# Patient Record
Sex: MALE | Race: WHITE | NOT HISPANIC OR LATINO | Employment: OTHER | URBAN - METROPOLITAN AREA
[De-identification: names, ages, dates, MRNs, and addresses within clinical notes are randomized per-mention and may not be internally consistent; named-entity substitution may affect disease eponyms.]

---

## 2019-05-01 ENCOUNTER — RECORDS - HEALTHEAST (OUTPATIENT)
Dept: LAB | Facility: CLINIC | Age: 84
End: 2019-05-01

## 2019-05-01 LAB — PREALB SERPL-MCNC: 15.6 MG/DL (ref 19–38)

## 2022-01-01 ENCOUNTER — APPOINTMENT (OUTPATIENT)
Dept: PHYSICAL THERAPY | Facility: HOSPITAL | Age: 87
DRG: 521 | End: 2022-01-01
Payer: COMMERCIAL

## 2022-01-01 ENCOUNTER — APPOINTMENT (OUTPATIENT)
Dept: OCCUPATIONAL THERAPY | Facility: HOSPITAL | Age: 87
DRG: 521 | End: 2022-01-01
Payer: COMMERCIAL

## 2022-01-01 ENCOUNTER — ANESTHESIA (OUTPATIENT)
Dept: SURGERY | Facility: HOSPITAL | Age: 87
DRG: 521 | End: 2022-01-01
Payer: COMMERCIAL

## 2022-01-01 ENCOUNTER — APPOINTMENT (OUTPATIENT)
Dept: RADIOLOGY | Facility: HOSPITAL | Age: 87
DRG: 521 | End: 2022-01-01
Attending: INTERNAL MEDICINE
Payer: COMMERCIAL

## 2022-01-01 ENCOUNTER — APPOINTMENT (OUTPATIENT)
Dept: RADIOLOGY | Facility: HOSPITAL | Age: 87
DRG: 521 | End: 2022-01-01
Attending: STUDENT IN AN ORGANIZED HEALTH CARE EDUCATION/TRAINING PROGRAM
Payer: COMMERCIAL

## 2022-01-01 ENCOUNTER — APPOINTMENT (OUTPATIENT)
Dept: OCCUPATIONAL THERAPY | Facility: HOSPITAL | Age: 87
DRG: 521 | End: 2022-01-01
Attending: STUDENT IN AN ORGANIZED HEALTH CARE EDUCATION/TRAINING PROGRAM
Payer: COMMERCIAL

## 2022-01-01 ENCOUNTER — APPOINTMENT (OUTPATIENT)
Dept: PHYSICAL THERAPY | Facility: HOSPITAL | Age: 87
DRG: 521 | End: 2022-01-01
Attending: STUDENT IN AN ORGANIZED HEALTH CARE EDUCATION/TRAINING PROGRAM
Payer: COMMERCIAL

## 2022-01-01 ENCOUNTER — APPOINTMENT (OUTPATIENT)
Dept: SPEECH THERAPY | Facility: HOSPITAL | Age: 87
DRG: 521 | End: 2022-01-01
Payer: COMMERCIAL

## 2022-01-01 ENCOUNTER — HOSPITAL ENCOUNTER (INPATIENT)
Facility: HOSPICE | Age: 87
LOS: 2 days | End: 2022-07-28
Attending: INTERNAL MEDICINE | Admitting: INTERNAL MEDICINE

## 2022-01-01 ENCOUNTER — ANESTHESIA EVENT (OUTPATIENT)
Dept: SURGERY | Facility: HOSPITAL | Age: 87
DRG: 521 | End: 2022-01-01
Payer: COMMERCIAL

## 2022-01-01 ENCOUNTER — HOSPITAL ENCOUNTER (INPATIENT)
Facility: HOSPITAL | Age: 87
LOS: 11 days | Discharge: HOSPICE | DRG: 521 | End: 2022-07-26
Attending: EMERGENCY MEDICINE | Admitting: INTERNAL MEDICINE
Payer: COMMERCIAL

## 2022-01-01 ENCOUNTER — DOCUMENTATION ONLY (OUTPATIENT)
Dept: OTHER | Facility: CLINIC | Age: 87
End: 2022-01-01

## 2022-01-01 ENCOUNTER — APPOINTMENT (OUTPATIENT)
Dept: CT IMAGING | Facility: HOSPITAL | Age: 87
DRG: 521 | End: 2022-01-01
Attending: INTERNAL MEDICINE
Payer: COMMERCIAL

## 2022-01-01 ENCOUNTER — APPOINTMENT (OUTPATIENT)
Dept: MRI IMAGING | Facility: HOSPITAL | Age: 87
DRG: 521 | End: 2022-01-01
Attending: INTERNAL MEDICINE
Payer: COMMERCIAL

## 2022-01-01 ENCOUNTER — APPOINTMENT (OUTPATIENT)
Dept: SPEECH THERAPY | Facility: HOSPITAL | Age: 87
DRG: 521 | End: 2022-01-01
Attending: INTERNAL MEDICINE
Payer: COMMERCIAL

## 2022-01-01 VITALS
OXYGEN SATURATION: 95 % | DIASTOLIC BLOOD PRESSURE: 70 MMHG | RESPIRATION RATE: 22 BRPM | SYSTOLIC BLOOD PRESSURE: 98 MMHG | TEMPERATURE: 98 F

## 2022-01-01 VITALS
DIASTOLIC BLOOD PRESSURE: 68 MMHG | HEART RATE: 74 BPM | WEIGHT: 137 LBS | SYSTOLIC BLOOD PRESSURE: 137 MMHG | BODY MASS INDEX: 20.76 KG/M2 | TEMPERATURE: 97.6 F | OXYGEN SATURATION: 94 % | HEIGHT: 68 IN | RESPIRATION RATE: 18 BRPM

## 2022-01-01 DIAGNOSIS — S72.002A HIP FRACTURE, LEFT, CLOSED, INITIAL ENCOUNTER (H): ICD-10-CM

## 2022-01-01 DIAGNOSIS — G20.A1 PARKINSON DISEASE (H): Primary | ICD-10-CM

## 2022-01-01 LAB
ABO/RH(D): NORMAL
ALBUMIN SERPL-MCNC: 3.8 G/DL (ref 3.5–5)
ALP SERPL-CCNC: 96 U/L (ref 45–120)
ALT SERPL W P-5'-P-CCNC: <9 U/L (ref 0–45)
ANION GAP SERPL CALCULATED.3IONS-SCNC: 11 MMOL/L (ref 5–18)
ANION GAP SERPL CALCULATED.3IONS-SCNC: 5 MMOL/L (ref 5–18)
ANION GAP SERPL CALCULATED.3IONS-SCNC: 6 MMOL/L (ref 5–18)
ANION GAP SERPL CALCULATED.3IONS-SCNC: 6 MMOL/L (ref 5–18)
ANION GAP SERPL CALCULATED.3IONS-SCNC: 7 MMOL/L (ref 5–18)
ANION GAP SERPL CALCULATED.3IONS-SCNC: 7 MMOL/L (ref 5–18)
ANION GAP SERPL CALCULATED.3IONS-SCNC: 8 MMOL/L (ref 5–18)
ANTIBODY SCREEN: NEGATIVE
AST SERPL W P-5'-P-CCNC: 28 U/L (ref 0–40)
ATRIAL RATE - MUSE: 59 BPM
BASOPHILS # BLD AUTO: 0 10E3/UL (ref 0–0.2)
BASOPHILS # BLD AUTO: 0 10E3/UL (ref 0–0.2)
BASOPHILS NFR BLD AUTO: 0 %
BASOPHILS NFR BLD AUTO: 0 %
BILIRUB SERPL-MCNC: 1.5 MG/DL (ref 0–1)
BUN SERPL-MCNC: 15 MG/DL (ref 8–28)
BUN SERPL-MCNC: 15 MG/DL (ref 8–28)
BUN SERPL-MCNC: 17 MG/DL (ref 8–28)
BUN SERPL-MCNC: 17 MG/DL (ref 8–28)
BUN SERPL-MCNC: 23 MG/DL (ref 8–28)
BUN SERPL-MCNC: 26 MG/DL (ref 8–28)
BUN SERPL-MCNC: 33 MG/DL (ref 8–28)
CALCIUM SERPL-MCNC: 8.3 MG/DL (ref 8.5–10.5)
CALCIUM SERPL-MCNC: 8.3 MG/DL (ref 8.5–10.5)
CALCIUM SERPL-MCNC: 8.4 MG/DL (ref 8.5–10.5)
CALCIUM SERPL-MCNC: 8.5 MG/DL (ref 8.5–10.5)
CALCIUM SERPL-MCNC: 8.6 MG/DL (ref 8.5–10.5)
CALCIUM SERPL-MCNC: 8.6 MG/DL (ref 8.5–10.5)
CALCIUM SERPL-MCNC: 9.4 MG/DL (ref 8.5–10.5)
CHLORIDE BLD-SCNC: 105 MMOL/L (ref 98–107)
CHLORIDE BLD-SCNC: 107 MMOL/L (ref 98–107)
CHLORIDE BLD-SCNC: 107 MMOL/L (ref 98–107)
CHLORIDE BLD-SCNC: 108 MMOL/L (ref 98–107)
CHLORIDE BLD-SCNC: 109 MMOL/L (ref 98–107)
CO2 SERPL-SCNC: 25 MMOL/L (ref 22–31)
CO2 SERPL-SCNC: 27 MMOL/L (ref 22–31)
CO2 SERPL-SCNC: 27 MMOL/L (ref 22–31)
CO2 SERPL-SCNC: 28 MMOL/L (ref 22–31)
CO2 SERPL-SCNC: 28 MMOL/L (ref 22–31)
CO2 SERPL-SCNC: 29 MMOL/L (ref 22–31)
CO2 SERPL-SCNC: 30 MMOL/L (ref 22–31)
CREAT SERPL-MCNC: 0.71 MG/DL (ref 0.7–1.3)
CREAT SERPL-MCNC: 0.74 MG/DL (ref 0.7–1.3)
CREAT SERPL-MCNC: 0.76 MG/DL (ref 0.7–1.3)
CREAT SERPL-MCNC: 0.77 MG/DL (ref 0.7–1.3)
CREAT SERPL-MCNC: 0.82 MG/DL (ref 0.7–1.3)
CREAT SERPL-MCNC: 0.98 MG/DL (ref 0.7–1.3)
CREAT SERPL-MCNC: 1.26 MG/DL (ref 0.7–1.3)
DIASTOLIC BLOOD PRESSURE - MUSE: 78 MMHG
EOSINOPHIL # BLD AUTO: 0 10E3/UL (ref 0–0.7)
EOSINOPHIL # BLD AUTO: 0.1 10E3/UL (ref 0–0.7)
EOSINOPHIL NFR BLD AUTO: 0 %
EOSINOPHIL NFR BLD AUTO: 1 %
ERYTHROCYTE [DISTWIDTH] IN BLOOD BY AUTOMATED COUNT: 12.8 % (ref 10–15)
ERYTHROCYTE [DISTWIDTH] IN BLOOD BY AUTOMATED COUNT: 12.8 % (ref 10–15)
ERYTHROCYTE [DISTWIDTH] IN BLOOD BY AUTOMATED COUNT: 13.2 % (ref 10–15)
GFR SERPL CREATININE-BSD FRML MDRD: 55 ML/MIN/1.73M2
GFR SERPL CREATININE-BSD FRML MDRD: 74 ML/MIN/1.73M2
GFR SERPL CREATININE-BSD FRML MDRD: 84 ML/MIN/1.73M2
GFR SERPL CREATININE-BSD FRML MDRD: 86 ML/MIN/1.73M2
GFR SERPL CREATININE-BSD FRML MDRD: 86 ML/MIN/1.73M2
GFR SERPL CREATININE-BSD FRML MDRD: 87 ML/MIN/1.73M2
GFR SERPL CREATININE-BSD FRML MDRD: 88 ML/MIN/1.73M2
GLUCOSE BLD-MCNC: 102 MG/DL (ref 70–125)
GLUCOSE BLD-MCNC: 103 MG/DL (ref 70–125)
GLUCOSE BLD-MCNC: 104 MG/DL (ref 70–125)
GLUCOSE BLD-MCNC: 107 MG/DL (ref 70–125)
GLUCOSE BLD-MCNC: 110 MG/DL (ref 70–125)
GLUCOSE BLD-MCNC: 97 MG/DL (ref 70–125)
GLUCOSE BLD-MCNC: 99 MG/DL (ref 70–125)
GLUCOSE BLDC GLUCOMTR-MCNC: 115 MG/DL (ref 70–99)
GLUCOSE BLDC GLUCOMTR-MCNC: 164 MG/DL (ref 70–99)
HCT VFR BLD AUTO: 34.8 % (ref 40–53)
HCT VFR BLD AUTO: 40.1 % (ref 40–53)
HCT VFR BLD AUTO: 43.8 % (ref 40–53)
HGB BLD-MCNC: 10.5 G/DL (ref 13.3–17.7)
HGB BLD-MCNC: 11.1 G/DL (ref 13.3–17.7)
HGB BLD-MCNC: 11.5 G/DL (ref 13.3–17.7)
HGB BLD-MCNC: 11.5 G/DL (ref 13.3–17.7)
HGB BLD-MCNC: 11.7 G/DL (ref 13.3–17.7)
HGB BLD-MCNC: 12 G/DL (ref 13.3–17.7)
HGB BLD-MCNC: 13.3 G/DL (ref 13.3–17.7)
HGB BLD-MCNC: 14.5 G/DL (ref 13.3–17.7)
HOLD SPECIMEN: NORMAL
IMM GRANULOCYTES # BLD: 0 10E3/UL
IMM GRANULOCYTES # BLD: 0.1 10E3/UL
IMM GRANULOCYTES NFR BLD: 1 %
IMM GRANULOCYTES NFR BLD: 1 %
INTERPRETATION ECG - MUSE: NORMAL
LYMPHOCYTES # BLD AUTO: 0.8 10E3/UL (ref 0.8–5.3)
LYMPHOCYTES # BLD AUTO: 0.9 10E3/UL (ref 0.8–5.3)
LYMPHOCYTES NFR BLD AUTO: 6 %
LYMPHOCYTES NFR BLD AUTO: 9 %
MAGNESIUM SERPL-MCNC: 1.9 MG/DL (ref 1.8–2.6)
MAGNESIUM SERPL-MCNC: 1.9 MG/DL (ref 1.8–2.6)
MAGNESIUM SERPL-MCNC: 2 MG/DL (ref 1.8–2.6)
MAGNESIUM SERPL-MCNC: 2 MG/DL (ref 1.8–2.6)
MAGNESIUM SERPL-MCNC: 2.2 MG/DL (ref 1.8–2.6)
MCH RBC QN AUTO: 30.7 PG (ref 26.5–33)
MCH RBC QN AUTO: 30.9 PG (ref 26.5–33)
MCH RBC QN AUTO: 31.2 PG (ref 26.5–33)
MCHC RBC AUTO-ENTMCNC: 33 G/DL (ref 31.5–36.5)
MCHC RBC AUTO-ENTMCNC: 33.1 G/DL (ref 31.5–36.5)
MCHC RBC AUTO-ENTMCNC: 33.2 G/DL (ref 31.5–36.5)
MCV RBC AUTO: 93 FL (ref 78–100)
MCV RBC AUTO: 93 FL (ref 78–100)
MCV RBC AUTO: 94 FL (ref 78–100)
MONOCYTES # BLD AUTO: 0.6 10E3/UL (ref 0–1.3)
MONOCYTES # BLD AUTO: 0.8 10E3/UL (ref 0–1.3)
MONOCYTES NFR BLD AUTO: 6 %
MONOCYTES NFR BLD AUTO: 7 %
NEUTROPHILS # BLD AUTO: 13.1 10E3/UL (ref 1.6–8.3)
NEUTROPHILS # BLD AUTO: 6.9 10E3/UL (ref 1.6–8.3)
NEUTROPHILS NFR BLD AUTO: 82 %
NEUTROPHILS NFR BLD AUTO: 87 %
NRBC # BLD AUTO: 0 10E3/UL
NRBC # BLD AUTO: 0 10E3/UL
NRBC BLD AUTO-RTO: 0 /100
NRBC BLD AUTO-RTO: 0 /100
P AXIS - MUSE: 56 DEGREES
PLATELET # BLD AUTO: 154 10E3/UL (ref 150–450)
PLATELET # BLD AUTO: 171 10E3/UL (ref 150–450)
PLATELET # BLD AUTO: 193 10E3/UL (ref 150–450)
POTASSIUM BLD-SCNC: 3.3 MMOL/L (ref 3.5–5)
POTASSIUM BLD-SCNC: 3.5 MMOL/L (ref 3.5–5)
POTASSIUM BLD-SCNC: 3.6 MMOL/L (ref 3.5–5)
POTASSIUM BLD-SCNC: 3.7 MMOL/L (ref 3.5–5)
POTASSIUM BLD-SCNC: 3.8 MMOL/L (ref 3.5–5)
POTASSIUM BLD-SCNC: 3.9 MMOL/L (ref 3.5–5)
POTASSIUM BLD-SCNC: 4 MMOL/L (ref 3.5–5)
POTASSIUM BLD-SCNC: 4.1 MMOL/L (ref 3.5–5)
POTASSIUM BLD-SCNC: 4.2 MMOL/L (ref 3.5–5)
PR INTERVAL - MUSE: 164 MS
PROT SERPL-MCNC: 7.1 G/DL (ref 6–8)
QRS DURATION - MUSE: 150 MS
QT - MUSE: 476 MS
QTC - MUSE: 471 MS
R AXIS - MUSE: -39 DEGREES
RBC # BLD AUTO: 3.74 10E6/UL (ref 4.4–5.9)
RBC # BLD AUTO: 4.3 10E6/UL (ref 4.4–5.9)
RBC # BLD AUTO: 4.65 10E6/UL (ref 4.4–5.9)
SARS-COV-2 RNA RESP QL NAA+PROBE: NEGATIVE
SARS-COV-2 RNA RESP QL NAA+PROBE: NEGATIVE
SODIUM SERPL-SCNC: 139 MMOL/L (ref 136–145)
SODIUM SERPL-SCNC: 141 MMOL/L (ref 136–145)
SODIUM SERPL-SCNC: 142 MMOL/L (ref 136–145)
SODIUM SERPL-SCNC: 143 MMOL/L (ref 136–145)
SODIUM SERPL-SCNC: 143 MMOL/L (ref 136–145)
SPECIMEN EXPIRATION DATE: NORMAL
SYSTOLIC BLOOD PRESSURE - MUSE: 149 MMHG
T AXIS - MUSE: 35 DEGREES
VENTRICULAR RATE- MUSE: 59 BPM
WBC # BLD AUTO: 14.8 10E3/UL (ref 4–11)
WBC # BLD AUTO: 7.7 10E3/UL (ref 4–11)
WBC # BLD AUTO: 8.4 10E3/UL (ref 4–11)

## 2022-01-01 PROCEDURE — 250N000013 HC RX MED GY IP 250 OP 250 PS 637: Performed by: STUDENT IN AN ORGANIZED HEALTH CARE EDUCATION/TRAINING PROGRAM

## 2022-01-01 PROCEDURE — 250N000013 HC RX MED GY IP 250 OP 250 PS 637: Performed by: PHYSICIAN ASSISTANT

## 2022-01-01 PROCEDURE — 258N000003 HC RX IP 258 OP 636: Performed by: ANESTHESIOLOGY

## 2022-01-01 PROCEDURE — 250N000013 HC RX MED GY IP 250 OP 250 PS 637: Performed by: FAMILY MEDICINE

## 2022-01-01 PROCEDURE — C1713 ANCHOR/SCREW BN/BN,TIS/BN: HCPCS | Performed by: STUDENT IN AN ORGANIZED HEALTH CARE EDUCATION/TRAINING PROGRAM

## 2022-01-01 PROCEDURE — 70551 MRI BRAIN STEM W/O DYE: CPT

## 2022-01-01 PROCEDURE — 258N000003 HC RX IP 258 OP 636: Performed by: INTERNAL MEDICINE

## 2022-01-01 PROCEDURE — 250N000011 HC RX IP 250 OP 636: Performed by: HOSPITALIST

## 2022-01-01 PROCEDURE — 120N000001 HC R&B MED SURG/OB

## 2022-01-01 PROCEDURE — 97110 THERAPEUTIC EXERCISES: CPT | Mod: GP

## 2022-01-01 PROCEDURE — 250N000009 HC RX 250: Performed by: ANESTHESIOLOGY

## 2022-01-01 PROCEDURE — 99223 1ST HOSP IP/OBS HIGH 75: CPT | Performed by: INTERNAL MEDICINE

## 2022-01-01 PROCEDURE — 97116 GAIT TRAINING THERAPY: CPT | Mod: GP

## 2022-01-01 PROCEDURE — 80048 BASIC METABOLIC PNL TOTAL CA: CPT | Performed by: INTERNAL MEDICINE

## 2022-01-01 PROCEDURE — 36415 COLL VENOUS BLD VENIPUNCTURE: CPT | Performed by: INTERNAL MEDICINE

## 2022-01-01 PROCEDURE — 74230 X-RAY XM SWLNG FUNCJ C+: CPT

## 2022-01-01 PROCEDURE — 83735 ASSAY OF MAGNESIUM: CPT | Performed by: INTERNAL MEDICINE

## 2022-01-01 PROCEDURE — 99233 SBSQ HOSP IP/OBS HIGH 50: CPT | Performed by: INTERNAL MEDICINE

## 2022-01-01 PROCEDURE — 97530 THERAPEUTIC ACTIVITIES: CPT | Mod: GP

## 2022-01-01 PROCEDURE — 99255 IP/OBS CONSLTJ NEW/EST HI 80: CPT | Performed by: FAMILY MEDICINE

## 2022-01-01 PROCEDURE — 85018 HEMOGLOBIN: CPT | Performed by: INTERNAL MEDICINE

## 2022-01-01 PROCEDURE — 99232 SBSQ HOSP IP/OBS MODERATE 35: CPT | Performed by: FAMILY MEDICINE

## 2022-01-01 PROCEDURE — 999N000141 HC STATISTIC PRE-PROCEDURE NURSING ASSESSMENT: Performed by: STUDENT IN AN ORGANIZED HEALTH CARE EDUCATION/TRAINING PROGRAM

## 2022-01-01 PROCEDURE — 250N000013 HC RX MED GY IP 250 OP 250 PS 637: Performed by: INTERNAL MEDICINE

## 2022-01-01 PROCEDURE — 99239 HOSP IP/OBS DSCHRG MGMT >30: CPT | Performed by: FAMILY MEDICINE

## 2022-01-01 PROCEDURE — 36415 COLL VENOUS BLD VENIPUNCTURE: CPT | Performed by: STUDENT IN AN ORGANIZED HEALTH CARE EDUCATION/TRAINING PROGRAM

## 2022-01-01 PROCEDURE — 97535 SELF CARE MNGMENT TRAINING: CPT | Mod: GO

## 2022-01-01 PROCEDURE — 99231 SBSQ HOSP IP/OBS SF/LOW 25: CPT | Performed by: FAMILY MEDICINE

## 2022-01-01 PROCEDURE — 370N000017 HC ANESTHESIA TECHNICAL FEE, PER MIN: Performed by: STUDENT IN AN ORGANIZED HEALTH CARE EDUCATION/TRAINING PROGRAM

## 2022-01-01 PROCEDURE — 0SRS0J9 REPLACEMENT OF LEFT HIP JOINT, FEMORAL SURFACE WITH SYNTHETIC SUBSTITUTE, CEMENTED, OPEN APPROACH: ICD-10-PCS | Performed by: STUDENT IN AN ORGANIZED HEALTH CARE EDUCATION/TRAINING PROGRAM

## 2022-01-01 PROCEDURE — T2046 HOSPICE LONG TERM CARE, R&B: HCPCS

## 2022-01-01 PROCEDURE — 85025 COMPLETE CBC W/AUTO DIFF WBC: CPT | Performed by: INTERNAL MEDICINE

## 2022-01-01 PROCEDURE — 82374 ASSAY BLOOD CARBON DIOXIDE: CPT | Performed by: INTERNAL MEDICINE

## 2022-01-01 PROCEDURE — 250N000013 HC RX MED GY IP 250 OP 250 PS 637: Performed by: NURSE PRACTITIONER

## 2022-01-01 PROCEDURE — 97165 OT EVAL LOW COMPLEX 30 MIN: CPT | Mod: GO

## 2022-01-01 PROCEDURE — 87635 SARS-COV-2 COVID-19 AMP PRB: CPT | Performed by: FAMILY MEDICINE

## 2022-01-01 PROCEDURE — 99233 SBSQ HOSP IP/OBS HIGH 50: CPT | Performed by: FAMILY MEDICINE

## 2022-01-01 PROCEDURE — 85018 HEMOGLOBIN: CPT | Performed by: STUDENT IN AN ORGANIZED HEALTH CARE EDUCATION/TRAINING PROGRAM

## 2022-01-01 PROCEDURE — 93005 ELECTROCARDIOGRAM TRACING: CPT | Mod: 76

## 2022-01-01 PROCEDURE — 710N000009 HC RECOVERY PHASE 1, LEVEL 1, PER MIN: Performed by: STUDENT IN AN ORGANIZED HEALTH CARE EDUCATION/TRAINING PROGRAM

## 2022-01-01 PROCEDURE — C1776 JOINT DEVICE (IMPLANTABLE): HCPCS | Performed by: STUDENT IN AN ORGANIZED HEALTH CARE EDUCATION/TRAINING PROGRAM

## 2022-01-01 PROCEDURE — 92611 MOTION FLUOROSCOPY/SWALLOW: CPT | Mod: GN

## 2022-01-01 PROCEDURE — 250N000011 HC RX IP 250 OP 636: Performed by: STUDENT IN AN ORGANIZED HEALTH CARE EDUCATION/TRAINING PROGRAM

## 2022-01-01 PROCEDURE — 360N000077 HC SURGERY LEVEL 4, PER MIN: Performed by: STUDENT IN AN ORGANIZED HEALTH CARE EDUCATION/TRAINING PROGRAM

## 2022-01-01 PROCEDURE — 258N000001 HC RX 258: Performed by: STUDENT IN AN ORGANIZED HEALTH CARE EDUCATION/TRAINING PROGRAM

## 2022-01-01 PROCEDURE — 72170 X-RAY EXAM OF PELVIS: CPT

## 2022-01-01 PROCEDURE — 99232 SBSQ HOSP IP/OBS MODERATE 35: CPT | Performed by: INTERNAL MEDICINE

## 2022-01-01 PROCEDURE — 272N000001 HC OR GENERAL SUPPLY STERILE: Performed by: STUDENT IN AN ORGANIZED HEALTH CARE EDUCATION/TRAINING PROGRAM

## 2022-01-01 PROCEDURE — 92526 ORAL FUNCTION THERAPY: CPT | Mod: GN | Performed by: SPEECH-LANGUAGE PATHOLOGIST

## 2022-01-01 PROCEDURE — 250N000011 HC RX IP 250 OP 636: Performed by: INTERNAL MEDICINE

## 2022-01-01 PROCEDURE — 87635 SARS-COV-2 COVID-19 AMP PRB: CPT | Performed by: EMERGENCY MEDICINE

## 2022-01-01 PROCEDURE — 250N000011 HC RX IP 250 OP 636: Performed by: NURSE ANESTHETIST, CERTIFIED REGISTERED

## 2022-01-01 PROCEDURE — 85027 COMPLETE CBC AUTOMATED: CPT | Performed by: INTERNAL MEDICINE

## 2022-01-01 PROCEDURE — 70450 CT HEAD/BRAIN W/O DYE: CPT

## 2022-01-01 PROCEDURE — C9803 HOPD COVID-19 SPEC COLLECT: HCPCS

## 2022-01-01 PROCEDURE — 99285 EMERGENCY DEPT VISIT HI MDM: CPT | Mod: 25

## 2022-01-01 PROCEDURE — 999N000065 XR PELVIS AND HIP PORTABLE LEFT 1 VIEW

## 2022-01-01 PROCEDURE — 86850 RBC ANTIBODY SCREEN: CPT | Performed by: HOSPITALIST

## 2022-01-01 PROCEDURE — 99222 1ST HOSP IP/OBS MODERATE 55: CPT | Performed by: NURSE PRACTITIONER

## 2022-01-01 PROCEDURE — 85004 AUTOMATED DIFF WBC COUNT: CPT | Performed by: EMERGENCY MEDICINE

## 2022-01-01 PROCEDURE — 250N000009 HC RX 250: Performed by: STUDENT IN AN ORGANIZED HEALTH CARE EDUCATION/TRAINING PROGRAM

## 2022-01-01 PROCEDURE — 250N000009 HC RX 250: Performed by: NURSE ANESTHETIST, CERTIFIED REGISTERED

## 2022-01-01 PROCEDURE — 250N000013 HC RX MED GY IP 250 OP 250 PS 637

## 2022-01-01 PROCEDURE — 93005 ELECTROCARDIOGRAM TRACING: CPT | Performed by: INTERNAL MEDICINE

## 2022-01-01 PROCEDURE — 999N000127 HC STATISTIC PERIPHERAL IV START W US GUIDANCE

## 2022-01-01 PROCEDURE — 97162 PT EVAL MOD COMPLEX 30 MIN: CPT | Mod: GP

## 2022-01-01 PROCEDURE — 80053 COMPREHEN METABOLIC PANEL: CPT | Performed by: EMERGENCY MEDICINE

## 2022-01-01 PROCEDURE — 84132 ASSAY OF SERUM POTASSIUM: CPT | Performed by: INTERNAL MEDICINE

## 2022-01-01 PROCEDURE — 92610 EVALUATE SWALLOWING FUNCTION: CPT | Mod: GN | Performed by: SPEECH-LANGUAGE PATHOLOGIST

## 2022-01-01 PROCEDURE — 36415 COLL VENOUS BLD VENIPUNCTURE: CPT | Performed by: EMERGENCY MEDICINE

## 2022-01-01 PROCEDURE — 96374 THER/PROPH/DIAG INJ IV PUSH: CPT

## 2022-01-01 PROCEDURE — 258N000003 HC RX IP 258 OP 636: Performed by: NURSE ANESTHETIST, CERTIFIED REGISTERED

## 2022-01-01 PROCEDURE — 73552 X-RAY EXAM OF FEMUR 2/>: CPT | Mod: LT

## 2022-01-01 DEVICE — PLUG CEMENT BUCK W/INSERT 18.5 71279422: Type: IMPLANTABLE DEVICE | Site: FEMUR | Status: FUNCTIONAL

## 2022-01-01 DEVICE — TOBRA FULL DOSE ANTIBIOTIC BONE CEMENT, 10 PACK CATALOG NUMBER IS 6197-9-010
Type: IMPLANTABLE DEVICE | Site: FEMUR | Status: FUNCTIONAL
Brand: SIMPLEX

## 2022-01-01 DEVICE — MODULAR CATHCART TAPERED SPACER 12/14 TAPER -3MM: Type: IMPLANTABLE DEVICE | Site: FEMUR | Status: FUNCTIONAL

## 2022-01-01 DEVICE — MODULAR CATHCART FRACTURE HEAD HIP BALL 49MM OD +5MM: Type: IMPLANTABLE DEVICE | Site: FEMUR | Status: FUNCTIONAL

## 2022-01-01 DEVICE — IMPLANTABLE DEVICE: Type: IMPLANTABLE DEVICE | Site: FEMUR | Status: FUNCTIONAL

## 2022-01-01 DEVICE — SUMMIT FEMORAL STEM 12/14 TAPER BASIC CEMENTED SIZE 5 121MM
Type: IMPLANTABLE DEVICE | Site: FEMUR | Status: FUNCTIONAL
Brand: SUMMIT

## 2022-01-01 RX ORDER — HEPARIN SODIUM 5000 [USP'U]/.5ML
5000 INJECTION, SOLUTION INTRAVENOUS; SUBCUTANEOUS EVERY 8 HOURS
Status: DISCONTINUED | OUTPATIENT
Start: 2022-01-01 | End: 2022-01-01

## 2022-01-01 RX ORDER — CARBIDOPA AND LEVODOPA 25; 100 MG/1; MG/1
1 TABLET ORAL
Status: DISCONTINUED | OUTPATIENT
Start: 2022-01-01 | End: 2022-01-01 | Stop reason: HOSPADM

## 2022-01-01 RX ORDER — PROCHLORPERAZINE MALEATE 5 MG
5 TABLET ORAL EVERY 6 HOURS PRN
Status: DISCONTINUED | OUTPATIENT
Start: 2022-01-01 | End: 2022-01-01

## 2022-01-01 RX ORDER — NALOXONE HYDROCHLORIDE 0.4 MG/ML
0.2 INJECTION, SOLUTION INTRAMUSCULAR; INTRAVENOUS; SUBCUTANEOUS
Status: DISCONTINUED | OUTPATIENT
Start: 2022-01-01 | End: 2022-01-01

## 2022-01-01 RX ORDER — MINERAL OIL/HYDROPHIL PETROLAT
OINTMENT (GRAM) TOPICAL
Status: DISCONTINUED | OUTPATIENT
Start: 2022-01-01 | End: 2022-01-01 | Stop reason: HOSPADM

## 2022-01-01 RX ORDER — LORAZEPAM 2 MG/ML
0.5 CONCENTRATE ORAL EVERY 4 HOURS PRN
Status: DISCONTINUED | OUTPATIENT
Start: 2022-01-01 | End: 2022-01-01 | Stop reason: HOSPADM

## 2022-01-01 RX ORDER — LORAZEPAM 2 MG/ML
1-2 CONCENTRATE ORAL EVERY 4 HOURS PRN
Status: DISCONTINUED | OUTPATIENT
Start: 2022-01-01 | End: 2022-01-01

## 2022-01-01 RX ORDER — LIDOCAINE 50 MG/G
OINTMENT TOPICAL EVERY 4 HOURS PRN
Qty: 50 G | Refills: 0 | Status: SHIPPED | OUTPATIENT
Start: 2022-01-01

## 2022-01-01 RX ORDER — MORPHINE SULFATE 20 MG/ML
5-10 SOLUTION ORAL
Qty: 30 ML | Refills: 0 | Status: SHIPPED | OUTPATIENT
Start: 2022-01-01 | End: 2022-01-01

## 2022-01-01 RX ORDER — NALOXONE HYDROCHLORIDE 0.4 MG/ML
0.4 INJECTION, SOLUTION INTRAMUSCULAR; INTRAVENOUS; SUBCUTANEOUS
Status: DISCONTINUED | OUTPATIENT
Start: 2022-01-01 | End: 2022-01-01

## 2022-01-01 RX ORDER — LANOLIN ALCOHOL/MO/W.PET/CERES
3 CREAM (GRAM) TOPICAL AT BEDTIME
Status: DISCONTINUED | OUTPATIENT
Start: 2022-01-01 | End: 2022-01-01 | Stop reason: HOSPADM

## 2022-01-01 RX ORDER — SODIUM CHLORIDE, SODIUM LACTATE, POTASSIUM CHLORIDE, CALCIUM CHLORIDE 600; 310; 30; 20 MG/100ML; MG/100ML; MG/100ML; MG/100ML
INJECTION, SOLUTION INTRAVENOUS CONTINUOUS
Status: DISCONTINUED | OUTPATIENT
Start: 2022-01-01 | End: 2022-01-01

## 2022-01-01 RX ORDER — CARBIDOPA AND LEVODOPA 25; 100 MG/1; MG/1
1.5 TABLET ORAL 3 TIMES DAILY
Qty: 30 TABLET | Refills: 0 | Status: SHIPPED | OUTPATIENT
Start: 2022-01-01

## 2022-01-01 RX ORDER — ACETAMINOPHEN 325 MG/1
975 TABLET ORAL EVERY 8 HOURS
Status: DISCONTINUED | OUTPATIENT
Start: 2022-01-01 | End: 2022-01-01

## 2022-01-01 RX ORDER — CARBIDOPA AND LEVODOPA 25; 100 MG/1; MG/1
1 TABLET ORAL 3 TIMES DAILY
Status: DISCONTINUED | OUTPATIENT
Start: 2022-01-01 | End: 2022-01-01

## 2022-01-01 RX ORDER — ASPIRIN 325 MG
325 TABLET ORAL 2 TIMES DAILY
Qty: 60 TABLET | Refills: 0 | Status: SHIPPED | OUTPATIENT
Start: 2022-01-01 | End: 2022-01-01

## 2022-01-01 RX ORDER — ACETAMINOPHEN 325 MG/1
650 TABLET ORAL 3 TIMES DAILY
Status: DISCONTINUED | OUTPATIENT
Start: 2022-01-01 | End: 2022-01-01 | Stop reason: HOSPADM

## 2022-01-01 RX ORDER — ONDANSETRON 4 MG/1
4 TABLET, ORALLY DISINTEGRATING ORAL EVERY 6 HOURS PRN
Status: DISCONTINUED | OUTPATIENT
Start: 2022-01-01 | End: 2022-01-01 | Stop reason: HOSPADM

## 2022-01-01 RX ORDER — CARBIDOPA AND LEVODOPA 25; 100 MG/1; MG/1
1.5 TABLET ORAL 3 TIMES DAILY
Status: ON HOLD | COMMUNITY
End: 2022-01-01

## 2022-01-01 RX ORDER — MORPHINE SULFATE 2 MG/ML
1-2 INJECTION, SOLUTION INTRAMUSCULAR; INTRAVENOUS
Status: DISCONTINUED | OUTPATIENT
Start: 2022-01-01 | End: 2022-01-01 | Stop reason: HOSPADM

## 2022-01-01 RX ORDER — PROPOFOL 10 MG/ML
INJECTION, EMULSION INTRAVENOUS CONTINUOUS PRN
Status: DISCONTINUED | OUTPATIENT
Start: 2022-01-01 | End: 2022-01-01

## 2022-01-01 RX ORDER — FAMOTIDINE 20 MG/1
20 TABLET, FILM COATED ORAL 2 TIMES DAILY
Status: ON HOLD | COMMUNITY
End: 2022-01-01

## 2022-01-01 RX ORDER — MAGNESIUM HYDROXIDE 1200 MG/15ML
LIQUID ORAL PRN
Status: DISCONTINUED | OUTPATIENT
Start: 2022-01-01 | End: 2022-01-01 | Stop reason: HOSPADM

## 2022-01-01 RX ORDER — CEFAZOLIN SODIUM 1 G/3ML
1 INJECTION, POWDER, FOR SOLUTION INTRAMUSCULAR; INTRAVENOUS EVERY 8 HOURS
Status: COMPLETED | OUTPATIENT
Start: 2022-01-01 | End: 2022-01-01

## 2022-01-01 RX ORDER — CEFAZOLIN SODIUM/WATER 2 G/20 ML
2 SYRINGE (ML) INTRAVENOUS SEE ADMIN INSTRUCTIONS
Status: DISCONTINUED | OUTPATIENT
Start: 2022-01-01 | End: 2022-01-01

## 2022-01-01 RX ORDER — QUETIAPINE FUMARATE 25 MG/1
25 TABLET, FILM COATED ORAL AT BEDTIME
Status: DISCONTINUED | OUTPATIENT
Start: 2022-01-01 | End: 2022-01-01 | Stop reason: HOSPADM

## 2022-01-01 RX ORDER — QUETIAPINE FUMARATE 50 MG/1
50 TABLET, FILM COATED ORAL ONCE
Status: COMPLETED | OUTPATIENT
Start: 2022-01-01 | End: 2022-01-01

## 2022-01-01 RX ORDER — LATANOPROST 50 UG/ML
1 SOLUTION/ DROPS OPHTHALMIC AT BEDTIME
COMMUNITY

## 2022-01-01 RX ORDER — MORPHINE SULFATE 20 MG/ML
5-10 SOLUTION ORAL
Status: DISCONTINUED | OUTPATIENT
Start: 2022-01-01 | End: 2022-01-01 | Stop reason: HOSPADM

## 2022-01-01 RX ORDER — AMOXICILLIN 250 MG
1 CAPSULE ORAL 2 TIMES DAILY PRN
Qty: 60 TABLET | Refills: 0 | Status: SHIPPED | OUTPATIENT
Start: 2022-01-01

## 2022-01-01 RX ORDER — LIDOCAINE 40 MG/G
CREAM TOPICAL
Status: DISCONTINUED | OUTPATIENT
Start: 2022-01-01 | End: 2022-01-01

## 2022-01-01 RX ORDER — MORPHINE SULFATE 20 MG/ML
5 SOLUTION ORAL
Status: DISCONTINUED | OUTPATIENT
Start: 2022-01-01 | End: 2022-01-01 | Stop reason: HOSPADM

## 2022-01-01 RX ORDER — POTASSIUM CHLORIDE 7.45 MG/ML
10 INJECTION INTRAVENOUS
Status: COMPLETED | OUTPATIENT
Start: 2022-01-01 | End: 2022-01-01

## 2022-01-01 RX ORDER — BARIUM SULFATE 400 MG/ML
SUSPENSION ORAL ONCE
Status: COMPLETED | OUTPATIENT
Start: 2022-01-01 | End: 2022-01-01

## 2022-01-01 RX ORDER — CEFAZOLIN SODIUM/WATER 2 G/20 ML
2 SYRINGE (ML) INTRAVENOUS
Status: COMPLETED | OUTPATIENT
Start: 2022-01-01 | End: 2022-01-01

## 2022-01-01 RX ORDER — LIDOCAINE 50 MG/G
OINTMENT TOPICAL EVERY 4 HOURS PRN
Status: DISCONTINUED | OUTPATIENT
Start: 2022-01-01 | End: 2022-01-01 | Stop reason: HOSPADM

## 2022-01-01 RX ORDER — ONDANSETRON 2 MG/ML
4 INJECTION INTRAMUSCULAR; INTRAVENOUS EVERY 6 HOURS PRN
Status: DISCONTINUED | OUTPATIENT
Start: 2022-01-01 | End: 2022-01-01 | Stop reason: HOSPADM

## 2022-01-01 RX ORDER — QUETIAPINE FUMARATE 25 MG/1
12.5 TABLET, FILM COATED ORAL EVERY 6 HOURS PRN
Qty: 20 TABLET | Refills: 0 | Status: SHIPPED | OUTPATIENT
Start: 2022-01-01

## 2022-01-01 RX ORDER — FENTANYL CITRATE 50 UG/ML
50 INJECTION, SOLUTION INTRAMUSCULAR; INTRAVENOUS EVERY 5 MIN PRN
Status: DISCONTINUED | OUTPATIENT
Start: 2022-01-01 | End: 2022-01-01 | Stop reason: HOSPADM

## 2022-01-01 RX ORDER — SODIUM CHLORIDE, SODIUM LACTATE, POTASSIUM CHLORIDE, CALCIUM CHLORIDE 600; 310; 30; 20 MG/100ML; MG/100ML; MG/100ML; MG/100ML
INJECTION, SOLUTION INTRAVENOUS CONTINUOUS
Status: DISCONTINUED | OUTPATIENT
Start: 2022-01-01 | End: 2022-01-01 | Stop reason: HOSPADM

## 2022-01-01 RX ORDER — ACETAMINOPHEN 325 MG/1
650 TABLET ORAL EVERY 4 HOURS PRN
Qty: 100 TABLET | Refills: 0 | Status: SHIPPED | OUTPATIENT
Start: 2022-01-01 | End: 2022-01-01

## 2022-01-01 RX ORDER — FAMOTIDINE 20 MG/1
20 TABLET, FILM COATED ORAL 2 TIMES DAILY
Status: DISCONTINUED | OUTPATIENT
Start: 2022-01-01 | End: 2022-01-01 | Stop reason: HOSPADM

## 2022-01-01 RX ORDER — QUETIAPINE FUMARATE 25 MG/1
12.5 TABLET, FILM COATED ORAL AT BEDTIME
Qty: 30 TABLET | Refills: 0 | Status: CANCELLED | OUTPATIENT
Start: 2022-01-01

## 2022-01-01 RX ORDER — AMOXICILLIN 250 MG
1 CAPSULE ORAL 2 TIMES DAILY
Status: DISCONTINUED | OUTPATIENT
Start: 2022-01-01 | End: 2022-01-01

## 2022-01-01 RX ORDER — OLANZAPINE 10 MG/2ML
5 INJECTION, POWDER, FOR SOLUTION INTRAMUSCULAR EVERY 6 HOURS PRN
Status: DISCONTINUED | OUTPATIENT
Start: 2022-01-01 | End: 2022-01-01

## 2022-01-01 RX ORDER — ACETAMINOPHEN 325 MG/1
325 TABLET ORAL EVERY 4 HOURS PRN
Status: DISCONTINUED | OUTPATIENT
Start: 2022-01-01 | End: 2022-01-01

## 2022-01-01 RX ORDER — QUETIAPINE FUMARATE 25 MG/1
12.5 TABLET, FILM COATED ORAL AT BEDTIME
Qty: 15 TABLET | Refills: 0 | Status: SHIPPED | OUTPATIENT
Start: 2022-01-01

## 2022-01-01 RX ORDER — SALIVA STIMULANT COMB. NO.3
2 SPRAY, NON-AEROSOL (ML) MUCOUS MEMBRANE
Status: DISCONTINUED | OUTPATIENT
Start: 2022-01-01 | End: 2022-01-01 | Stop reason: HOSPADM

## 2022-01-01 RX ORDER — KETAMINE HYDROCHLORIDE 10 MG/ML
INJECTION INTRAMUSCULAR; INTRAVENOUS PRN
Status: DISCONTINUED | OUTPATIENT
Start: 2022-01-01 | End: 2022-01-01

## 2022-01-01 RX ORDER — CARBOXYMETHYLCELLULOSE SODIUM 5 MG/ML
1-2 SOLUTION/ DROPS OPHTHALMIC
Status: DISCONTINUED | OUTPATIENT
Start: 2022-01-01 | End: 2022-01-01 | Stop reason: HOSPADM

## 2022-01-01 RX ORDER — BISACODYL 10 MG
10 SUPPOSITORY, RECTAL RECTAL DAILY PRN
Status: DISCONTINUED | OUTPATIENT
Start: 2022-01-01 | End: 2022-01-01

## 2022-01-01 RX ORDER — AMOXICILLIN 250 MG
1 CAPSULE ORAL 2 TIMES DAILY PRN
Status: DISCONTINUED | OUTPATIENT
Start: 2022-01-01 | End: 2022-01-01 | Stop reason: HOSPADM

## 2022-01-01 RX ORDER — AMOXICILLIN 250 MG
1 CAPSULE ORAL 2 TIMES DAILY
Status: DISCONTINUED | OUTPATIENT
Start: 2022-01-01 | End: 2022-01-01 | Stop reason: HOSPADM

## 2022-01-01 RX ORDER — MORPHINE SULFATE 10 MG/5ML
5-10 SOLUTION ORAL
Status: DISCONTINUED | OUTPATIENT
Start: 2022-01-01 | End: 2022-01-01

## 2022-01-01 RX ORDER — LIDOCAINE 40 MG/G
CREAM TOPICAL
Status: DISCONTINUED | OUTPATIENT
Start: 2022-01-01 | End: 2022-01-01 | Stop reason: HOSPADM

## 2022-01-01 RX ORDER — LORAZEPAM 2 MG/ML
0.5 CONCENTRATE ORAL EVERY 4 HOURS PRN
Qty: 30 ML | Refills: 0 | Status: SHIPPED | OUTPATIENT
Start: 2022-01-01

## 2022-01-01 RX ORDER — CARBIDOPA AND LEVODOPA 25; 100 MG/1; MG/1
1 TABLET ORAL 3 TIMES DAILY
Status: DISCONTINUED | OUTPATIENT
Start: 2022-01-01 | End: 2022-01-01 | Stop reason: HOSPADM

## 2022-01-01 RX ORDER — LORAZEPAM 2 MG/ML
1 CONCENTRATE ORAL EVERY 4 HOURS PRN
Status: DISCONTINUED | OUTPATIENT
Start: 2022-01-01 | End: 2022-01-01

## 2022-01-01 RX ORDER — LORAZEPAM 2 MG/ML
0.5 CONCENTRATE ORAL EVERY 4 HOURS PRN
Status: DISCONTINUED | OUTPATIENT
Start: 2022-01-01 | End: 2022-01-01

## 2022-01-01 RX ORDER — BUPIVACAINE HYDROCHLORIDE 5 MG/ML
INJECTION, SOLUTION EPIDURAL; INTRACAUDAL
Status: COMPLETED | OUTPATIENT
Start: 2022-01-01 | End: 2022-01-01

## 2022-01-01 RX ORDER — QUETIAPINE FUMARATE 25 MG/1
25 TABLET, FILM COATED ORAL AT BEDTIME
Status: DISCONTINUED | OUTPATIENT
Start: 2022-01-01 | End: 2022-01-01

## 2022-01-01 RX ORDER — ASPIRIN 325 MG
325 TABLET ORAL 2 TIMES DAILY
Status: DISCONTINUED | OUTPATIENT
Start: 2022-01-01 | End: 2022-01-01

## 2022-01-01 RX ORDER — ACETAMINOPHEN 325 MG/1
650 TABLET ORAL EVERY 4 HOURS PRN
Status: DISCONTINUED | OUTPATIENT
Start: 2022-01-01 | End: 2022-01-01 | Stop reason: HOSPADM

## 2022-01-01 RX ORDER — LATANOPROST 50 UG/ML
1 SOLUTION/ DROPS OPHTHALMIC AT BEDTIME
Status: DISCONTINUED | OUTPATIENT
Start: 2022-01-01 | End: 2022-01-01 | Stop reason: HOSPADM

## 2022-01-01 RX ORDER — ONDANSETRON 2 MG/ML
4 INJECTION INTRAMUSCULAR; INTRAVENOUS EVERY 30 MIN PRN
Status: DISCONTINUED | OUTPATIENT
Start: 2022-01-01 | End: 2022-01-01 | Stop reason: HOSPADM

## 2022-01-01 RX ORDER — MORPHINE SULFATE 20 MG/ML
5 SOLUTION ORAL
Qty: 30 ML | Refills: 0 | Status: SHIPPED | OUTPATIENT
Start: 2022-01-01

## 2022-01-01 RX ORDER — POLYETHYLENE GLYCOL 3350 17 G/17G
17 POWDER, FOR SOLUTION ORAL DAILY
Status: DISCONTINUED | OUTPATIENT
Start: 2022-01-01 | End: 2022-01-01

## 2022-01-01 RX ORDER — CARBIDOPA AND LEVODOPA 25; 100 MG/1; MG/1
1 TABLET, EXTENDED RELEASE ORAL 2 TIMES DAILY
Status: DISCONTINUED | OUTPATIENT
Start: 2022-01-01 | End: 2022-01-01 | Stop reason: CLARIF

## 2022-01-01 RX ORDER — OXYCODONE HYDROCHLORIDE 5 MG/1
5 TABLET ORAL EVERY 4 HOURS PRN
Status: DISCONTINUED | OUTPATIENT
Start: 2022-01-01 | End: 2022-01-01 | Stop reason: HOSPADM

## 2022-01-01 RX ORDER — FAMOTIDINE 20 MG/1
20 TABLET, FILM COATED ORAL 2 TIMES DAILY
Qty: 40 TABLET | Refills: 0 | Status: SHIPPED | OUTPATIENT
Start: 2022-01-01 | End: 2022-08-14

## 2022-01-01 RX ORDER — BISACODYL 10 MG
10 SUPPOSITORY, RECTAL RECTAL
Status: DISCONTINUED | OUTPATIENT
Start: 2022-01-01 | End: 2022-01-01 | Stop reason: HOSPADM

## 2022-01-01 RX ORDER — BENZTROPINE MESYLATE 0.5 MG/1
1 TABLET ORAL 3 TIMES DAILY PRN
Status: DISCONTINUED | OUTPATIENT
Start: 2022-01-01 | End: 2022-01-01

## 2022-01-01 RX ORDER — NALOXONE HYDROCHLORIDE 0.4 MG/ML
0.1 INJECTION, SOLUTION INTRAMUSCULAR; INTRAVENOUS; SUBCUTANEOUS
Status: DISCONTINUED | OUTPATIENT
Start: 2022-01-01 | End: 2022-01-01

## 2022-01-01 RX ORDER — FENTANYL CITRATE 50 UG/ML
INJECTION, SOLUTION INTRAMUSCULAR; INTRAVENOUS PRN
Status: DISCONTINUED | OUTPATIENT
Start: 2022-01-01 | End: 2022-01-01

## 2022-01-01 RX ORDER — ONDANSETRON 4 MG/1
4 TABLET, ORALLY DISINTEGRATING ORAL EVERY 30 MIN PRN
Status: DISCONTINUED | OUTPATIENT
Start: 2022-01-01 | End: 2022-01-01 | Stop reason: HOSPADM

## 2022-01-01 RX ORDER — LORAZEPAM 2 MG/ML
1-2 CONCENTRATE ORAL EVERY 4 HOURS PRN
Status: DISCONTINUED | OUTPATIENT
Start: 2022-01-01 | End: 2022-01-01 | Stop reason: HOSPADM

## 2022-01-01 RX ORDER — QUETIAPINE FUMARATE 25 MG/1
25 TABLET, FILM COATED ORAL EVERY 6 HOURS PRN
Status: DISCONTINUED | OUTPATIENT
Start: 2022-01-01 | End: 2022-01-01 | Stop reason: HOSPADM

## 2022-01-01 RX ORDER — ACETAMINOPHEN 325 MG/1
650 TABLET ORAL EVERY 8 HOURS
Status: COMPLETED | OUTPATIENT
Start: 2022-01-01 | End: 2022-01-01

## 2022-01-01 RX ORDER — ACETAMINOPHEN 325 MG/1
650 TABLET ORAL EVERY 4 HOURS PRN
Qty: 100 TABLET | Refills: 0 | Status: SHIPPED | OUTPATIENT
Start: 2022-01-01

## 2022-01-01 RX ORDER — TRANEXAMIC ACID 650 MG/1
1950 TABLET ORAL ONCE
Status: COMPLETED | OUTPATIENT
Start: 2022-01-01 | End: 2022-01-01

## 2022-01-01 RX ORDER — FAMOTIDINE 20 MG/1
20 TABLET, FILM COATED ORAL EVERY 24 HOURS
Status: DISCONTINUED | OUTPATIENT
Start: 2022-01-01 | End: 2022-01-01

## 2022-01-01 RX ORDER — OLANZAPINE 5 MG/1
5 TABLET, ORALLY DISINTEGRATING ORAL EVERY 6 HOURS PRN
Status: DISCONTINUED | OUTPATIENT
Start: 2022-01-01 | End: 2022-01-01

## 2022-01-01 RX ADMIN — MORPHINE SULFATE 5 MG: 20 SOLUTION ORAL at 04:45

## 2022-01-01 RX ADMIN — CARBIDOPA AND LEVODOPA 1 TABLET: 25; 100 TABLET ORAL at 17:56

## 2022-01-01 RX ADMIN — CARBIDOPA AND LEVODOPA 1 HALF-TAB: 25; 100 TABLET ORAL at 18:04

## 2022-01-01 RX ADMIN — POTASSIUM CHLORIDE 10 MEQ: 7.46 INJECTION, SOLUTION INTRAVENOUS at 11:46

## 2022-01-01 RX ADMIN — ACETAMINOPHEN 650 MG: 325 TABLET ORAL at 09:00

## 2022-01-01 RX ADMIN — FAMOTIDINE 20 MG: 20 TABLET ORAL at 09:03

## 2022-01-01 RX ADMIN — CARBIDOPA AND LEVODOPA 1 TABLET: 25; 100 TABLET ORAL at 08:57

## 2022-01-01 RX ADMIN — ASPIRIN 325 MG: 325 TABLET, FILM COATED ORAL at 08:57

## 2022-01-01 RX ADMIN — CARBIDOPA AND LEVODOPA 1 HALF-TAB: 25; 100 TABLET ORAL at 16:43

## 2022-01-01 RX ADMIN — SENNOSIDES AND DOCUSATE SODIUM 1 TABLET: 50; 8.6 TABLET ORAL at 21:35

## 2022-01-01 RX ADMIN — HYDROMORPHONE HYDROCHLORIDE 0.2 MG: 1 INJECTION, SOLUTION INTRAMUSCULAR; INTRAVENOUS; SUBCUTANEOUS at 04:30

## 2022-01-01 RX ADMIN — MORPHINE SULFATE 5 MG: 20 SOLUTION ORAL at 22:30

## 2022-01-01 RX ADMIN — CARBIDOPA AND LEVODOPA 1 HALF-TAB: 25; 100 TABLET ORAL at 06:32

## 2022-01-01 RX ADMIN — SENNOSIDES AND DOCUSATE SODIUM 1 TABLET: 50; 8.6 TABLET ORAL at 10:47

## 2022-01-01 RX ADMIN — CARBIDOPA AND LEVODOPA 1 TABLET: 25; 100 TABLET ORAL at 06:33

## 2022-01-01 RX ADMIN — CARBIDOPA AND LEVODOPA 1 HALF-TAB: 25; 100 TABLET ORAL at 14:22

## 2022-01-01 RX ADMIN — CARBIDOPA AND LEVODOPA 1 TABLET: 25; 100 TABLET ORAL at 18:04

## 2022-01-01 RX ADMIN — CARBIDOPA AND LEVODOPA 1 HALF-TAB: 25; 100 TABLET ORAL at 10:02

## 2022-01-01 RX ADMIN — Medication 1 TABLET: at 21:10

## 2022-01-01 RX ADMIN — CEFAZOLIN 1 G: 1 INJECTION, POWDER, FOR SOLUTION INTRAMUSCULAR; INTRAVENOUS at 18:49

## 2022-01-01 RX ADMIN — POLYETHYLENE GLYCOL 3350 17 G: 17 POWDER, FOR SOLUTION ORAL at 10:01

## 2022-01-01 RX ADMIN — CARBIDOPA AND LEVODOPA 1 HALF-TAB: 25; 100 TABLET ORAL at 13:51

## 2022-01-01 RX ADMIN — ACETAMINOPHEN 650 MG: 325 TABLET ORAL at 09:28

## 2022-01-01 RX ADMIN — Medication 3 MG: at 20:56

## 2022-01-01 RX ADMIN — POTASSIUM CHLORIDE 10 MEQ: 7.46 INJECTION, SOLUTION INTRAVENOUS at 13:55

## 2022-01-01 RX ADMIN — FAMOTIDINE 20 MG: 20 TABLET ORAL at 10:03

## 2022-01-01 RX ADMIN — CARBIDOPA AND LEVODOPA 1 HALF-TAB: 25; 100 TABLET ORAL at 15:58

## 2022-01-01 RX ADMIN — ACETAMINOPHEN 975 MG: 325 TABLET ORAL at 04:31

## 2022-01-01 RX ADMIN — CARBIDOPA AND LEVODOPA 1 HALF-TAB: 25; 100 TABLET ORAL at 00:13

## 2022-01-01 RX ADMIN — Medication 12.5 MG: at 00:18

## 2022-01-01 RX ADMIN — QUETIAPINE FUMARATE 12.5 MG: 25 TABLET ORAL at 21:35

## 2022-01-01 RX ADMIN — ASPIRIN 325 MG: 325 TABLET, FILM COATED ORAL at 11:53

## 2022-01-01 RX ADMIN — FAMOTIDINE 20 MG: 20 TABLET ORAL at 10:46

## 2022-01-01 RX ADMIN — CARBIDOPA AND LEVODOPA 1 HALF-TAB: 25; 100 TABLET ORAL at 14:14

## 2022-01-01 RX ADMIN — LORAZEPAM 1 MG: 2 CONCENTRATE ORAL at 22:33

## 2022-01-01 RX ADMIN — Medication 3 MG: at 20:04

## 2022-01-01 RX ADMIN — CARBIDOPA AND LEVODOPA 1 TABLET: 25; 100 TABLET ORAL at 20:56

## 2022-01-01 RX ADMIN — FAMOTIDINE 20 MG: 20 TABLET ORAL at 09:30

## 2022-01-01 RX ADMIN — ACETAMINOPHEN 650 MG: 325 TABLET ORAL at 06:30

## 2022-01-01 RX ADMIN — MORPHINE SULFATE 5 MG: 20 SOLUTION ORAL at 16:35

## 2022-01-01 RX ADMIN — QUETIAPINE 12.5 MG: 25 TABLET, FILM COATED ORAL at 13:07

## 2022-01-01 RX ADMIN — CARBIDOPA AND LEVODOPA 1 HALF-TAB: 25; 100 TABLET ORAL at 06:49

## 2022-01-01 RX ADMIN — CARBIDOPA AND LEVODOPA 1 HALF-TAB: 25; 100 TABLET ORAL at 20:02

## 2022-01-01 RX ADMIN — ASPIRIN 325 MG: 325 TABLET, FILM COATED ORAL at 20:02

## 2022-01-01 RX ADMIN — CARBIDOPA AND LEVODOPA 1 HALF-TAB: 25; 100 TABLET ORAL at 17:56

## 2022-01-01 RX ADMIN — FAMOTIDINE 20 MG: 20 TABLET ORAL at 20:04

## 2022-01-01 RX ADMIN — LATANOPROST 1 DROP: 50 SOLUTION/ DROPS OPHTHALMIC at 21:01

## 2022-01-01 RX ADMIN — DEXTROSE AND SODIUM CHLORIDE: 5; 900 INJECTION, SOLUTION INTRAVENOUS at 16:12

## 2022-01-01 RX ADMIN — CARBIDOPA AND LEVODOPA 1 HALF-TAB: 25; 100 TABLET ORAL at 06:30

## 2022-01-01 RX ADMIN — ASPIRIN 325 MG: 325 TABLET, FILM COATED ORAL at 07:35

## 2022-01-01 RX ADMIN — ACETAMINOPHEN 650 MG: 325 TABLET ORAL at 07:22

## 2022-01-01 RX ADMIN — ACETAMINOPHEN 650 MG: 325 TABLET ORAL at 13:17

## 2022-01-01 RX ADMIN — ACETAMINOPHEN 650 MG: 325 TABLET ORAL at 13:07

## 2022-01-01 RX ADMIN — LORAZEPAM 1 MG: 2 LIQUID ORAL at 21:36

## 2022-01-01 RX ADMIN — CARBIDOPA AND LEVODOPA 1 TABLET: 25; 100 TABLET ORAL at 20:02

## 2022-01-01 RX ADMIN — LATANOPROST 1 DROP: 50 SOLUTION/ DROPS OPHTHALMIC at 21:19

## 2022-01-01 RX ADMIN — FAMOTIDINE 20 MG: 20 TABLET ORAL at 21:35

## 2022-01-01 RX ADMIN — CARBIDOPA AND LEVODOPA 1 TABLET: 25; 100 TABLET ORAL at 10:46

## 2022-01-01 RX ADMIN — Medication 12.5 MG: at 11:52

## 2022-01-01 RX ADMIN — HEPARIN SODIUM 5000 UNITS: 10000 INJECTION, SOLUTION INTRAVENOUS; SUBCUTANEOUS at 00:09

## 2022-01-01 RX ADMIN — LATANOPROST 1 DROP: 50 SOLUTION/ DROPS OPHTHALMIC at 21:24

## 2022-01-01 RX ADMIN — ACETAMINOPHEN 650 MG: 325 TABLET ORAL at 21:24

## 2022-01-01 RX ADMIN — CARBIDOPA AND LEVODOPA 1 TABLET: 25; 100 TABLET ORAL at 13:51

## 2022-01-01 RX ADMIN — CARBIDOPA AND LEVODOPA 1 TABLET: 25; 100 TABLET ORAL at 16:18

## 2022-01-01 RX ADMIN — ACETAMINOPHEN 325 MG: 325 TABLET ORAL at 22:19

## 2022-01-01 RX ADMIN — LORAZEPAM 0.5 MG: 2 CONCENTRATE ORAL at 21:09

## 2022-01-01 RX ADMIN — PHENYLEPHRINE HYDROCHLORIDE 0.2 MCG/KG/MIN: 10 INJECTION INTRAVENOUS at 11:09

## 2022-01-01 RX ADMIN — CARBIDOPA AND LEVODOPA 1 HALF-TAB: 25; 100 TABLET ORAL at 20:01

## 2022-01-01 RX ADMIN — HYDROMORPHONE HYDROCHLORIDE 0.2 MG: 1 INJECTION, SOLUTION INTRAMUSCULAR; INTRAVENOUS; SUBCUTANEOUS at 17:48

## 2022-01-01 RX ADMIN — OLANZAPINE 5 MG: 5 TABLET, ORALLY DISINTEGRATING ORAL at 21:02

## 2022-01-01 RX ADMIN — ACETAMINOPHEN 650 MG: 325 TABLET ORAL at 21:40

## 2022-01-01 RX ADMIN — ASPIRIN 325 MG: 325 TABLET, FILM COATED ORAL at 09:32

## 2022-01-01 RX ADMIN — FAMOTIDINE 20 MG: 20 TABLET ORAL at 08:57

## 2022-01-01 RX ADMIN — ACETAMINOPHEN 650 MG: 325 TABLET ORAL at 14:00

## 2022-01-01 RX ADMIN — Medication 2 SPRAY: at 13:54

## 2022-01-01 RX ADMIN — CARBIDOPA AND LEVODOPA 1 HALF-TAB: 25; 100 TABLET ORAL at 11:52

## 2022-01-01 RX ADMIN — CARBIDOPA AND LEVODOPA 1 TABLET: 25; 100 TABLET ORAL at 13:17

## 2022-01-01 RX ADMIN — LORAZEPAM 0.5 MG: 2 CONCENTRATE ORAL at 16:25

## 2022-01-01 RX ADMIN — CARBIDOPA AND LEVODOPA 1 TABLET: 25; 100 TABLET ORAL at 15:59

## 2022-01-01 RX ADMIN — MORPHINE SULFATE 5 MG: 20 SOLUTION ORAL at 00:47

## 2022-01-01 RX ADMIN — Medication 12.5 MG: at 21:25

## 2022-01-01 RX ADMIN — ASPIRIN 325 MG: 325 TABLET, FILM COATED ORAL at 20:04

## 2022-01-01 RX ADMIN — ASPIRIN 325 MG: 325 TABLET, FILM COATED ORAL at 20:57

## 2022-01-01 RX ADMIN — BARIUM SULFATE: 400 SUSPENSION ORAL at 11:20

## 2022-01-01 RX ADMIN — ACETAMINOPHEN 650 MG: 325 TABLET ORAL at 06:26

## 2022-01-01 RX ADMIN — CARBIDOPA AND LEVODOPA 1 TABLET: 25; 100 TABLET ORAL at 07:23

## 2022-01-01 RX ADMIN — CARBIDOPA AND LEVODOPA 1 TABLET: 25; 100 TABLET ORAL at 10:02

## 2022-01-01 RX ADMIN — ASPIRIN 325 MG: 325 TABLET, FILM COATED ORAL at 11:47

## 2022-01-01 RX ADMIN — ACETAMINOPHEN 650 MG: 325 TABLET ORAL at 12:53

## 2022-01-01 RX ADMIN — QUETIAPINE FUMARATE 25 MG: 25 TABLET, FILM COATED ORAL at 08:07

## 2022-01-01 RX ADMIN — CARBIDOPA AND LEVODOPA 1 TABLET: 25; 100 TABLET ORAL at 14:14

## 2022-01-01 RX ADMIN — ACETAMINOPHEN 650 MG: 325 TABLET ORAL at 20:39

## 2022-01-01 RX ADMIN — Medication 2 G: at 10:58

## 2022-01-01 RX ADMIN — FAMOTIDINE 20 MG: 20 TABLET ORAL at 21:36

## 2022-01-01 RX ADMIN — SENNOSIDES AND DOCUSATE SODIUM 1 TABLET: 50; 8.6 TABLET ORAL at 21:37

## 2022-01-01 RX ADMIN — CEFAZOLIN 1 G: 1 INJECTION, POWDER, FOR SOLUTION INTRAMUSCULAR; INTRAVENOUS at 04:31

## 2022-01-01 RX ADMIN — ACETAMINOPHEN 975 MG: 325 TABLET ORAL at 20:20

## 2022-01-01 RX ADMIN — CARBIDOPA AND LEVODOPA 1 TABLET: 25; 100 TABLET ORAL at 06:30

## 2022-01-01 RX ADMIN — ACETAMINOPHEN 650 MG: 325 TABLET ORAL at 20:55

## 2022-01-01 RX ADMIN — BUPIVACAINE HYDROCHLORIDE 3 ML: 5 INJECTION, SOLUTION EPIDURAL; INTRACAUDAL; PERINEURAL at 10:57

## 2022-01-01 RX ADMIN — LATANOPROST 1 DROP: 50 SOLUTION/ DROPS OPHTHALMIC at 00:28

## 2022-01-01 RX ADMIN — SENNOSIDES AND DOCUSATE SODIUM 1 TABLET: 50; 8.6 TABLET ORAL at 09:30

## 2022-01-01 RX ADMIN — CARBIDOPA AND LEVODOPA 1 TABLET: 25; 100 TABLET ORAL at 16:47

## 2022-01-01 RX ADMIN — CARBIDOPA AND LEVODOPA 1 TABLET: 25; 100 TABLET ORAL at 20:00

## 2022-01-01 RX ADMIN — FAMOTIDINE 20 MG: 20 TABLET ORAL at 21:24

## 2022-01-01 RX ADMIN — FAMOTIDINE 20 MG: 20 TABLET ORAL at 07:34

## 2022-01-01 RX ADMIN — POLYETHYLENE GLYCOL 3350 17 G: 17 POWDER, FOR SOLUTION ORAL at 12:17

## 2022-01-01 RX ADMIN — Medication 1 MG: at 22:19

## 2022-01-01 RX ADMIN — ACETAMINOPHEN 650 MG: 325 TABLET ORAL at 03:14

## 2022-01-01 RX ADMIN — MORPHINE SULFATE 5 MG: 20 SOLUTION ORAL at 23:45

## 2022-01-01 RX ADMIN — ACETAMINOPHEN 975 MG: 325 TABLET ORAL at 14:14

## 2022-01-01 RX ADMIN — SENNOSIDES AND DOCUSATE SODIUM 1 TABLET: 50; 8.6 TABLET ORAL at 12:22

## 2022-01-01 RX ADMIN — POLYETHYLENE GLYCOL 3350 17 G: 17 POWDER, FOR SOLUTION ORAL at 08:57

## 2022-01-01 RX ADMIN — HEPARIN SODIUM 5000 UNITS: 10000 INJECTION, SOLUTION INTRAVENOUS; SUBCUTANEOUS at 00:13

## 2022-01-01 RX ADMIN — Medication 12.5 MG: at 20:05

## 2022-01-01 RX ADMIN — CARBIDOPA AND LEVODOPA 1 TABLET: 25; 100 TABLET ORAL at 11:48

## 2022-01-01 RX ADMIN — ACETAMINOPHEN 650 MG: 325 TABLET ORAL at 21:35

## 2022-01-01 RX ADMIN — DEXTROSE AND SODIUM CHLORIDE: 5; 900 INJECTION, SOLUTION INTRAVENOUS at 13:58

## 2022-01-01 RX ADMIN — QUETIAPINE FUMARATE 50 MG: 50 TABLET, FILM COATED ORAL at 00:19

## 2022-01-01 RX ADMIN — CARBIDOPA AND LEVODOPA 1 HALF-TAB: 25; 100 TABLET ORAL at 13:17

## 2022-01-01 RX ADMIN — ACETAMINOPHEN 975 MG: 325 TABLET ORAL at 21:19

## 2022-01-01 RX ADMIN — ACETAMINOPHEN 650 MG: 325 TABLET ORAL at 10:45

## 2022-01-01 RX ADMIN — CARBIDOPA AND LEVODOPA 1 TABLET: 25; 100 TABLET ORAL at 00:13

## 2022-01-01 RX ADMIN — Medication 12.5 MG: at 07:21

## 2022-01-01 RX ADMIN — MORPHINE SULFATE 5 MG: 20 SOLUTION ORAL at 17:57

## 2022-01-01 RX ADMIN — CARBIDOPA AND LEVODOPA 1 TABLET: 25; 100 TABLET ORAL at 12:18

## 2022-01-01 RX ADMIN — CARBIDOPA AND LEVODOPA 1 TABLET: 25; 100 TABLET ORAL at 13:01

## 2022-01-01 RX ADMIN — LORAZEPAM 0.5 MG: 2 LIQUID ORAL at 22:12

## 2022-01-01 RX ADMIN — Medication 3 MG: at 21:24

## 2022-01-01 RX ADMIN — SODIUM CHLORIDE, POTASSIUM CHLORIDE, SODIUM LACTATE AND CALCIUM CHLORIDE: 600; 310; 30; 20 INJECTION, SOLUTION INTRAVENOUS at 16:34

## 2022-01-01 RX ADMIN — ASPIRIN 325 MG: 325 TABLET, FILM COATED ORAL at 20:09

## 2022-01-01 RX ADMIN — POTASSIUM CHLORIDE 10 MEQ: 7.46 INJECTION, SOLUTION INTRAVENOUS at 12:44

## 2022-01-01 RX ADMIN — CARBIDOPA AND LEVODOPA 1 TABLET: 25; 100 TABLET ORAL at 16:43

## 2022-01-01 RX ADMIN — LORAZEPAM 1 MG: 2 CONCENTRATE ORAL at 16:35

## 2022-01-01 RX ADMIN — POLYETHYLENE GLYCOL 3350 17 G: 17 POWDER, FOR SOLUTION ORAL at 10:47

## 2022-01-01 RX ADMIN — ACETAMINOPHEN 975 MG: 325 TABLET ORAL at 05:23

## 2022-01-01 RX ADMIN — DEXTROSE AND SODIUM CHLORIDE: 5; 900 INJECTION, SOLUTION INTRAVENOUS at 09:17

## 2022-01-01 RX ADMIN — FAMOTIDINE 20 MG: 20 TABLET ORAL at 20:09

## 2022-01-01 RX ADMIN — KETAMINE HYDROCHLORIDE 20 MG: 10 INJECTION, SOLUTION INTRAMUSCULAR; INTRAVENOUS at 10:57

## 2022-01-01 RX ADMIN — HYDROMORPHONE HYDROCHLORIDE 0.2 MG: 1 INJECTION, SOLUTION INTRAMUSCULAR; INTRAVENOUS; SUBCUTANEOUS at 00:18

## 2022-01-01 RX ADMIN — CARBIDOPA AND LEVODOPA 1 HALF-TAB: 25; 100 TABLET ORAL at 13:01

## 2022-01-01 RX ADMIN — LATANOPROST 1 DROP: 50 SOLUTION/ DROPS OPHTHALMIC at 20:32

## 2022-01-01 RX ADMIN — SENNOSIDES AND DOCUSATE SODIUM 1 TABLET: 50; 8.6 TABLET ORAL at 09:36

## 2022-01-01 RX ADMIN — KETAMINE HYDROCHLORIDE 10 MG: 10 INJECTION, SOLUTION INTRAMUSCULAR; INTRAVENOUS at 11:07

## 2022-01-01 RX ADMIN — ACETAMINOPHEN 325 MG: 325 TABLET ORAL at 00:17

## 2022-01-01 RX ADMIN — Medication 3 MG: at 20:39

## 2022-01-01 RX ADMIN — SODIUM CHLORIDE, POTASSIUM CHLORIDE, SODIUM LACTATE AND CALCIUM CHLORIDE: 600; 310; 30; 20 INJECTION, SOLUTION INTRAVENOUS at 09:24

## 2022-01-01 RX ADMIN — FAMOTIDINE 20 MG: 20 TABLET ORAL at 11:53

## 2022-01-01 RX ADMIN — MORPHINE SULFATE 5 MG: 20 SOLUTION ORAL at 08:04

## 2022-01-01 RX ADMIN — CARBIDOPA AND LEVODOPA 1 HALF-TAB: 25; 100 TABLET ORAL at 10:06

## 2022-01-01 RX ADMIN — ACETAMINOPHEN 650 MG: 325 TABLET ORAL at 21:36

## 2022-01-01 RX ADMIN — Medication 125 MCG: at 17:01

## 2022-01-01 RX ADMIN — Medication 3 MG: at 21:36

## 2022-01-01 RX ADMIN — MORPHINE SULFATE 5 MG: 20 SOLUTION ORAL at 19:33

## 2022-01-01 RX ADMIN — CARBIDOPA AND LEVODOPA 1 HALF-TAB: 25; 100 TABLET ORAL at 20:00

## 2022-01-01 RX ADMIN — ACETAMINOPHEN 650 MG: 325 TABLET ORAL at 10:06

## 2022-01-01 RX ADMIN — CARBIDOPA AND LEVODOPA 1 HALF-TAB: 25; 100 TABLET ORAL at 08:57

## 2022-01-01 RX ADMIN — LATANOPROST 1 DROP: 50 SOLUTION/ DROPS OPHTHALMIC at 21:36

## 2022-01-01 RX ADMIN — QUETIAPINE FUMARATE 12.5 MG: 25 TABLET ORAL at 21:25

## 2022-01-01 RX ADMIN — POLYETHYLENE GLYCOL 3350 17 G: 17 POWDER, FOR SOLUTION ORAL at 09:00

## 2022-01-01 RX ADMIN — CARBIDOPA AND LEVODOPA 1 HALF-TAB: 25; 100 TABLET ORAL at 16:47

## 2022-01-01 RX ADMIN — CARBIDOPA AND LEVODOPA 1 HALF-TAB: 25; 100 TABLET ORAL at 11:49

## 2022-01-01 RX ADMIN — LATANOPROST 1 DROP: 50 SOLUTION/ DROPS OPHTHALMIC at 21:52

## 2022-01-01 RX ADMIN — SENNOSIDES AND DOCUSATE SODIUM 1 TABLET: 50; 8.6 TABLET ORAL at 20:39

## 2022-01-01 RX ADMIN — Medication 12.5 MG: at 01:43

## 2022-01-01 RX ADMIN — CARBIDOPA AND LEVODOPA 1 TABLET: 25; 100 TABLET ORAL at 21:09

## 2022-01-01 RX ADMIN — LATANOPROST 1 DROP: 50 SOLUTION/ DROPS OPHTHALMIC at 22:04

## 2022-01-01 RX ADMIN — MORPHINE SULFATE 5 MG: 20 SOLUTION ORAL at 21:00

## 2022-01-01 RX ADMIN — SENNOSIDES AND DOCUSATE SODIUM 1 TABLET: 50; 8.6 TABLET ORAL at 12:02

## 2022-01-01 RX ADMIN — Medication 12.5 MG: at 09:14

## 2022-01-01 RX ADMIN — CARBIDOPA AND LEVODOPA 1 TABLET: 25; 100 TABLET ORAL at 06:50

## 2022-01-01 RX ADMIN — ACETAMINOPHEN 325 MG: 325 TABLET ORAL at 04:30

## 2022-01-01 RX ADMIN — FENTANYL CITRATE 25 MCG: 50 INJECTION, SOLUTION INTRAMUSCULAR; INTRAVENOUS at 10:57

## 2022-01-01 RX ADMIN — CARBIDOPA AND LEVODOPA 1 TABLET: 25; 100 TABLET ORAL at 13:21

## 2022-01-01 RX ADMIN — SENNOSIDES AND DOCUSATE SODIUM 1 TABLET: 50; 8.6 TABLET ORAL at 09:03

## 2022-01-01 RX ADMIN — POLYETHYLENE GLYCOL 3350 17 G: 17 POWDER, FOR SOLUTION ORAL at 07:35

## 2022-01-01 RX ADMIN — ACETAMINOPHEN 650 MG: 325 TABLET ORAL at 13:46

## 2022-01-01 RX ADMIN — OLANZAPINE 5 MG: 10 INJECTION, POWDER, LYOPHILIZED, FOR SOLUTION INTRAMUSCULAR at 01:19

## 2022-01-01 RX ADMIN — ACETAMINOPHEN 650 MG: 325 TABLET ORAL at 21:01

## 2022-01-01 RX ADMIN — FAMOTIDINE 20 MG: 20 TABLET ORAL at 20:39

## 2022-01-01 RX ADMIN — CARBIDOPA AND LEVODOPA 1 TABLET: 25; 100 TABLET ORAL at 10:07

## 2022-01-01 RX ADMIN — Medication 12.5 MG: at 13:43

## 2022-01-01 RX ADMIN — CARBIDOPA AND LEVODOPA 1 TABLET: 25; 100 TABLET ORAL at 20:01

## 2022-01-01 RX ADMIN — HEPARIN SODIUM 5000 UNITS: 10000 INJECTION, SOLUTION INTRAVENOUS; SUBCUTANEOUS at 16:36

## 2022-01-01 RX ADMIN — FAMOTIDINE 20 MG: 20 TABLET ORAL at 20:56

## 2022-01-01 RX ADMIN — FAMOTIDINE 20 MG: 20 TABLET, FILM COATED ORAL at 21:09

## 2022-01-01 RX ADMIN — Medication 1 MG: at 20:02

## 2022-01-01 RX ADMIN — SENNOSIDES AND DOCUSATE SODIUM 1 TABLET: 50; 8.6 TABLET ORAL at 10:07

## 2022-01-01 RX ADMIN — CARBIDOPA AND LEVODOPA 1 TABLET: 25; 100 TABLET ORAL at 12:02

## 2022-01-01 RX ADMIN — PROPOFOL 50 MCG/KG/MIN: 10 INJECTION, EMULSION INTRAVENOUS at 11:09

## 2022-01-01 RX ADMIN — Medication 12.5 MG: at 20:56

## 2022-01-01 RX ADMIN — Medication 3 MG: at 20:09

## 2022-01-01 RX ADMIN — Medication 125 MCG: at 22:29

## 2022-01-01 RX ADMIN — FAMOTIDINE 20 MG: 20 TABLET ORAL at 10:07

## 2022-01-01 RX ADMIN — ACETAMINOPHEN 650 MG: 325 TABLET ORAL at 13:30

## 2022-01-01 RX ADMIN — Medication 1 MG: at 20:00

## 2022-01-01 RX ADMIN — POTASSIUM CHLORIDE 10 MEQ: 7.46 INJECTION, SOLUTION INTRAVENOUS at 09:08

## 2022-01-01 RX ADMIN — Medication 1 MG: at 21:19

## 2022-01-01 RX ADMIN — CARBIDOPA AND LEVODOPA 1 HALF-TAB: 25; 100 TABLET ORAL at 07:29

## 2022-01-01 RX ADMIN — CARBIDOPA AND LEVODOPA 1 HALF-TAB: 25; 100 TABLET ORAL at 10:46

## 2022-01-01 RX ADMIN — SENNOSIDES AND DOCUSATE SODIUM 1 TABLET: 50; 8.6 TABLET ORAL at 20:09

## 2022-01-01 RX ADMIN — TRANEXAMIC ACID 1950 MG: 650 TABLET ORAL at 09:40

## 2022-01-01 RX ADMIN — LATANOPROST 1 DROP: 50 SOLUTION/ DROPS OPHTHALMIC at 20:40

## 2022-01-01 RX ADMIN — CARBIDOPA AND LEVODOPA 1 HALF-TAB: 25; 100 TABLET ORAL at 21:00

## 2022-01-01 RX ADMIN — Medication 12.5 MG: at 18:16

## 2022-01-01 RX ADMIN — QUETIAPINE FUMARATE 25 MG: 25 TABLET ORAL at 21:35

## 2022-01-01 RX ADMIN — FAMOTIDINE 20 MG: 20 TABLET ORAL at 20:02

## 2022-01-01 RX ADMIN — ASPIRIN 325 MG: 325 TABLET, FILM COATED ORAL at 20:00

## 2022-01-01 RX ADMIN — FENTANYL CITRATE 25 MCG: 50 INJECTION, SOLUTION INTRAMUSCULAR; INTRAVENOUS at 11:07

## 2022-01-01 RX ADMIN — CARBIDOPA AND LEVODOPA 1 HALF-TAB: 25; 100 TABLET ORAL at 12:18

## 2022-01-01 RX ADMIN — CARBIDOPA AND LEVODOPA 1 TABLET: 25; 100 TABLET ORAL at 14:22

## 2022-01-01 RX ADMIN — CARBIDOPA AND LEVODOPA 1 HALF-TAB: 25; 100 TABLET ORAL at 16:18

## 2022-01-01 RX ADMIN — ASPIRIN 325 MG: 325 TABLET, FILM COATED ORAL at 10:03

## 2022-01-01 RX ADMIN — Medication 12.5 MG: at 18:14

## 2022-01-01 ASSESSMENT — ACTIVITIES OF DAILY LIVING (ADL)
ADLS_ACUITY_SCORE: 50
ADLS_ACUITY_SCORE: 63
ADLS_ACUITY_SCORE: 46
ADLS_ACUITY_SCORE: 50
DIFFICULTY_COMMUNICATING: NO
ADLS_ACUITY_SCORE: 52
ADLS_ACUITY_SCORE: 63
ADLS_ACUITY_SCORE: 46
ADLS_ACUITY_SCORE: 50
TRANSFERRING: 2-->COMPLETELY DEPENDENT
WALKING_OR_CLIMBING_STAIRS_DIFFICULTY: YES
ADLS_ACUITY_SCORE: 50
ADLS_ACUITY_SCORE: 52
ADLS_ACUITY_SCORE: 43
ADLS_ACUITY_SCORE: 50
ADLS_ACUITY_SCORE: 40
ADLS_ACUITY_SCORE: 43
ADLS_ACUITY_SCORE: 63
WEAR_GLASSES_OR_BLIND: NO
ADLS_ACUITY_SCORE: 50
ADLS_ACUITY_SCORE: 50
TOILETING_ISSUES: YES
ADLS_ACUITY_SCORE: 52
ADLS_ACUITY_SCORE: 43
TOILETING_ASSISTANCE: TOILETING DIFFICULTY, ASSISTANCE 1 PERSON
ADLS_ACUITY_SCORE: 50
ADLS_ACUITY_SCORE: 52
ADLS_ACUITY_SCORE: 50
ADLS_ACUITY_SCORE: 52
ADLS_ACUITY_SCORE: 50
ADLS_ACUITY_SCORE: 63
ADLS_ACUITY_SCORE: 50
ADLS_ACUITY_SCORE: 50
ADLS_ACUITY_SCORE: 43
ADLS_ACUITY_SCORE: 50
TRANSFERRING: 2-->COMPLETELY DEPENDENT (NOT DEVELOPMENTALLY APPROPRIATE)
ADLS_ACUITY_SCORE: 63
TOILETING: 2-->COMPLETELY DEPENDENT (NOT DEVELOPMENTALLY APPROPRIATE)
ADLS_ACUITY_SCORE: 52
ADLS_ACUITY_SCORE: 46
ADLS_ACUITY_SCORE: 43
HEARING_DIFFICULTY_OR_DEAF: NO
ADLS_ACUITY_SCORE: 40
ADLS_ACUITY_SCORE: 52
EATING/SWALLOWING: SWALLOWING LIQUIDS;SWALLOWING SOLID FOOD;EATING
ADLS_ACUITY_SCORE: 54
CONCENTRATING,_REMEMBERING_OR_MAKING_DECISIONS_DIFFICULTY: YES
ADLS_ACUITY_SCORE: 53
ADLS_ACUITY_SCORE: 52
ADLS_ACUITY_SCORE: 37
ADLS_ACUITY_SCORE: 52
ADLS_ACUITY_SCORE: 37
ADLS_ACUITY_SCORE: 50
CHANGE_IN_FUNCTIONAL_STATUS_SINCE_ONSET_OF_CURRENT_ILLNESS/INJURY: YES
ADLS_ACUITY_SCORE: 52
ADLS_ACUITY_SCORE: 50
CONCENTRATING,_REMEMBERING_OR_MAKING_DECISIONS_DIFFICULTY: YES
ADLS_ACUITY_SCORE: 63
DOING_ERRANDS_INDEPENDENTLY_DIFFICULTY: NO
ADLS_ACUITY_SCORE: 53
ADLS_ACUITY_SCORE: 50
ADLS_ACUITY_SCORE: 52
SWALLOWING: 2-->DIFFICULTY SWALLOWING LIQUIDS/FOODS
ADLS_ACUITY_SCORE: 40
ADLS_ACUITY_SCORE: 51
ADLS_ACUITY_SCORE: 63
ADLS_ACUITY_SCORE: 63
ADLS_ACUITY_SCORE: 50
ADLS_ACUITY_SCORE: 50
ADLS_ACUITY_SCORE: 52
CHANGE_IN_FUNCTIONAL_STATUS_SINCE_ONSET_OF_CURRENT_ILLNESS/INJURY: YES
ADLS_ACUITY_SCORE: 46
ADLS_ACUITY_SCORE: 41
SWALLOWING: 2-->DIFFICULTY SWALLOWING LIQUIDS/FOODS
ADLS_ACUITY_SCORE: 42
ADLS_ACUITY_SCORE: 50
ADLS_ACUITY_SCORE: 54
ADLS_ACUITY_SCORE: 50
ADLS_ACUITY_SCORE: 50
NUMBER_OF_TIMES_PATIENT_HAS_FALLEN_WITHIN_LAST_SIX_MONTHS: 1
EATING: 1-->ASSISTANCE (EQUIPMENT/PERSON) NEEDED (NOT DEVELOPMENTALLY APPROPRIATE)
ADLS_ACUITY_SCORE: 41
ADLS_ACUITY_SCORE: 52
ADLS_ACUITY_SCORE: 53
ADLS_ACUITY_SCORE: 52
ADLS_ACUITY_SCORE: 40
ADLS_ACUITY_SCORE: 63
ADLS_ACUITY_SCORE: 63
WALKING_OR_CLIMBING_STAIRS_DIFFICULTY: YES
DOING_ERRANDS_INDEPENDENTLY_DIFFICULTY: YES
TOILETING_ISSUES: YES
ADLS_ACUITY_SCORE: 63
ADLS_ACUITY_SCORE: 40
ADLS_ACUITY_SCORE: 43
ADLS_ACUITY_SCORE: 52
ADLS_ACUITY_SCORE: 40
WALKING_OR_CLIMBING_STAIRS: AMBULATION DIFFICULTY, DEPENDENT
ADLS_ACUITY_SCORE: 52
ADLS_ACUITY_SCORE: 46
ADLS_ACUITY_SCORE: 53
ADLS_ACUITY_SCORE: 50
ADLS_ACUITY_SCORE: 63
ADLS_ACUITY_SCORE: 53
FALL_HISTORY_WITHIN_LAST_SIX_MONTHS: YES
ADLS_ACUITY_SCORE: 52
TOILETING_MANAGEMENT: INC
ADLS_ACUITY_SCORE: 50
ADLS_ACUITY_SCORE: 43
ADLS_ACUITY_SCORE: 37
ADLS_ACUITY_SCORE: 50
DRESS: 2-->COMPLETELY DEPENDENT (NOT DEVELOPMENTALLY APPROPRIATE)
TOILETING: 1-->ASSISTANCE (EQUIPMENT/PERSON) NEEDED (NOT DEVELOPMENTALLY APPROPRIATE)
ADLS_ACUITY_SCORE: 50
DRESS: 0-->ASSISTANCE NEEDED (DEVELOPMENTALLY APPROPRIATE)
ADLS_ACUITY_SCORE: 50
ADLS_ACUITY_SCORE: 43
ADLS_ACUITY_SCORE: 63
ADLS_ACUITY_SCORE: 50
ADLS_ACUITY_SCORE: 43
DRESS: 1-->ASSISTANCE (EQUIPMENT/PERSON) NEEDED
ADLS_ACUITY_SCORE: 42
ADLS_ACUITY_SCORE: 50
ADLS_ACUITY_SCORE: 50
ADLS_ACUITY_SCORE: 52
ADLS_ACUITY_SCORE: 52
DRESSING/BATHING_DIFFICULTY: YES
ADLS_ACUITY_SCORE: 37
ADLS_ACUITY_SCORE: 63
ADLS_ACUITY_SCORE: 50
ADLS_ACUITY_SCORE: 43
DIFFICULTY_EATING/SWALLOWING: NO
ADLS_ACUITY_SCORE: 50
COMMUNICATION: DIFFICULTY UNDERSTANDING
ADLS_ACUITY_SCORE: 52
WEAR_GLASSES_OR_BLIND: NO
ADLS_ACUITY_SCORE: 43
ADLS_ACUITY_SCORE: 50
ADLS_ACUITY_SCORE: 52
BATHING: 2-->COMPLETELY DEPENDENT (NOT DEVELOPMENTALLY APPROPRIATE)
ADLS_ACUITY_SCORE: 50
ADLS_ACUITY_SCORE: 46
ADLS_ACUITY_SCORE: 37
ADLS_ACUITY_SCORE: 46
ADLS_ACUITY_SCORE: 52
DRESSING/BATHING: BATHING DIFFICULTY, DEPENDENT
TRANSFERRING: 1-->ASSISTANCE (EQUIPMENT/PERSON) NEEDED (NOT DEVELOPMENTALLY APPROPRIATE)
ADLS_ACUITY_SCORE: 40
DRESS: 2-->COMPLETELY DEPENDENT
ADLS_ACUITY_SCORE: 41
ADLS_ACUITY_SCORE: 40
ADLS_ACUITY_SCORE: 50
EATING: 1-->ASSISTANCE (EQUIPMENT/PERSON) NEEDED
ADLS_ACUITY_SCORE: 52
ADLS_ACUITY_SCORE: 53
ADLS_ACUITY_SCORE: 54
ADLS_ACUITY_SCORE: 52
ADLS_ACUITY_SCORE: 52
ADLS_ACUITY_SCORE: 43
TRANSFERRING: 1-->ASSISTANCE (EQUIPMENT/PERSON) NEEDED
ADLS_ACUITY_SCORE: 50
ADLS_ACUITY_SCORE: 52
TOILETING: 1-->ASSISTANCE (EQUIPMENT/PERSON) NEEDED
DRESSING/BATHING: BATHING DIFFICULTY, ASSISTANCE 1 PERSON
ADLS_ACUITY_SCORE: 51
ADLS_ACUITY_SCORE: 37
ADLS_ACUITY_SCORE: 43
ADLS_ACUITY_SCORE: 43
ADLS_ACUITY_SCORE: 63
ADLS_ACUITY_SCORE: 47
DIFFICULTY_EATING/SWALLOWING: YES
ADLS_ACUITY_SCORE: 54
TOILETING: 2-->COMPLETELY DEPENDENT
ADLS_ACUITY_SCORE: 50
DRESSING/BATHING_DIFFICULTY: YES
ADLS_ACUITY_SCORE: 63
TOILETING_ASSISTANCE: TOILETING DIFFICULTY, ASSISTANCE 1 PERSON
ADLS_ACUITY_SCORE: 63
ADLS_ACUITY_SCORE: 50
ADLS_ACUITY_SCORE: 50
WALKING_OR_CLIMBING_STAIRS: TRANSFERRING DIFFICULTY, DEPENDENT
BATHING: 1-->ASSISTANCE NEEDED

## 2022-07-15 PROBLEM — C18.9 COLON CANCER METASTASIZED TO LIVER (H): Status: ACTIVE | Noted: 2019-11-08

## 2022-07-15 PROBLEM — C78.7 COLON CANCER METASTASIZED TO LIVER (H): Status: ACTIVE | Noted: 2019-11-08

## 2022-07-15 PROBLEM — G20.A1 PARKINSON DISEASE (H): Status: ACTIVE | Noted: 2018-05-23

## 2022-07-15 PROBLEM — S72.002A HIP FRACTURE, LEFT, CLOSED, INITIAL ENCOUNTER (H): Status: ACTIVE | Noted: 2022-01-01

## 2022-07-16 NOTE — ED PROVIDER NOTES
"EMERGENCY DEPARTMENT NOTE     Name: Kwan Yi    Age/Sex: 88 year old male   MRN: [id number]   Evaluation Date & Time:  No admission date for patient encounter.    PCP:    No primary care provider on file.   ED Provider: Nishant Longoria D.O.       CHIEF COMPLAINT    Fall and Hip Pain       DIAGNOSIS & DISPOSITION     1. Hip fracture, left, closed, initial encounter (H)      DISPOSITION: Admit to [facility]    At the conclusion of the encounter I discussed the results of all of the tests and the disposition. The questions were answered. The patient or family acknowledged understanding and was agreeable with the care plan.    TOTAL CRITICAL CARE TIME (EXCLUDING PROCEDURES): Not applicable    PROCEDURES:   None    EMERGENCY DEPARTMENT COURSE/MEDICAL DECISION MAKING   9:00 PM I met with the patient to gather history and to perform my initial exam.  We discussed treatment options and the plan for care while in the Emergency Department.  9:08 PM Discussed with Dr. Pérez, [facility].  9:22 PM Discussed with Dr. Sabillon, hospitalist, who accepts patient for admission.    Kwan Yi is a 88 year old male with relevant past history of colon cancer, Parkinson's disease, who presents to the emergency department for evaluation of a fall, left hip pain.  Triage note reviewed:    Pt was over [facility] urgent care after a mechanical fall around 1820 today. Pt hx of parkinson's. Xray shows hip fracture on the L side, images should be pushed to us.     Vital signs:BP (!) 144/79   Pulse 60   Temp 97.5  F (36.4  C) (Temporal)   Resp 18   Ht 1.727 m (5' 8\")   Wt 62.1 kg (137 lb)   SpO2 98%   BMI 20.83 kg/m    Pertinent physical exam findings:  General: Alert, patient reports minimal no pain while seated  HEENT: Head atraumatic, cervical spine nontender  Musculoskeletal: Tenderness over the greater trochanter left hip.  Left ankle and left knee nontender  Diagnostic studies:  Imaging:  No orders to " display     Lab:  Labs Ordered and Resulted from Time of ED Arrival to Time of ED Departure   COMPREHENSIVE METABOLIC PANEL - Abnormal       Result Value    Sodium 143      Potassium 4.2      Chloride 105      Carbon Dioxide (CO2) 27      Anion Gap 11      Urea Nitrogen 33 (*)     Creatinine 1.26      Calcium 9.4      Glucose 103      Alkaline Phosphatase 96      AST 28      ALT <9      Protein Total 7.1      Albumin 3.8      Bilirubin Total 1.5 (*)     GFR Estimate 55 (*)    CBC WITH PLATELETS AND DIFFERENTIAL - Abnormal    WBC Count 14.8 (*)     RBC Count 4.65      Hemoglobin 14.5      Hematocrit 43.8      MCV 94      MCH 31.2      MCHC 33.1      RDW 13.2      Platelet Count 193      % Neutrophils 87      % Lymphocytes 6      % Monocytes 6      % Eosinophils 0      % Basophils 0      % Immature Granulocytes 1      NRBCs per 100 WBC 0      Absolute Neutrophils 13.1 (*)     Absolute Lymphocytes 0.9      Absolute Monocytes 0.8      Absolute Eosinophils 0.0      Absolute Basophils 0.0      Absolute Immature Granulocytes 0.1      Absolute NRBCs 0.0     COVID-19 VIRUS (CORONAVIRUS) BY PCR      EKG Interpretation: Sinus bradycardia. Right bundle branch block.  No prior for comparison  Interventions:None  Medical decision making: Screening labs and EKG show no abnormality.  Report from the [facility] urgent care reviewed which showed a femoral neck fracture.  I discussed case Dr. Pérez and the hospitalist service and the patient will be admitted, pain management as needed, n.p.o. after midnight, orthopedic consultation in a.m. for expected ORIF.    ED INTERVENTIONS     Medications   lidocaine 1 % 0.1-1 mL (has no administration in time range)   lidocaine (LMX4) cream (has no administration in time range)   sodium chloride (PF) 0.9% PF flush 3 mL (3 mLs Intracatheter Given 7/15/22 2218)   sodium chloride (PF) 0.9% PF flush 3 mL (has no administration in time range)   melatonin tablet 1 mg (1 mg Oral Given 7/15/22  2219)   acetaminophen (TYLENOL) tablet 325 mg (325 mg Oral Given 7/15/22 2219)   HYDROmorphone (DILAUDID) injection 0.2 mg (has no administration in time range)       DISCHARGE MEDICATIONS        Review of your medicines      You have not been prescribed any medications.         INFORMATION SOURCE AND LIMITATIONS    History/Exam limitations: None  Patient information was obtained from: Patient, family  Use of : N/A    HISTORY OF PRESENT ILLNESS   Kwan Yi is a 88 year old male with a relevant past history of colon cancer, Parkinson's disease who presents to this ED by private car with  for evaluation of a fall, left hip pain. Patient presents with family who reports patient has history of Parkinson's and tends to fall frequently. Patient had witnessed mechanical fall earlier today around 6:20 PM after tripping on sand walking to family boat. He did not hit his head and no LOC. He fell onto his left side hitting his left shoulder, elbow and hip. Patient endorses left hip pain. He denies any left shoulder or elbow pain. Denies any neck or back pain. Patient was seen at Urgent Care earlier this evening with x-ray that showed left femoral neck fracture. Patient requested transfer to this facility for admission as he lives close by. He denies any other pain or injuries.    Patient and family deny any recent fevers, cough, or other URI symptoms. Family denies any other concerns.    Per chart review, patient was seen at [facility] earlier this evening after a mechanical fall. X-ray showed left femoral neck fracture. Patient transferred to [facility] ED for admission.    REVIEW OF SYSTEMS:   Constitutional: Negative for fever.   HENT: Negative for URI symptoms, sore throat, head injury.    Eyes: Negative for visual disturbance.   Cardiac: Negative for chest pain, palpitations, near syncope or syncope  Respiratory: Negative for cough and shortness of  "breath.    Gastrointestinal: Negative for abdominal pain, nausea, vomiting, constipation, diarrhea, rectal bleeding or melena.  Genitourinary: Negative for dysuria, flank pain and hematuria.   Musculoskeletal: Negative for back pain, neck pain, left shoulder or elbow pain. Positive for left hip pain.   Skin: Negative for rash  Neurological: Negative for dizziness, headache, syncope, speech difficulty, unilateral weakness or imbalance with walking.   Hematological: Negative for adenopathy. Does not bruise/bleed easily.   Psychiatric/Behavioral: Negative for confusion.       PATIENT HISTORY   History reviewed. No pertinent past medical history.  Patient Active Problem List   Diagnosis     Hip fracture, left, closed, initial encounter (H)     Colon cancer metastasized to liver (H)     Parkinson disease (H)     History reviewed. No pertinent surgical history.  Social Histrory  Smoking:  Alcohol Use:  No Known Allergies    OUTPATIENT MEDICATIONS     New Prescriptions    No medications on file      Vitals:    07/15/22 2048 07/15/22 2200 07/15/22 2230   BP: 119/74 (!) 149/78 (!) 144/79   Pulse: 73 64 60   Resp: 16  18   Temp: 97.5  F (36.4  C)     TempSrc: Temporal     SpO2: 95% 98%    Weight: 62.1 kg (137 lb)     Height: 1.727 m (5' 8\")         Physical Exam   Constitutional: Oriented to person, place, and time. Appears well-developed and well-nourished.   HEENT:    Head: Atraumatic.   Neck: Normal range of motion. Neck supple.   Cardiovascular: Normal rate, regular rhythm and normal heart sounds.    Pulmonary/Chest: Normal effort  and breath sounds normal.   Abdominal: Soft. Bowel sounds are normal. Colostomy bag.  Musculoskeletal: Normal range of motion. Left hip tenderness.  Neurological: Alert and oriented to person, place, and time. Normal strength. No sensory deficit. No cranial nerve deficit.  Skin: Skin is warm and dry.   Psychiatric: Normal mood and affect. Behavior is normal. Thought content " normal.      DIAGNOSTICS    LABORATORY FINDINGS (REVIEWED AND INTERPRETED):  Labs Ordered and Resulted from Time of ED Arrival to Time of ED Departure   COMPREHENSIVE METABOLIC PANEL - Abnormal       Result Value    Sodium 143      Potassium 4.2      Chloride 105      Carbon Dioxide (CO2) 27      Anion Gap 11      Urea Nitrogen 33 (*)     Creatinine 1.26      Calcium 9.4      Glucose 103      Alkaline Phosphatase 96      AST 28      ALT <9      Protein Total 7.1      Albumin 3.8      Bilirubin Total 1.5 (*)     GFR Estimate 55 (*)    CBC WITH PLATELETS AND DIFFERENTIAL - Abnormal    WBC Count 14.8 (*)     RBC Count 4.65      Hemoglobin 14.5      Hematocrit 43.8      MCV 94      MCH 31.2      MCHC 33.1      RDW 13.2      Platelet Count 193      % Neutrophils 87      % Lymphocytes 6      % Monocytes 6      % Eosinophils 0      % Basophils 0      % Immature Granulocytes 1      NRBCs per 100 WBC 0      Absolute Neutrophils 13.1 (*)     Absolute Lymphocytes 0.9      Absolute Monocytes 0.8      Absolute Eosinophils 0.0      Absolute Basophils 0.0      Absolute Immature Granulocytes 0.1      Absolute NRBCs 0.0     COVID-19 VIRUS (CORONAVIRUS) BY PCR       IMAGING (REVIEWED AND INTERPRETED):  No orders to display       ECG (REVIEWED AND INTERPRETED):   ECG:   Performed at: 22:24  HR:  59 bpm  Rhythm: Sinus  Axis: 556, -39, 35  QRS duration: 150 ms  QTC: 471 ms  Interpretation: Sinus bradycardia. Right bundle branch block.  No prior for comparison    I have reviewed the patient's ECG, with comments made as listed above. Please see scanned image for full interpretation.       I, Kaden Larson, am serving as a scribe to document services personally performed by Nishant Longoria D.O., based on my observation and the provider s statements to me.    I, Nishant Longoria D.O., attest that Kaden Larson is acting in a scribe capacity, has observed my performance of the services and has documented them in accordance with my  direction.    Nishant Longoria D.O.  EMERGENCY MEDICINE   07/15/22  [facility] EMERGENCY DEPARTMENT  [facility] [facility] [id number] [phone]  Dept: [phone]     Nishant Longoria DO  07/15/22 2246

## 2022-07-16 NOTE — ED NOTES
Camera turned on in room 7. ED Tech monitoring patient via video. Patient informed about camera being on.

## 2022-07-16 NOTE — ANESTHESIA CARE TRANSFER NOTE
Patient: Kwan Yi    Procedure: Procedure(s):  HEMIARTHROPLASTY, HIP, BIPOLAR       Diagnosis: Hip fracture (H) [S72.009A]  Diagnosis Additional Information: No value filed.    Anesthesia Type:   Spinal     Note:    Oropharynx: oropharynx clear of all foreign objects and spontaneously breathing  Level of Consciousness: drowsy  Oxygen Supplementation: face mask  Level of Supplemental Oxygen (L/min / FiO2): 8  Independent Airway: airway patency satisfactory and stable  Dentition: dentition unchanged  Vital Signs Stable: post-procedure vital signs reviewed and stable  Report to RN Given: handoff report given  Patient transferred to: PACU    Handoff Report: Identifed the Patient, Identified the Reponsible Provider, Reviewed the pertinent medical history, Discussed the surgical course, Reviewed Intra-OP anesthesia mangement and issues during anesthesia, Set expectations for post-procedure period and Allowed opportunity for questions and acknowledgement of understanding      Vitals:  Vitals Value Taken Time   /92 07/16/22 1338   Temp 37.1  C (98.7  F) 07/16/22 1338   Pulse 59 07/16/22 1338   Resp 15 07/16/22 1338   SpO2 100 % 07/16/22 1337   Vitals shown include unvalidated device data.    Electronically Signed By: NORA Welch CRNA  July 16, 2022  1:38 PM

## 2022-07-16 NOTE — ED NOTES
Expected Patient Referral to ED  8:05 PM    Referring Clinic/Provider:  [facility] urgent care    Reason for referral/Clinical facts:  Patient fell while boating.  X-rays show a right femoral neck fracture.  Patient wished to be transferred to this facility secondary to living close by.    Recommendations provided:  Send to ED for further evaluation    Caller was informed that this institution does possess the capabilities and/or resources to provide for patient and should be transferred to our facility.    Discussed that if direct admit is sought and any hurdles are encountered, this ED would be happy to see the patient and evaluate.    Informed caller that recommendations provided are recommendations based only on the facts provided and that they responsible to accept or reject the advice, or to seek a formal in person consultation as needed and that this ED will see/treat patient should they arrive.      Nishant Longoria DO  [facility] EMERGENCY DEPARTMENT  [address] [place] [id number]  [phone]       Nishant Longoria DO  07/15/22 2006

## 2022-07-16 NOTE — ED NOTES
VSS, neuros at baseline (HX of Parkinson's). Strong pedal pulses bilaterally, pain free. Evening meds given.

## 2022-07-16 NOTE — PHARMACY-ADMISSION MEDICATION HISTORY
Pharmacy Note - Admission Medication History     ______________________________________________________________________    Prior To Admission (PTA) med list completed and updated in EMR.       PTA Med List   Medication Sig Last Dose     carbidopa-levodopa (SINEMET)  MG tablet Take 1.5 tablets by mouth 3 times daily 7/15/2022 at 1800     famotidine (PEPCID) 20 MG tablet Take 20 mg by mouth 2 times daily 7/15/2022 at Unknown time     latanoprost (XALATAN) 0.005 % ophthalmic solution Place 1 drop into both eyes At Bedtime 7/14/2022 at Unknown time       Information source(s): Family member, Prescription bottles and CareEverywhere/SureScripts  Method of interview communication: in-person    Summary of Changes to PTA Med List  New: All  Discontinued: None  Changed: None    Patient was asked about OTC/herbal products specifically.  PTA med list reflects this.    In the past week, patient estimated taking medication this percent of the time:  greater than 90%.    Allergies were reviewed, assessed, and updated with the patient.      Medications available for use during hospital stay: Latanoprost 0.005% Ophthalmic Solution.     The information provided in this note is only as accurate as the sources available at the time of the update(s).    Thank you for the opportunity to participate in the care of this patient.    NEETU MALDONADO  7/15/2022 11:07 PM

## 2022-07-16 NOTE — OP NOTE
Operative Note    Name:  Kwan Yi  PCP:  No Ref-Primary, Physician  Procedure Date:  7/15/2022 - 7/16/2022    Pre-Procedure Diagnosis:  1.  Displaced left femoral neck fracture    Post-Procedure Diagnosis:    1.  Displaced left femoral neck fracture    Procedure: Left unipolar hip hemiarthroplasty    Surgeon(s):  Gerardo Matos MD    Assistant: Malathi Xiong PA-C  The PAs assistance was necessary in soft tissue retraction, manipulation of the operative extremity, closure of the wound, dressing application, and for overall progression and efficiency of the case.      Anesthesia Type:  Spinal    Estimated Blood Loss:   150 cc    Complications:    None    Implants: DePuy [place] cemented basic size 5 stem.  -3 unipolar 49 mm head.  Tobramycin-containing cement.    Indication for procedure: The patient is a pleasant 88-year-old male with history of rectal cancer and Parkinson's disease.  He sustained a mechanical fall onto his left hip yesterday.  He was seen in urgent care and eventually the emergency department for a left femoral neck fracture.  He was admitted to the hospitalist service, and optimized for surgery. We discussed the option for operative intervention in the form of a left hip hemiarthroplasty.    I discussed this with the patient, his daughter Ruchi, and his wife Betty who is his decision-maker.  We discussed the benefit of surgery is mobilization and weightbearing immediately after surgery.  We discussed the risks of surgery with the risks including but not limited to the risk of anesthesia, heart attack, stroke, blood clot, pneumonia, and death.  We discussed the risks of surgery including but not limited to bleeding, infection, damage to surrounding structures like blood vessels and nerves, wound healing issues, postoperative numbness that may or may not resolve, leg length discrepancy, instability, fracture, need for further surgery.  Ultimately after thorough discussion and using  a shared decision-making process, the patient and his family elected to proceed with surgery.  Signed informed consent was obtained and placed in the chart.      Procedure: The patient was met in the preoperative holding area.  The correct surgical site was marked with the patient's participation.  Informed consent was again reviewed with the patient and his wife and all questions were answered to their satisfaction.  The patient was transferred into the operating theater.  He had successful induction of spinal anesthesia by our anesthesia colleagues.  A Kevin catheter was placed.  He was transferred onto the operating table and placed in the lateral decubitus position with the left side up.  An axillary roll was placed.  The patient was then secured to the table using the Paulson hip positioner, and was secured to the table using a safety strap.  All bony prominences were well-padded.  The left lower extremity was then prepped and draped in the usual sterile fashion.  A timeout was performed with all members the operating team participating per hospital policy.  The correct patient, site, and procedure were all confirmed.  All in attendance were in agreement.  Preoperative antibiotic administration was confirmed.    Posterior approach was taken to the hip.  A longitudinal incision over the posterior third of the greater trochanter was made with slight posterior curve.  Dissection was taken down through the subcutaneous tissue to the IT band.  This was split in line with the skin incision, and the gluteus nomi muscle was split.  The East-West retractor was then placed with care to protect the sciatic nerve from the retractor.  The greater trochanteric bursa was dissected off the posterior aspect of the proximal femur.  The piriformis was identified and tagged and released from the insertion on the greater trochanter..  The External Rotators Were Then Released off the Posterior Aspect in a Sleeve, and Tagged Using  #5 Ethibond for Later Repair. The fracture was identified at the femoral neck.  Cristopher's were placed around the femoral neck, and using the appropriate template, the neck cut was freshened.  The femoral head was removed from the acetabulum, it was measured at approximately 49 mm. A 49 mm head trial was placed and this demonstrated excellent stability.    Attention was directed towards the femur.  The box chisel was used with appropriate anteversion.  The canal finder was then used to initiate reaming.  I then reamed and used the lateralizer.  I reamed up to a cemented 5.  I then sequentially broached up to a cemented 5 stem which had adequate stability.  A trial using a +1.5 bipolar was shown to have a slightly long leg length but was stable.  A calcar planer was utilized. The trial components were removed. The canal was prepared using the pulse lavage, a canal brush. A * cement restrictor was placed. The canal was pulsatile lavaged. A vent tube sponge was placed.  3 batches of tobramycin-containing cement were mixed for 120 seconds. These were introduced in a retrograde fashion. The stem was introduced and held in appropriate anteversion until the cement had hardened. Any excess cement was carefully removed. Trial reduction was carried out with the monopolar -3 head, and good reproduction of the leg length and excellent stability was demonstrated. The final components were placed. The hip was reduced. The joint was copiously irrigated. The piriformis and capsule were repaired back to the greater trochanter through drill holes in bone.  The joint was again copiously irrigated.      The IT band was repaired using 0 Vicryl in interrupted fashion followed by a running #1 strata fix.  The dermal layer was reapproximated using 2-0 Vicryl in buried interrupted fashion.  Running 3-0 Monocryl and skin glue was then used to close the skin.  A sterile dressing was applied.   An abductor pillow placed.  The patient was then  awakened from anesthesia and transferred to the PACU in stable condition.  All sponge and needle counts were correct in the case.    Post-operative plan  -Weightbearing as tolerated left lower extremity.  Posterior hip precautions.  -24 hours IV antibiotics   -subcu heparin for DVT prophylaxis while in house, discharge on aspirin 325 mg daily  -No dressing change needed unless saturated  -Follow-up in 2 weeks for wound check  -Disposition planning      Gerardo Matos MD    Date: 7/16/2022  Time: 1:21 PM      Implant Name Type Inv. Item Serial No.  Lot No. LRB No. Used Action   PLUG CEMENT FRAIRE W/INSERT 18.5 [id number] - [id number] Total Joint Component/Insert PLUG CEMENT FRAIRE W/INSERT 18.5 [id number]  [employer]  Left 1 Implanted   BONE CEMENT SIMPLEX W/TOBRAMYCIN 6197-9-001 - [id number] Cement, Bone BONE CEMENT SIMPLEX W/TOBRAMYCIN 6197-9-001  [employer] [id number] Left 2 Implanted   BONE CEMENT SIMPLEX W/TOBRAMYCIN 6197-9-001 - [id number] Cement, Bone BONE CEMENT SIMPLEX W/TOBRAMYCIN 6197-9-001  [employer] [id number] Left 1 Implanted   IMP STEM FEMORAL HIP DEPUY SUMMIT SZ 5 1570- - [id number] Total Joint Component/Insert IMP STEM FEMORAL HIP DEPUY SUMMIT SZ 5 1570-  [employer]- [id number] Left 1 Implanted   IMP HEAD FEMORAL UNIPOLAR MODULE 49MM 1363- - [id number] Total Joint Component/Insert IMP HEAD FEMORAL UNIPOLAR MODULE 49MM 1363-  [employer]- [id number] Left 1 Implanted   SPACER TAPERED ARTICUL/NINA -3 - [id number] Total Joint Component/Insert SPACER TAPERED ARTICUL/NINA -3  [employer]- [id number] Left 1 Implanted

## 2022-07-16 NOTE — PROGRESS NOTES
Daily Progress Note        CODE STATUS:  Full Code    Date of visit; 07/16/22    Length of stay (  LOS: 1 day  )     Assessment/Plan:  88-year-old male with past medical history of Parkinson's disease was brought to ED for evaluation of left hip pain after mechanical fall, found to have left hip fracture, underwent surgery and admitted for further management    Mechanical fall and sustained displaced left femur neck fracture;  -- On 7/16, s/p left unipolar hip hemiarthroplasty by Dr. Matos  --Postoperative care, DVT prophylaxis per orthopedic team  --Is scheduled Tylenol, try to avoid narcotics  -- PT OT consulted    Leukocytosis on admission; likely stress  -- No reported recent febrile illness.  No reported respiratory/GI/ symptoms.  --Recheck normal    Acute blood loss, postoperative;  -- Operative note reported  mL  -- Check hemoglobin in a.m.    Acute metabolic encephalopathy; likely due to narcotic medication, different surroundings  -- Initiated delirium protocol  -- Personally discussed with patient's nurse and charge nurse for close monitoring and one-to-one observation if needed    Left upper and lower extremity scattered superficial bruises due to fall;  -- Continue local care and clinical monitoring history of colon cancer status post    History of colon s/p post surgery and colostomy bag;  -- Patient's daughter reported on remission and taking daily MiraLAX, ordered  -- Continue colostomy bag care    History of parkinsonism;  -- Continue home medications    History of sinus bradycardia and RBBB  -- Previous EKG reviewed.    --Normal potassium and magnesium    DVT prophylaxis per orthopedic team      Disposition; pending  Barrier to discharge; postop recovery      Subjective:  Interval History: Patient is new to me.  Patient seen and examined. Notes, labs, imaging reports personally reviewed.  Patient just transferred out of the surgery.  Patient sleeping, opens eyes on verbal command and not  following commands much.  Patient looks very comfortable.  Discussed with patient's nurse.  Discussed with nursing staff and nursing supervisor as patient may need one-to-one observation  Discussed with patient's daughter in detail about ongoing medical issues and management, answered all questions to best of my knowledge.    Review of Systems:   As mentioned in subjective.    Patient Active Problem List   Diagnosis     Hip fracture, left, closed, initial encounter (H)     Colon cancer metastasized to liver (H)     Parkinson disease (H)       Scheduled Meds:    carbidopa-levodopa  1 tablet Oral TID    And     carbidopa-levodopa  1 half-tab Oral TID     ceFAZolin  2 g Intravenous See Admin Instructions     famotidine  20 mg Oral Q24H     heparin ANTICOAGULANT  5,000 Units Subcutaneous Q8H     latanoprost  1 drop Both Eyes At Bedtime     sodium chloride (PF)  3 mL Intracatheter Q8H     sodium chloride (PF)  3 mL Intracatheter Q8H     Continuous Infusions:    lactated ringers 10 mL/hr at 07/16/22 0924     PRN Meds:.acetaminophen, HYDROmorphone, lidocaine 4%, lidocaine 4%, lidocaine (buffered or not buffered), lidocaine (buffered or not buffered), melatonin, sodium chloride (PF), sodium chloride (PF)    Objective:  Vital signs in last 24 hours:  Temp:  [97.5  F (36.4  C)-98.7  F (37.1  C)] 98  F (36.7  C)  Pulse:  [54-73] 54  Resp:  [13-28] 13  BP: ()/(54-92) 108/60  SpO2:  [92 %-100 %] 92 %      Intake/Output Summary (Last 24 hours) at 7/16/2022 1506  Last data filed at 7/16/2022 1314  Gross per 24 hour   Intake 703 ml   Output 700 ml   Net 3 ml       Physical Exam:  General: Not in obvious distress.  HEENT: Normocephalic, supple neck  Chest: Clear to auscultation bilateral anteriorly, no wheezing  Heart: S1S2 normal, regular  Abdomen: Soft.  No grimace appreciated.  Colostomy bag in place.  Kevin catheter in place  Extremities: No legs swelling  Neuro: Sleepy, opening eyes on verbal command, moving upper  extremities spontaneously, lower extremity on hip abduction pillow, noticed upper extremity tremor      Lab Results:(I have personally reviewed the results)    Recent Results (from the past 24 hour(s))   Comprehensive metabolic panel    Collection Time: 07/15/22  9:32 PM   Result Value Ref Range    Sodium 143 136 - 145 mmol/L    Potassium 4.2 3.5 - 5.0 mmol/L    Chloride 105 98 - 107 mmol/L    Carbon Dioxide (CO2) 27 22 - 31 mmol/L    Anion Gap 11 5 - 18 mmol/L    Urea Nitrogen 33 (H) 8 - 28 mg/dL    Creatinine 1.26 0.70 - 1.30 mg/dL    Calcium 9.4 8.5 - 10.5 mg/dL    Glucose 103 70 - 125 mg/dL    Alkaline Phosphatase 96 45 - 120 U/L    AST 28 0 - 40 U/L    ALT <9 0 - 45 U/L    Protein Total 7.1 6.0 - 8.0 g/dL    Albumin 3.8 3.5 - 5.0 g/dL    Bilirubin Total 1.5 (H) 0.0 - 1.0 mg/dL    GFR Estimate 55 (L) >60 mL/min/1.73m2   CBC with platelets and differential    Collection Time: 07/15/22  9:32 PM   Result Value Ref Range    WBC Count 14.8 (H) 4.0 - 11.0 10e3/uL    RBC Count 4.65 4.40 - 5.90 10e6/uL    Hemoglobin 14.5 13.3 - 17.7 g/dL    Hematocrit 43.8 40.0 - 53.0 %    MCV 94 78 - 100 fL    MCH 31.2 26.5 - 33.0 pg    MCHC 33.1 31.5 - 36.5 g/dL    RDW 13.2 10.0 - 15.0 %    Platelet Count 193 150 - 450 10e3/uL    % Neutrophils 87 %    % Lymphocytes 6 %    % Monocytes 6 %    % Eosinophils 0 %    % Basophils 0 %    % Immature Granulocytes 1 %    NRBCs per 100 WBC 0 <1 /100    Absolute Neutrophils 13.1 (H) 1.6 - 8.3 10e3/uL    Absolute Lymphocytes 0.9 0.8 - 5.3 10e3/uL    Absolute Monocytes 0.8 0.0 - 1.3 10e3/uL    Absolute Eosinophils 0.0 0.0 - 0.7 10e3/uL    Absolute Basophils 0.0 0.0 - 0.2 10e3/uL    Absolute Immature Granulocytes 0.1 <=0.4 10e3/uL    Absolute NRBCs 0.0 10e3/uL   Adult Type and Screen    Collection Time: 07/15/22  9:32 PM   Result Value Ref Range    ABO/RH(D) A NEG     Antibody Screen Negative Negative    SPECIMEN EXPIRATION DATE [id number]    Asymptomatic COVID-19 Virus (Coronavirus) by PCR  Nasopharyngeal    Collection Time: 07/15/22  9:34 PM    Specimen: Nasopharyngeal; Swab   Result Value Ref Range    SARS CoV2 PCR Negative Negative   ECG 12-LEAD WITH MUSE (LHE)    Collection Time: 07/15/22 10:24 PM   Result Value Ref Range    Systolic Blood Pressure 149 mmHg    Diastolic Blood Pressure 78 mmHg    Ventricular Rate 59 BPM    Atrial Rate 59 BPM    PA Interval 164 ms    QRS Duration 150 ms     ms    QTc 471 ms    P Axis 56 degrees    R AXIS -39 degrees    T Axis 35 degrees    Interpretation ECG       Sinus bradycardia with sinus arrhythmia  Left axis deviation  Right bundle branch block  Abnormal ECG  No previous ECGs available  Confirmed by SEE ED PROVIDER NOTE FOR, ECG INTERPRETATION ([id number]),  SILVERIO HARRIS ([id number]) on 7/16/2022 9:14:36 AM     Basic metabolic panel    Collection Time: 07/16/22 10:40 AM   Result Value Ref Range    Sodium 141 136 - 145 mmol/L    Potassium 3.8 3.5 - 5.0 mmol/L    Chloride 105 98 - 107 mmol/L    Carbon Dioxide (CO2) 28 22 - 31 mmol/L    Anion Gap 8 5 - 18 mmol/L    Urea Nitrogen 26 8 - 28 mg/dL    Creatinine 0.98 0.70 - 1.30 mg/dL    Calcium 8.6 8.5 - 10.5 mg/dL    Glucose 110 70 - 125 mg/dL    GFR Estimate 74 >60 mL/min/1.73m2   CBC with platelets and differential    Collection Time: 07/16/22 10:40 AM   Result Value Ref Range    WBC Count 8.4 4.0 - 11.0 10e3/uL    RBC Count 4.30 (L) 4.40 - 5.90 10e6/uL    Hemoglobin 13.3 13.3 - 17.7 g/dL    Hematocrit 40.1 40.0 - 53.0 %    MCV 93 78 - 100 fL    MCH 30.9 26.5 - 33.0 pg    MCHC 33.2 31.5 - 36.5 g/dL    RDW 12.8 10.0 - 15.0 %    Platelet Count 154 150 - 450 10e3/uL    % Neutrophils 82 %    % Lymphocytes 9 %    % Monocytes 7 %    % Eosinophils 1 %    % Basophils 0 %    % Immature Granulocytes 1 %    NRBCs per 100 WBC 0 <1 /100    Absolute Neutrophils 6.9 1.6 - 8.3 10e3/uL    Absolute Lymphocytes 0.8 0.8 - 5.3 10e3/uL    Absolute Monocytes 0.6 0.0 - 1.3 10e3/uL    Absolute Eosinophils 0.1 0.0 - 0.7 10e3/uL     Absolute Basophils 0.0 0.0 - 0.2 10e3/uL    Absolute Immature Granulocytes 0.0 <=0.4 10e3/uL    Absolute NRBCs 0.0 10e3/uL         Serum Glucose range:   Recent Labs   Lab 07/16/22  1040 07/15/22  2132    103     ABG: No lab results found in last 7 days.  CBC:   Recent Labs   Lab 07/16/22  1040 07/15/22  2132   WBC 8.4 14.8*   HGB 13.3 14.5   HCT 40.1 43.8   MCV 93 94    193   NEUTROPHIL 82 87   LYMPH 9 6   MONOCYTE 7 6   EOSINOPHIL 1 0     Chemistry:   Recent Labs   Lab 07/16/22  1040 07/15/22  2132    143   POTASSIUM 3.8 4.2   CHLORIDE 105 105   CO2 28 27   BUN 26 33*   CR 0.98 1.26   GFRESTIMATED 74 55*   SELINA 8.6 9.4   PROTTOTAL  --  7.1   ALBUMIN  --  3.8   AST  --  28   ALT  --  <9   ALKPHOS  --  96   BILITOTAL  --  1.5*     Coags:  No results for input(s): INR, PROTIME, PTT in the last 168 hours.    Invalid input(s): APTT  Cardiac Markers:  No results for input(s): CKTOTAL, TROPONINI in the last 168 hours.     XR Pelvis w Hip Port Left  1 View    Result Date: 7/16/2022  EXAM: XR PELVIS AND HIP PORTABLE LEFT 1 VIEW LOCATION: [facility] DATE/TIME: 7/16/2022 1:56 PM INDICATION: Status post Hip surgery COMPARISON: 07/16/2022.     IMPRESSION: Left femoral fracture has been treated with a hemiarthroplasty and hip implant. Bones are demineralized. No dislocation.    XR Femur Left 2 Views    Result Date: 7/16/2022  EXAM: XR PELVIS 1/2 VW, XR FEMUR LEFT 2 VIEW LOCATION: [facility] DATE/TIME: 7/16/2022 8:27 AM INDICATION: Left hip pain. COMPARISON: None.     IMPRESSION: Acute mildly impacted nondisplaced subcapital fracture of the left femoral neck. No acute fracture in the pelvis or hips otherwise. Generalized demineralization. No concerning bone lesion. Normal alignment of the hip joints with minimal degenerative arthritic changes. Intact pelvic ring. Small radiodense foreign body projects over the anterior aspect of the left pelvis/hip. Moderate  advanced degenerative changes in the lower lumbar spine. The remainder of the left femur is normal. No other fracture or bone lesion. Normal knee joint alignment. No significant joint effusion. Minimal degenerative changes in the lateral compartment.    XR Pelvis 1/2 Views    Result Date: 7/16/2022  EXAM: XR PELVIS 1/2 VW, XR FEMUR LEFT 2 VIEW LOCATION: [facility] DATE/TIME: 7/16/2022 8:27 AM INDICATION: Left hip pain. COMPARISON: None.     IMPRESSION: Acute mildly impacted nondisplaced subcapital fracture of the left femoral neck. No acute fracture in the pelvis or hips otherwise. Generalized demineralization. No concerning bone lesion. Normal alignment of the hip joints with minimal degenerative arthritic changes. Intact pelvic ring. Small radiodense foreign body projects over the anterior aspect of the left pelvis/hip. Moderate advanced degenerative changes in the lower lumbar spine. The remainder of the left femur is normal. No other fracture or bone lesion. Normal knee joint alignment. No significant joint effusion. Minimal degenerative changes in the lateral compartment.    Latest radiology report personally reviewed.      The total time spent is 38 minutes, >50% time spent in coordination of care data gathering, charting, record review, discussion of test/medications/plan of care as mentioned above and disposition plan with patient's daughter as patient cannot participate in detail plan of care discussion. D/w consultants, nursing staffs and /CM.    Note created using dragon voice recognition software so sounds alike errors may have escaped editing.    07/16/2022   JENNIFER DAVID MD  HOSPITALIST, [facility]  PAGER NO. [phone]

## 2022-07-16 NOTE — H&P
Admission History and Physical   Kwan Yi,    1933,   [id number]    [facility]   Hip fracture, left, closed, initial encounter (H) [S72.002A]    PCP: No primary care provider on file.,    Code status:  No Order       No emergency contact information on file.       Active Problems:    Hip fracture, left, closed, initial encounter (H)       ASSESSMENT AND PLAN:  Left hip fracture, post mechanical fall.  No complications noted.  Admit to medicine.  Consult orthopedics for evaluation.  N.p.o. at midnight    Left hip pain due to above.  Begin scheduled Tylenol, add low-dose narcotics if needed    History of colon cancer, colostomy bag noted.  Work nurse consulted    Parkinson's disease stable, continue home meds, PT OT evaluation    Patient has no active cardiac or renal disease.  Profile Plus is quite active and ambulates with no chest pain or breathing difficulty.  METS appears stable and normal.  Will check EKG.  If unremarkable he is medically optimized for any proposed surgical procedure.    Full code full code    DVT PPX:  Heparin subacute    Barriers to discharge hip fracture evaluation    Anticipated Discharge date multiple days inpatient          Chief Complaint:  Fall tonight with hip pain    HPI:  Kwan Yi is a 88 year old old male who sustained a mechanical fall while walking today.  The fall was witnessed by family members.  No loss of consciousness.  He hit his left side on the ground.  Able to get up after fall but had pain in the left hip.  No headaches or chest pain prior, no breathing difficulty, no other systemic symptoms.  He went to urgent care where imaging showed hip fracture left.  He is admitted to the medical service for orthopedic evaluation.      Medical History  Above      Surgical History  He  has no past surgical history on file.      SOCIAL HISTORY:  Social History     Socioeconomic History     Marital status:      Spouse name: Not on  "file     Number of children: Not on file     Years of education: Not on file     Highest education level: Not on file   Occupational History     Not on file   Tobacco Use     Smoking status: Not on file     Smokeless tobacco: Not on file   Substance and Sexual Activity     Alcohol use: Not on file     Drug use: Not on file     Sexual activity: Not on file   Other Topics Concern     Not on file   Social History Narrative     Not on file     Social Determinants of Health     Financial Resource Strain: Not on file   Food Insecurity: Not on file   Transportation Needs: Not on file   Physical Activity: Not on file   Stress: Not on file   Social Connections: Not on file   Intimate Partner Violence: Not on file   Housing Stability: Not on file       FAMILY HISTORY:  History reviewed. No pertinent family history.      ALLERGIES:  No Known Allergies    MEDICATIONS:  Per pharmacy      ROS:  12 point review of systems reviewed and is negative except as stated above      PHYSICAL EXAM:  /74   Pulse 73   Temp 97.5  F (36.4  C) (Temporal)   Resp 16   Ht 1.727 m (5' 8\")   Wt 62.1 kg (137 lb)   SpO2 95%   BMI 20.83 kg/m      General: Alert and oriented x 3. Not in obvious distress.  HEENT: Pupils equal and reactive,ENT WNL   Neck- supple, No JVP elevation, lymphadenopathy or thyromegaly. Trachea-central.  Chest: Clear to auscultation bilaterally.  Heart: S1S2 regular. No M/R/G.  Abdomen: Soft. NT, ND. No organomegaly. Bowel sounds- active.  Colostomy bag noted  Back: No spine tenderness. No CVA tenderness.  Extremities: No leg swelling. Peripheral pulses 2+ bilaterally.  Neuro: No focal neurological deficit  Skin: no skin rashes     DIAGNOSTIC DATA:        EKG Results: Personally reviewed        Advanced Care Planning       Clyde Sabillon MD       "

## 2022-07-16 NOTE — PLAN OF CARE
Problem: Pain Acute  Goal: Acceptable Pain Control and Functional Ability  Outcome: Ongoing, Progressing: Pt showing no s/sx of pain. Had spinal during surgery.    Problem: Mood Impairment (Dementia Signs/Symptoms)  Goal: Improved Mood Symptoms (Dementia Signs/Symptoms)  Outcome: Ongoing, Not Progressing: Pt was resting comfortably. Pt has currently taken off his gown and pulled out his IV. SWAT and PICC have both been notified. Pt will be getting IV ABX this evening.     Pt arrived to the floor at 1510.  Started to wake up around 1730. Very fidgety.   There will be a 1:1 coming to sit with pt at 1900.

## 2022-07-16 NOTE — BRIEF OP NOTE
[facility]    Brief Operative Note    Pre-operative diagnosis: Hip fracture (H) [S72.009A]  Post-operative diagnosis Same as pre-operative diagnosis    Procedure: Procedure(s):  HEMIARTHROPLASTY, HIP, left  Surgeon: Surgeon(s) and Role:     * Gerardo Matos MD - Primary  Anesthesia: Spinal   Estimated Blood Loss: 150 cc    Drains: None  Specimens: * No specimens in log *  Findings:   femoral neck fracture. .  Complications: None.  Implants:   Implant Name Type Inv. Item Serial No.  Lot No. LRB No. Used Action   PLUG CEMENT FRAIRE W/INSERT 18.5 [id number] - [id number] Total Joint Component/Insert PLUG CEMENT FRAIRE W/INSERT 18.5 [id number]  [employer]  Left 1 Implanted   PLUG CEMENT FRAIRE W/INSERT 25MM 7875-7697 - [id number] Total Joint Component/Insert PLUG CEMENT FRAIRE W/INSERT 25MM [id number]  [employer]  Left 1 Wasted   BONE CEMENT SIMPLEX W/TOBRAMYCIN 6197-9-001 - [id number] Cement, Bone BONE CEMENT SIMPLEX W/TOBRAMYCIN 6197-9-001  [employer] [id number] Left 2 Implanted   BONE CEMENT SIMPLEX W/TOBRAMYCIN 6197-9-001 - [id number] Cement, Bone BONE CEMENT SIMPLEX W/TOBRAMYCIN 6197-9-001  [employer] [id number] Left 1 Implanted   IMP STEM FEMORAL HIP DEPUY SUMMIT SZ 5 1570- - [id number] Total Joint Component/Insert IMP STEM FEMORAL HIP DEPUY SUMMIT SZ 5 1570-  [employer]- [id number] Left 1 Implanted   IMP HEAD FEMORAL UNIPOLAR MODULE 49MM 1363- - [id number] Total Joint Component/Insert IMP HEAD FEMORAL UNIPOLAR MODULE 49MM 1363-  [employer]- [id number] Left 1 Implanted   SPACER TAPERED ARTICUL/NINA -3 - [id number] Total Joint Component/Insert SPACER TAPERED ARTICUL/NINA -3  [employer]- [id number] Left 1 Implanted       Gerardo Matos MD  [facility]

## 2022-07-16 NOTE — ANESTHESIA POSTPROCEDURE EVALUATION
Patient: Kwan Yi    Procedure: Procedure(s):  HEMIARTHROPLASTY, HIP, BIPOLAR       Anesthesia Type:  Spinal    Note:  Disposition: Inpatient   Postop Pain Control: Uneventful            Sign Out: Well controlled pain   PONV: No   Neuro/Psych: Uneventful            Sign Out: Acceptable/Baseline neuro status   Airway/Respiratory: Uneventful            Sign Out: Acceptable/Baseline resp. status   CV/Hemodynamics: Uneventful            Sign Out: Acceptable CV status; No obvious hypovolemia; No obvious fluid overload   Other NRE: NONE   DID A NON-ROUTINE EVENT OCCUR?            Last vitals:  Vitals Value Taken Time   /60 07/16/22 1445   Temp 36.7  C (98  F) 07/16/22 1430   Pulse 55 07/16/22 1456   Resp 14 07/16/22 1453   SpO2 92 % 07/16/22 1456   Vitals shown include unvalidated device data.    Electronically Signed By: Lynne Agustin MD  July 16, 2022  3:48 PM

## 2022-07-16 NOTE — ED NOTES
"Report given to Yareli in PACU. Per Yareli, patient going to PACU following Xray and heparin to be administered in ED before going. Patient already off floor at Xray.     Paged SIRI Palencia regarding scheduled heparin and pending surgery:  \"Patient to go to PACU after xray (now). Heparin ordered. Do you want heparin administered prior to surgery? Last dose administered ~12am today.\" Awaiting response.   "

## 2022-07-16 NOTE — ANESTHESIA PREPROCEDURE EVALUATION
Anesthesia Pre-Procedure Evaluation    Patient: Kwan Yi   MRN: [id number] : 1933        Procedure : Procedure(s):  HEMIARTHROPLASTY, HIP, BIPOLAR          History reviewed. No pertinent past medical history.   History reviewed. No pertinent surgical history.   No Known Allergies   Social History     Tobacco Use     Smoking status: Not on file     Smokeless tobacco: Not on file   Substance Use Topics     Alcohol use: Not on file      Wt Readings from Last 1 Encounters:   07/15/22 62.1 kg (137 lb)        Anesthesia Evaluation   Pt has had prior anesthetic. Type: General.    No history of anesthetic complications       ROS/MED HX  ENT/Pulmonary:  - neg pulmonary ROS     Neurologic: Comment: Mild cognitive impairment    (+) dementia (AD), Parkinson's disease (stable),     Cardiovascular:  - neg cardiovascular ROS  (-) hypertension, CAD and taking anticoagulants/antiplatelets (last heparin subcut 5000unit at 0015 (> 4-6hrs))   METS/Exercise Tolerance:     Hematologic:    (-) history of blood clots   Musculoskeletal:   (+) arthritis, fracture (L hip s/p mechanical fall),     GI/Hepatic:    (-) GERD, liver disease and esophageal disease   Renal/Genitourinary:     (+) renal disease, type: CRI,     Endo:  - neg endo ROS     Psychiatric/Substance Use:       Infectious Disease: Comment: Negative covid 7/15 - neg infectious disease ROS     Malignancy:   (+) Malignancy (hx of rectal ca s/p segmental resection. liver met present (single)),     Other:            Physical Exam    Airway        Mallampati: III   TM distance: > 3 FB   Neck ROM: full     Respiratory Devices and Support         Dental     Comment: Multiple fillings and crowns - no loose dentition        Cardiovascular   cardiovascular exam normal          Pulmonary   pulmonary exam normal                OUTSIDE LABS:  CBC:   Lab Results   Component Value Date    WBC 14.8 (H) 07/15/2022    HGB 14.5 07/15/2022    HCT 43.8 07/15/2022      07/15/2022     BMP:   Lab Results   Component Value Date     07/15/2022    POTASSIUM 4.2 07/15/2022    CHLORIDE 105 07/15/2022    CO2 27 07/15/2022    BUN 33 (H) 07/15/2022    CR 1.26 07/15/2022     07/15/2022     COAGS: No results found for: PTT, INR, FIBR  POC: No results found for: BGM, HCG, HCGS  HEPATIC:   Lab Results   Component Value Date    ALBUMIN 3.8 07/15/2022    PROTTOTAL 7.1 07/15/2022    ALT <9 07/15/2022    AST 28 07/15/2022    ALKPHOS 96 07/15/2022    BILITOTAL 1.5 (H) 07/15/2022     OTHER:   Lab Results   Component Value Date    SELINA 9.4 07/15/2022       Anesthesia Plan    ASA Status:  3   NPO Status:  NPO Appropriate    Anesthesia Type: Spinal.   Induction: Propofol.   Maintenance: TIVA.        Consents    Anesthesia Plan(s) and associated risks, benefits, and realistic alternatives discussed. Questions answered and patient/representative(s) expressed understanding.     - Discussed: Risks, Benefits and Alternatives for BOTH SEDATION and the PROCEDURE were discussed     - Discussed with:  Patient, Healthcare Power of          Postoperative Care    Pain management: Multi-modal analgesia.   PONV prophylaxis: Ondansetron (or other 5HT-3), Background Propofol Infusion     Comments:                Lynne Agustin MD

## 2022-07-16 NOTE — CONSULTS
ORTHOPEDIC CONSULTATION    CHIEF COMPLAINT: Left hip pain      HISTORY OF PRESENT ILLNESS:  The patient is seen in orthopedic consultation at the request of Talha Stein MD.  The patient is a 88 year old male with c/o left hip pain.  Patient has a history of rectal cancer and Parkinson's disease.  He lives in an independent living apartment with his wife who is his caregiver.  He has some dementia related to his Parkinson's.  He ambulates at baseline without a cane or a walker.  He denies any antecedent hip pain.  He was heading out to go boating with his family yesterday when he sustained a mechanical fall onto his left hip.  He was unable to ambulate.  He was seen at [facility] urgent care and diagnosed with a femoral neck fracture.  He then presented to the [facility] emergency department and was admitted to the hospitalist service.  Today, he reports that his pain is under good control.  He reports pain only in his left hip.  He denies any previous injury to the hip.  He has otherwise been in his usual state of health recently he does not remember falling yesterday      PAST MEDICAL HISTORY:   History of colon cancer with colostomy  Parkinson's disease  No active cardiac or renal disease    ALLERGIES:    No Known Allergies     MEDICATIONS ON ADMISSION:  Medications were reviewed.  They do include:   Medications Prior to Admission   Medication Sig Dispense Refill Last Dose     carbidopa-levodopa (SINEMET)  MG tablet Take 1.5 tablets by mouth 3 times daily   7/15/2022 at 1800     famotidine (PEPCID) 20 MG tablet Take 20 mg by mouth 2 times daily   7/15/2022 at Unknown time     latanoprost (XALATAN) 0.005 % ophthalmic solution Place 1 drop into both eyes At Bedtime   7/14/2022 at Unknown time       SOCIAL HISTORY: The patient lives in an independent living apartment with his wife.        FAMILY HISTORY:  History reviewed. No pertinent family history.    REVIEW OF SYSTEMS:   See Admission History and  Physical     PHYSICAL EXAMINATION:    Temp:  [97.5  F (36.4  C)-98.1  F (36.7  C)] 98.1  F (36.7  C)  Pulse:  [60-73] 65  Resp:  [15-28] 18  BP: (119-149)/(56-86) 119/56  SpO2:  [93 %-99 %] 99 %    General: On examination, the patient is alert and oriented to self  Neuro: Patient is alert and interactive  Psych: Normal affect, nonpressured speech.  Respiratory: Breathing unlabored on room air  Cardiovascular: Extremities are warm and well-perfused.  Bilateral upper extremities: Patient exhibits full range of motion of his bilateral shoulders, elbows, wrist, and fingers without pain.  Right lower extremity: No pain with hip logroll or heel strike.  No tenderness to palpation over the femur, knee, leg, ankle, or foot.  The patient wiggles his toes but otherwise does not participate in a motor exam.  He reports sensation is intact light touch throughout the right lower extremity.  Left lower extremity: The patient has pain with hip logroll on the left.  He has no tenderness to palpation over the distal femur, knee, leg, ankle, or foot.  There are a few healing superficial abrasions over the left knee.  The patient wiggles his toes and plantar flexes and dorsiflexes his ankle, but otherwise does not participate in a strength exam.  He reports sensation intact light touch at the DP/SP/tibial/saphenous/sural nerve distributions.  He has a palpable DP pulse, and his foot is warm and well-perfused.    RADIOGRAPHIC EVALUATION:  AP the pelvis with AP and lateral of the left femur are personally reviewed.  There is a displaced subcapital femoral neck fracture with a fracture spike extending distally into the neck.  There is no lytic or's concerning sclerotic lesion.  No fracture is noted about the pelvis.  The right hip is intact.  There is no distal femur fracture, and no concerning lytic lesions more distal in the femur.      LABORATORY DATA:    Hemoglobin 14.5    IMPRESSION:   88-year-old male with Parkinson's and history  of rectal cancer who had a mechanical fall on 7/15/2022 with a displaced left femoral neck fracture.     PLAN:  I discussed with the patient, his daughter, and his wife Betty who is his decision-maker the situation.  The patient is independently ambulatory without assistive device.  We discussed that nonoperative management would entail a prolonged period in bed, likely difficulty with weightbearing and pain, and the potential for complications from prolonged immobilization including pressure sores, UTI, pneumonia.  We discussed the option for operative intervention in the form of a left hip hemiarthroplasty.  We discussed the benefit of surgery is mobilization and weightbearing immediately after surgery.  We discussed the risks of surgery with the risks including but not limited to the risk of anesthesia, heart attack, stroke, blood clot, pneumonia, and death.  We discussed the risks of surgery including but not limited to bleeding, infection, damage to surrounding structures like blood vessels and nerves, wound healing issues, postoperative numbness that may or may not resolve, leg length discrepancy, instability, fracture, need for further surgery.  Ultimately after thorough discussion and using a shared decision-making process, the patient and his family elected to proceed with surgery.  Signed informed consent was obtained and placed in the chart.  The patient has been admitted and optimized/cleared by the hospitalist.  He is n.p.o. and his anticoagulation has been held.  We will proceed to the operating room this morning for a cemented left hip hemiarthroplasty.    Gerardo Matos MD  [facility]      Gerardo Matos MD

## 2022-07-16 NOTE — ED TRIAGE NOTES
Pt was over [facility] urgent care after a mechanical fall around 1820 today. Pt hx of parkinson's. Xray shows hip fracture on the L side, images should be pushed to us.

## 2022-07-16 NOTE — ED NOTES
Patient moved from ER 11 to ER 7 do to patient getting out of bed, pulling off his monitoring equipment, pulling out IV, taking off his diaper. He is cleaned, redressed, has new IV. Sitting across from ED Tech, who can monitor him from open door. VSS. Patient given pain meds.

## 2022-07-16 NOTE — ANESTHESIA PROCEDURE NOTES
Intrathecal injection Procedure Note    Pre-Procedure   Staff -        Anesthesiologist:  Lynne Gould MD       Performed By: anesthesiologist       Location: OR       Procedure Start/Stop Times: 7/16/2022 10:57 AM and 7/16/2022 11:05 AM       Pre-Anesthestic Checklist: patient identified, IV checked, risks and benefits discussed, informed consent, monitors and equipment checked, pre-op evaluation, at physician/surgeon's request and post-op pain management  Timeout:       Correct Patient: Yes        Correct Procedure: Yes        Correct Site: Yes        Correct Position: Yes   Procedure Documentation  Procedure: intrathecal injection       Patient Position: RLD       Patient Prep/Sterile Barriers: sterile gloves, mask, patient draped       Skin prep: Chloraprep       Insertion Site: L2-3. (midline approach).       Needle Gauge: 24.        Needle Length (Inches): 4        Spinal Needle Type: Pencan       Introducer used       Introducer: 20 G       # of attempts: 2 (initial attempt L3-4, dense bony obstruction - easy pass at L2-3) and  # of redirects:  1    Assessment/Narrative         Paresthesias: No.       Sensory Level: T10       CSF fluid: clear.       Opening pressure was cmH2O while  Lateral.      Medication(s) Administered   0.5% Bupivacaine PF (Intrathecal) - Intrathecal   3 mL - 7/16/2022 10:57:00 AM  Medication Administration Time: 7/16/2022 10:57 AM

## 2022-07-17 NOTE — PLAN OF CARE
Problem: Pain Acute  Goal: Acceptable Pain Control and Functional Ability  Outcome: Ongoing, Progressing   Goal Outcome Evaluation:    Problem: Plan of Care - These are the overarching goals to be used throughout the patient stay.    Goal: Plan of Care Review/Shift Note  Description: The Plan of Care Review/Shift note should be completed every shift.  The Outcome Evaluation is a brief statement about your assessment that the patient is improving, declining, or no change.  This information will be displayed automatically on your shift note.  Outcome: Ongoing, Progressing      Patient sleeping intermittently over night. He appears comfortable and denied pain. Scheduled tylenol given and patient repositioned q2 hours. Abductor wedge in place. VSS. Patient remained a 1:1 over night for safety due to pulling at lines. He is pleasant and cooperative.

## 2022-07-17 NOTE — PROGRESS NOTES
Daily Progress Note        CODE STATUS:  Full Code    Date of visit; 07/16/22    Length of stay (  LOS: 1 day  )     Assessment/Plan:  88-year-old male with past medical history of Parkinson's disease was brought to ED for evaluation of left hip pain after mechanical fall, found to have left hip fracture, underwent surgery and admitted for further management    Mechanical fall and sustained displaced left femur neck fracture;  -- On 7/16, s/p left unipolar hip hemiarthroplasty by Dr. Matos  --Postoperative care, DVT prophylaxis per orthopedic team  --Added scheduled Tylenol, try to avoid narcotics given his age and delirium  -- PT OT consulted    Acute blood loss, postoperative;  -- Operative note reported  mL  -- Hemoglobin check pending    Acute metabolic encephalopathy; likely due to narcotic medication, different surroundings  -- Initiated delirium protocol  -- Currently on one-to-one observation    Left upper and lower extremity scattered superficial bruises due to fall;  -- Continue local care and clinical monitoring.    History of colon s/p post surgery and colostomy bag;  -- Patient's daughter reported on remission and taking daily MiraLAX, ordered  -- Continue colostomy bag care    History of parkinsonism;  -- Continue home medications    Leukocytosis on admission; likely stress.  Resolved  -- On admission, no reported recent febrile illness, respiratory/GI/ symptoms.  --Recheck normal    DVT prophylaxis per orthopedic team      Disposition; anticipate TCU  Barrier to discharge; postop recovery      Subjective:  Interval History: Patient seen and examined. Notes, labs, imaging reports personally reviewed.  Overnight issues with patient pulling tubes/lines and placed on one-to-one observation.  During my visit, patient looks comfortable, noticed purposefully moving his upper extremities, pulling blankets, did not follow commands.    Discussed with nursing staffs.  I had detailed conversation with  patient's daughter yesterday.  Addendum;  Went to see patient again this afternoon.  Patient sitting in her chair, smiling, joking.  Discussed with one-to-one staff, reported had good breakfast and now ordering lunch.    Review of Systems:   As mentioned in subjective.    Patient Active Problem List   Diagnosis     Hip fracture, left, closed, initial encounter (H)     Colon cancer metastasized to liver (H)     Parkinson disease (H)       Scheduled Meds:    acetaminophen  975 mg Oral Q8H     carbidopa-levodopa  1 tablet Oral TID    And     carbidopa-levodopa  1 half-tab Oral TID     ceFAZolin  2 g Intravenous See Admin Instructions     famotidine  20 mg Oral Q24H     heparin ANTICOAGULANT  5,000 Units Subcutaneous Q8H     latanoprost  1 drop Both Eyes At Bedtime     melatonin  1 mg Oral At Bedtime     polyethylene glycol  17 g Oral Daily     sodium chloride (PF)  3 mL Intracatheter Q8H     Continuous Infusions:    PRN Meds:.acetaminophen, sore throat lozenge, benztropine, bisacodyl, HYDROmorphone, lidocaine 4%, lidocaine (buffered or not buffered), magnesium hydroxide, naloxone **OR** naloxone **OR** naloxone **OR** naloxone, ondansetron **OR** ondansetron, prochlorperazine **OR** prochlorperazine, QUEtiapine, senna-docusate, sodium chloride (PF), sodium chloride (PF), sodium chloride (PF)    Objective:  Vital signs in last 24 hours:  Temp:  [97.7  F (36.5  C)-99.4  F (37.4  C)] 98.2  F (36.8  C)  Pulse:  [54-84] 66  Resp:  [13-18] 18  BP: ()/(50-92) 122/59  Cuff Mean (mmHg):  [65] 65  SpO2:  [92 %-100 %] 95 %      Intake/Output Summary (Last 24 hours) at 7/16/2022 1506  Last data filed at 7/16/2022 1314  Gross per 24 hour   Intake 703 ml   Output 700 ml   Net 3 ml       Physical Exam:  General: Not in obvious distress.  HEENT: Normocephalic, supple neck  Chest: Clear to auscultation bilateral anteriorly, no wheezing  Heart: S1S2 normal, regular  Abdomen: Soft.  No grimace appreciated.  Colostomy bag in place.   Kevin catheter in place  Extremities: No legs swelling  Neuro: Awake, not following commands, moving upper extremities spontaneously and purposefully, moving lower extremities with trickling.  Has baseline upper extremity tremor due to underlying Parkinson's      Lab Results:(I have personally reviewed the results)    Recent Results (from the past 24 hour(s))   Basic metabolic panel    Collection Time: 07/16/22 10:40 AM   Result Value Ref Range    Sodium 141 136 - 145 mmol/L    Potassium 3.8 3.5 - 5.0 mmol/L    Chloride 105 98 - 107 mmol/L    Carbon Dioxide (CO2) 28 22 - 31 mmol/L    Anion Gap 8 5 - 18 mmol/L    Urea Nitrogen 26 8 - 28 mg/dL    Creatinine 0.98 0.70 - 1.30 mg/dL    Calcium 8.6 8.5 - 10.5 mg/dL    Glucose 110 70 - 125 mg/dL    GFR Estimate 74 >60 mL/min/1.73m2   CBC with platelets and differential    Collection Time: 07/16/22 10:40 AM   Result Value Ref Range    WBC Count 8.4 4.0 - 11.0 10e3/uL    RBC Count 4.30 (L) 4.40 - 5.90 10e6/uL    Hemoglobin 13.3 13.3 - 17.7 g/dL    Hematocrit 40.1 40.0 - 53.0 %    MCV 93 78 - 100 fL    MCH 30.9 26.5 - 33.0 pg    MCHC 33.2 31.5 - 36.5 g/dL    RDW 12.8 10.0 - 15.0 %    Platelet Count 154 150 - 450 10e3/uL    % Neutrophils 82 %    % Lymphocytes 9 %    % Monocytes 7 %    % Eosinophils 1 %    % Basophils 0 %    % Immature Granulocytes 1 %    NRBCs per 100 WBC 0 <1 /100    Absolute Neutrophils 6.9 1.6 - 8.3 10e3/uL    Absolute Lymphocytes 0.8 0.8 - 5.3 10e3/uL    Absolute Monocytes 0.6 0.0 - 1.3 10e3/uL    Absolute Eosinophils 0.1 0.0 - 0.7 10e3/uL    Absolute Basophils 0.0 0.0 - 0.2 10e3/uL    Absolute Immature Granulocytes 0.0 <=0.4 10e3/uL    Absolute NRBCs 0.0 10e3/uL   Magnesium    Collection Time: 07/16/22 10:40 AM   Result Value Ref Range    Magnesium 2.2 1.8 - 2.6 mg/dL         Serum Glucose range:   Recent Labs   Lab 07/16/22  1040 07/15/22  2132    103     ABG: No lab results found in last 7 days.  CBC:   Recent Labs   Lab 07/16/22  1040  07/15/22  2132   WBC 8.4 14.8*   HGB 13.3 14.5   HCT 40.1 43.8   MCV 93 94    193   NEUTROPHIL 82 87   LYMPH 9 6   MONOCYTE 7 6   EOSINOPHIL 1 0     Chemistry:   Recent Labs   Lab 07/16/22  1040 07/15/22  2132    143   POTASSIUM 3.8 4.2   CHLORIDE 105 105   CO2 28 27   BUN 26 33*   CR 0.98 1.26   GFRESTIMATED 74 55*   SELINA 8.6 9.4   MAG 2.2  --    PROTTOTAL  --  7.1   ALBUMIN  --  3.8   AST  --  28   ALT  --  <9   ALKPHOS  --  96   BILITOTAL  --  1.5*     Coags:  No results for input(s): INR, PROTIME, PTT in the last 168 hours.    Invalid input(s): APTT  Cardiac Markers:  No results for input(s): CKTOTAL, TROPONINI in the last 168 hours.     XR Pelvis w Hip Port Left  1 View    Result Date: 7/16/2022  EXAM: XR PELVIS AND HIP PORTABLE LEFT 1 VIEW LOCATION: [facility] DATE/TIME: 7/16/2022 1:56 PM INDICATION: Status post Hip surgery COMPARISON: 07/16/2022.     IMPRESSION: Left femoral fracture has been treated with a hemiarthroplasty and hip implant. Bones are demineralized. No dislocation.    XR Femur Left 2 Views    Result Date: 7/16/2022  EXAM: XR PELVIS 1/2 VW, XR FEMUR LEFT 2 VIEW LOCATION: [facility] DATE/TIME: 7/16/2022 8:27 AM INDICATION: Left hip pain. COMPARISON: None.     IMPRESSION: Acute mildly impacted nondisplaced subcapital fracture of the left femoral neck. No acute fracture in the pelvis or hips otherwise. Generalized demineralization. No concerning bone lesion. Normal alignment of the hip joints with minimal degenerative arthritic changes. Intact pelvic ring. Small radiodense foreign body projects over the anterior aspect of the left pelvis/hip. Moderate advanced degenerative changes in the lower lumbar spine. The remainder of the left femur is normal. No other fracture or bone lesion. Normal knee joint alignment. No significant joint effusion. Minimal degenerative changes in the lateral compartment.    XR Pelvis 1/2 Views    Result Date:  7/16/2022  EXAM: XR PELVIS 1/2 VW, XR FEMUR LEFT 2 VIEW LOCATION: [facility] DATE/TIME: 7/16/2022 8:27 AM INDICATION: Left hip pain. COMPARISON: None.     IMPRESSION: Acute mildly impacted nondisplaced subcapital fracture of the left femoral neck. No acute fracture in the pelvis or hips otherwise. Generalized demineralization. No concerning bone lesion. Normal alignment of the hip joints with minimal degenerative arthritic changes. Intact pelvic ring. Small radiodense foreign body projects over the anterior aspect of the left pelvis/hip. Moderate advanced degenerative changes in the lower lumbar spine. The remainder of the left femur is normal. No other fracture or bone lesion. Normal knee joint alignment. No significant joint effusion. Minimal degenerative changes in the lateral compartment.    Latest radiology report personally reviewed.    Note created using dragon voice recognition software so sounds alike errors may have escaped editing.    07/17/2022   JENNIFER DAVID MD  HOSPITALIST, [facility]  PAGER NO. [phone]

## 2022-07-17 NOTE — PROGRESS NOTES
"Assessment: 1 Day Post-Op  S/P Procedure(s):  HEMIARTHROPLASTY, HIP, BIPOLAR     Plan:   - Continue PT/OT  - Weightbearing status: WBAT posterior hip precautions  - Anticoagulation: Reviewed with Dr. Matos, no obvious cardiac issues in the chart, will switch to 325mg ASA for DVT prophylaxis in place of heparin. in addition to SCDs, angeles stockings and early ambulation.  - Discharge planning: home vs TCU      Subjective:  Currently on 1:1 with aide in the room at bedside  Resting comfortably does not appear to be in pain.  Tolerated exam without visible pain cues      Objective:  /59 (BP Location: Left arm)   Pulse 66   Temp 98.2  F (36.8  C) (Oral)   Resp 18   Ht 1.727 m (5' 8\")   Wt 62.1 kg (137 lb)   SpO2 95%   BMI 20.83 kg/m    The patient is A&Ox3. Appears comfortable.   Sensation is intact.  Dorsiflexion and plantar flexion is intact.  Dorsalis pedis pulse intact.  Calves are soft and non-tender. Negative Noam's.  The incision is covered. Dressing C/D/I.    No drain     Pertinent Labs   Lab Results: personally reviewed.   No results found for: INR, PROTIME  Lab Results   Component Value Date    WBC 8.4 07/16/2022    HGB 13.3 07/16/2022    HCT 40.1 07/16/2022    MCV 93 07/16/2022     07/16/2022     Lab Results   Component Value Date     07/16/2022    CO2 28 07/16/2022         Report completed by:  Lalitha Hargrove PA-C, ERLIN  Date: 7/17/2022  Time: 7:44 AM    "

## 2022-07-17 NOTE — PROGRESS NOTES
Occupational Therapy      07/17/22 1130   Quick Adds   Type of Visit Initial Occupational Therapy Evaluation   Living Environment   People in Home facility resident   Current Living Arrangements independent living facility   Home Accessibility no concerns   Transportation Anticipated family or friend will provide   Living Environment Comments Pt reports living w/ spouse- per chart review he lives in [facility] spouse is primary caregiver   Self-Care   Equipment Currently Used at Home none   Fall history within last six months yes   Number of times patient has fallen within last six months 1   Activity/Exercise/Self-Care Comment Pt spouse assists w/ ADL routine   General Information   Onset of Illness/Injury or Date of Surgery 07/15/22   Referring Physician Talha HORNER MD   Additional Occupational Profile Info/Pertinent History of Current Problem PMHx. dementia, parkinsons disease presenting to ED for eval for L. hip fx s/p L. unipolar Hip hemiarthroplasty   Existing Precautions/Restrictions fall;no hip IR;no hip ADD past midline;90 degree hip flexion   Left Lower Extremity (Weight-bearing Status) weight-bearing as tolerated (WBAT)   Cognitive Status Examination   Orientation Status person   Affect/Mental Status (Cognitive) confused   Follows Commands physical/tactile prompts required;repetition of directions required;initiation impaired   Range of Motion Comprehensive   General Range of Motion no range of motion deficits identified   Strength Comprehensive (MMT)   General Manual Muscle Testing (MMT) Assessment no strength deficits identified   Transfers   Transfers sit-stand transfer   Sit-Stand Transfer   Sit-Stand [place] (Transfers) contact guard;verbal cues;supervision;minimum assist (75% patient effort)   Assistive Device (Sit-Stand Transfers) walker, front-wheeled   Activities of Daily Living   BADL Assessment/Intervention upper body dressing;lower body dressing;grooming   Upper Body Dressing Assessment/Training    Position (Upper Body Dressing) unsupported sitting   [place] Level (Upper Body Dressing) minimum assist (75% patient effort);verbal cues   Clinical Impression   Criteria for Skilled Therapeutic Interventions Met (OT) Yes, treatment indicated   OT Diagnosis Decreased ADL ind.,   OT Problem List-Impairments impacting ADL problems related to;activity tolerance impaired;cognition;balance;post-surgical precautions   Assessment of Occupational Performance 1-3 Performance Deficits   Identified Performance Deficits LB dressing w/ AE, trnf/mobility, safety awareness   Planned Therapy Interventions (OT) ADL retraining;cognition;transfer training   Clinical Decision Making Complexity (OT) low complexity   Risk & Benefits of therapy have been explained evaluation/treatment results reviewed;patient   OT Discharge Planning   OT Discharge Recommendation (DC Rec) Transitional Care Facility   OT Rationale for DC Rec Assist for trnf/mobility at this time d/t surgical precautions w/ LLE. OT recommending n24 hour supervision/assist to ensure safety at d/c   Total Evaluation Time (Minutes)   Total Evaluation Time (Minutes) 8   OT Goals   Therapy Frequency (OT) Daily   OT Predicted Duration/Target Date for Goal Attainment 07/25/22   OT Goals Lower Body Dressing;Bed Mobility;Transfers;Toilet Transfer/Toileting;Cognition   OT: Lower Body Dressing Supervision/stand-by assist;using adaptive equipment;within precautions   OT: Bed Mobility Supervision/stand-by assist;supine to/from sitting;within precautions   OT: Transfer Supervision/stand-by assist;within precautions;with assistive device   OT: Toilet Transfer/Toileting Supervision/stand-by assist;using adaptive equipment;within precautions   OT: Cognitive Patient/caregiver will verbalize understanding of cognitive assessment results/recommendations as needed for safe discharge planning

## 2022-07-17 NOTE — PROGRESS NOTES
07/17/22 1000   Quick Adds   Type of Visit Initial PT Evaluation   Living Environment   Living Environment Comments Home hx unknown. Pt unable to answer questions.   Self-Care   Equipment Currently Used at Home none   General Information   Onset of Illness/Injury or Date of Surgery 07/15/22   Referring Physician Gerardo Matos MD   Patient/Family Therapy Goals Statement (PT) none   Pertinent History of Current Problem (include personal factors and/or comorbidities that impact the POC) fall, L hip fx, s/p hemiarthropasty   Existing Precautions/Restrictions (S)  fall;no hip IR;no hip ADD past midline;90 degree hip flexion  (posterior hip precautions)   Cognition   Affect/Mental Status (Cognition) confused   Strength (Manual Muscle Testing)   Strength (Manual Muscle Testing) Deficits observed during functional mobility   Bed Mobility   Bed Mobility supine-sit   Supine-Sit [place] (Bed Mobility) moderate assist (50% patient effort)   Bed Mobility Limitations cognitive deficits;decreased ability to use legs for bridging/pushing;impaired ability to control trunk for mobility   Transfers   Transfers sit-stand transfer   Sit-Stand Transfer   Sit-Stand [place] (Transfers) minimum assist (75% patient effort);2 person assist   Assistive Device (Sit-Stand Transfers) walker, front-wheeled   Gait/Stairs (Locomotion)   [place] Level (Gait) minimum assist (75% patient effort);2 person assist   Assistive Device (Gait) walker, front-wheeled   Distance in Feet (Required for LE Total Joints) 5'  (bed > chair)   Pattern (Gait) step-to   Deviations/Abnormal Patterns (Gait) festinating/shuffling;gait speed decreased;antalgic   Clinical Impression   Criteria for Skilled Therapeutic Intervention Yes, treatment indicated   PT Diagnosis (PT) Impaired functional mobility   Influenced by the following impairments Weakness, cognition, post-op   Functional limitations due to impairments Impaired strength, impaired  transfers, impaired gait   Clinical Presentation (PT Evaluation Complexity) Stable/Uncomplicated   Clinical Presentation Rationale Presents as diagnosed   Clinical Decision Making (Complexity) moderate complexity   Planned Therapy Interventions (PT) bed mobility training;gait training;home exercise program;strengthening;transfer training   Anticipated Equipment Needs at Discharge (PT) walker, rolling   Risk & Benefits of therapy have been explained patient   PT Discharge Planning   PT Discharge Recommendation (DC Rec) Transitional Care Facility   PT Rationale for DC Rec Ax2 with mobiltiy today.   Total Evaluation Time   Total Evaluation Time (Minutes) 10   Physical Therapy Goals   PT Frequency 2x/day   PT Predicted Duration/Target Date for Goal Attainment 07/24/22   PT Goals Bed Mobility;Transfers;Gait   PT: Bed Mobility Minimal assist;Supine to/from sit;Within precautions   PT: Transfers Minimal assist;Sit to/from stand;Bed to/from chair;Assistive device;Within precautions   PT: Gait Minimal assist;Rolling walker;Within precautions;150 feet       Jazlyn Joshua, PT, DPT  7/17/2022

## 2022-07-18 NOTE — PLAN OF CARE
Problem: Risk for Delirium  Goal: Optimal Coping  Outcome: Ongoing, Progressing     Problem: Plan of Care - These are the overarching goals to be used throughout the patient stay.    Goal: Plan of Care Review/Shift Note  Description: The Plan of Care Review/Shift note should be completed every shift.  The Outcome Evaluation is a brief statement about your assessment that the patient is improving, declining, or no change.  This information will be displayed automatically on your shift note.  Outcome: Ongoing, Progressing     Problem: Behavior Regulation Impairment (Dementia Signs/Symptoms)  Goal: Improved Behavioral Control (Dementia Signs/Symptoms)  Outcome: Ongoing, Progressing     Problem: Plan of Care - These are the overarching goals to be used throughout the patient stay.    Goal: Absence of Hospital-Acquired Illness or Injury  Intervention: Identify and Manage Fall Risk  Recent Flowsheet Documentation  Taken 7/17/2022 1640 by Meghan Salguero RN  Safety Promotion/Fall Prevention: bed alarm on  Intervention: Prevent Skin Injury  Recent Flowsheet Documentation  Taken 7/17/2022 2009 by Meghan Salguero RN  Body Position:   turned   right   log-rolled  Taken 7/17/2022 1800 by Meghan Salguero RN  Body Position:   turned   left   log-rolled  Intervention: Prevent and Manage VTE (Venous Thromboembolism) Risk  Recent Flowsheet Documentation  Taken 7/17/2022 1640 by Meghan Salguero RN  VTE Prevention/Management: SCDs (sequential compression devices) on  Activity Management: activity adjusted per tolerance   Goal Outcome Evaluation:      Patient alert and pleasantly confused. Calm no behavior noted this shift. Vitals stable. Left hip surgical dressing CDI. CMS intact. Turned and repositioned every 2 hours. Pt on schedule Tylenol for pain. Ate 100% for dinner. Will continue to monitor.

## 2022-07-18 NOTE — PLAN OF CARE
Problem: Pain Acute  Goal: Acceptable Pain Control and Functional Ability  Outcome: Met     Problem: Behavior Regulation Impairment (Dementia Signs/Symptoms)  Goal: Improved Behavioral Control (Dementia Signs/Symptoms)  Outcome: Met     Problem: Mood Impairment (Dementia Signs/Symptoms)  Goal: Improved Mood Symptoms (Dementia Signs/Symptoms)  Outcome: Met   Goal Outcome Evaluation:      Pt. Slept in between cares. VSS. No complaints of pain at rest. Left hip dressing clean, dry, intact. Anticipate TCU at discharge. Will continue to monitor.    Mireille Garcia RN

## 2022-07-18 NOTE — PLAN OF CARE
Goal Outcome Evaluation:    Plan of Care Reviewed With: patient        Problem: Pain Acute  Goal: Acceptable Pain Control and Functional Ability  Outcome: Ongoing, Progressing   As needed iv dilaudid given for left hip pain, when his leg was straightened out, he grabbed his left hip and grimaced.     Problem: Behavior Regulation Impairment (Dementia Signs/Symptoms)  Goal: Improved Behavioral Control (Dementia Signs/Symptoms)  Outcome: Ongoing, Progressing   Pt. Continually trying to get up and out of bed. We tried standing him up and going into the chair and he kept trying to get up out of chair as well. Pt. Difficult to re-direct. Dr. DAVID text paged, as needed Seroquel changed to every 6 hrs as needed. 1:1 attendant now in room for patient safety due to impulsiveness.     Regina Mckeon RN

## 2022-07-18 NOTE — PROGRESS NOTES
"Orthopedic Progress Note      Assessment: 2 Days Post-Op  S/P Procedure(s):  HEMIARTHROPLASTY, HIP, BIPOLAR     Plan:   - Continue PT/OT  - Weightbearing status: WBAT , posterior hip precautions   - Pain Management   - Anticoagulation:  PO BID in addition to SCDs, angeles stockings and early ambulation.  - Discharge planning: likely TCU pending PT/OT progression, pain control, medical clearance       Subjective:  Pain: minimal  Nausea, Vomiting:  No  Lightheadedness, Dizziness:  No  Neuro:  Patient denies new onset numbness or paresthesias    Patient reports feeling well. Patient reports working well with therapy. Patient sitting on the recliner comfortably and does not appear to be in pain. Tolerated exam without visible pain cues.     Objective:  BP (!) 150/81 (BP Location: Left arm)   Pulse 74   Temp 98.1  F (36.7  C) (Oral)   Resp 16   Ht 1.727 m (5' 8\")   Wt 62.1 kg (137 lb)   SpO2 96%   BMI 20.83 kg/m    The patient is A&Ox3. Appears comfortable.   Sensation is intact.  Dorsiflexion and plantar flexion is intact. Wiggle toes intact  Dorsalis pedis pulse intact.  Calves are soft and non-tender. Negative Noam's.  The incision is covered. Dressing C/D/I.    No drain     Pertinent Labs   Lab Results: personally reviewed.   No results found for: INR, PROTIME  Lab Results   Component Value Date    WBC 8.4 07/16/2022    HGB 11.5 (L) 07/18/2022    HCT 40.1 07/16/2022    MCV 93 07/16/2022     07/16/2022     Lab Results   Component Value Date     07/18/2022    CO2 25 07/18/2022         Report completed by:  Zoraida Dela Cruz PA-C, ERLIN  Date: 7/18/2022  Time: 10:24 AM    "

## 2022-07-18 NOTE — PLAN OF CARE
Problem: Plan of Care - These are the overarching goals to be used throughout the patient stay.    Goal: Absence of Hospital-Acquired Illness or Injury  Intervention: Identify and Manage Fall Risk  Recent Flowsheet Documentation  Taken 7/18/2022 0850 by Kristopher Skelton RN  Safety Promotion/Fall Prevention:    activity supervised    assistive device/personal items within reach    bed alarm on    fall prevention program maintained    nonskid shoes/slippers when out of bed    patient and family education     Problem: Plan of Care - These are the overarching goals to be used throughout the patient stay.    Goal: Absence of Hospital-Acquired Illness or Injury  Intervention: Prevent Skin Injury  Recent Flowsheet Documentation  Taken 7/18/2022 0850 by Kristopher Skelton RN  Body Position: supine, head elevated     Problem: Plan of Care - These are the overarching goals to be used throughout the patient stay.    Goal: Optimal Comfort and Wellbeing  Outcome: Ongoing, Progressing   Goal Outcome Evaluation:      Patient is only alert to self. Turned and reposition patient throughout day. Patient was up to chair for breakfast and lunch. Fall precautions in place.

## 2022-07-18 NOTE — PROGRESS NOTES
Daily Progress Note        CODE STATUS:  Full Code    Date of visit; 07/16/22    Length of stay (  LOS: 1 day  )     Assessment/Plan:  88-year-old male with past medical history of Parkinson's disease was brought to ED for evaluation of left hip pain after mechanical fall, found to have left hip fracture, underwent surgery and admitted for further management    Mechanical fall and sustained displaced left femur neck fracture;  -- On 7/16, s/p left unipolar hip hemiarthroplasty by Dr. Matos  --Postoperative care, DVT prophylaxis per orthopedic team  --Added scheduled Tylenol, try to avoid narcotics given his age and delirium  -- PT OT    Acute blood loss, postoperative;  -- Operative note reported  mL  -- Hemoglobin fairly stable.  Hemoglobin 11.5 today    Acute metabolic encephalopathy; improving.  Likely due to narcotic medication, different surroundings  -- Initiated delirium protocol  -- Continue clinical monitoring    Left upper and lower extremity scattered superficial bruises due to fall;  -- Continue local care and clinical monitoring.    History of colon s/p post surgery and colostomy bag;  -- Patient's daughter reported on remission and taking daily MiraLAX, ordered.  -- Continue colostomy bag care    History of parkinsonism;  -- Continue home medications    Leukocytosis on admission; likely stress.  Resolved  -- On admission, no reported recent febrile illness, respiratory/GI/ symptoms.      DVT prophylaxis; per orthopedic team, they started patient on twice daily aspirin.    GI prophylaxis; continue home famotidine twice daily for GI prophylaxis while on twice daily aspirin      Disposition; anticipate TCU  Barrier to discharge; postop recovery      Subjective:  Interval History: Patient seen and examined. Notes, labs, imaging reports personally reviewed.  No acute issues reported overnight.  Patient alert and awake, follows simple commands, reports feeling hungry.  Denied pain or shortness of  breath.    Discussed with nursing staffs and requested to discuss with orthopedic team for Kevin catheter removal  Called patient's daughter and left message.    Review of Systems:   As mentioned in subjective.    Patient Active Problem List   Diagnosis     Hip fracture, left, closed, initial encounter (H)     Colon cancer metastasized to liver (H)     Parkinson disease (H)       Scheduled Meds:    acetaminophen  975 mg Oral Q8H     aspirin  325 mg Oral BID     carbidopa-levodopa  1 tablet Oral TID    And     carbidopa-levodopa  1 half-tab Oral TID     ceFAZolin  2 g Intravenous See Admin Instructions     famotidine  20 mg Oral BID     latanoprost  1 drop Both Eyes At Bedtime     melatonin  1 mg Oral At Bedtime     polyethylene glycol  17 g Oral Daily     sodium chloride (PF)  3 mL Intracatheter Q8H     Continuous Infusions:    PRN Meds:.acetaminophen, sore throat lozenge, benztropine, bisacodyl, HYDROmorphone, lidocaine 4%, lidocaine (buffered or not buffered), magnesium hydroxide, naloxone **OR** naloxone **OR** naloxone **OR** naloxone, ondansetron **OR** ondansetron, prochlorperazine **OR** prochlorperazine, QUEtiapine, senna-docusate, sodium chloride (PF), sodium chloride (PF), sodium chloride (PF)    Objective:  Vital signs in last 24 hours:  Temp:  [97.4  F (36.3  C)-98  F (36.7  C)] 97.7  F (36.5  C)  Pulse:  [63-78] 63  Resp:  [16-18] 16  BP: ()/(52-64) 105/55  SpO2:  [91 %-98 %] 95 %      Intake/Output Summary (Last 24 hours) at 7/16/2022 1506  Last data filed at 7/16/2022 1314  Gross per 24 hour   Intake 703 ml   Output 700 ml   Net 3 ml       Physical Exam:  General: Not in obvious distress.  HEENT: Normocephalic, supple neck  Chest: Clear to auscultation bilateral anteriorly, no wheezing  Heart: S1S2 normal, regular  Abdomen: Soft.  No tenderness.  Colostomy bag in place.  Kevin catheter in place  Extremities: No legs swelling.  Skin; scattered superficial bruises and abrasion on  extremities  Neuro: Awake, following simple commands, moving all extremities, has baseline upper extremity tremor due to underlying Parkinson.      Lab Results:(I have personally reviewed the results)    Recent Results (from the past 24 hour(s))   Glucose by meter    Collection Time: 07/17/22 11:37 AM   Result Value Ref Range    GLUCOSE BY METER POCT 164 (H) 70 - 99 mg/dL   Hemoglobin    Collection Time: 07/17/22  3:06 PM   Result Value Ref Range    Hemoglobin 12.0 (L) 13.3 - 17.7 g/dL   Glucose by meter    Collection Time: 07/17/22  4:55 PM   Result Value Ref Range    GLUCOSE BY METER POCT 115 (H) 70 - 99 mg/dL   Hemoglobin    Collection Time: 07/18/22  6:05 AM   Result Value Ref Range    Hemoglobin 11.5 (L) 13.3 - 17.7 g/dL   Extra Green Top (Lithium Heparin) Tube    Collection Time: 07/18/22  6:05 AM   Result Value Ref Range    Hold Specimen     Basic metabolic panel    Collection Time: 07/18/22  6:05 AM   Result Value Ref Range    Sodium 139 136 - 145 mmol/L    Potassium 3.9 3.5 - 5.0 mmol/L    Chloride 107 98 - 107 mmol/L    Carbon Dioxide (CO2) 25 22 - 31 mmol/L    Anion Gap 7 5 - 18 mmol/L    Urea Nitrogen 23 8 - 28 mg/dL    Creatinine 0.82 0.70 - 1.30 mg/dL    Calcium 8.3 (L) 8.5 - 10.5 mg/dL    Glucose 104 70 - 125 mg/dL    GFR Estimate 84 >60 mL/min/1.73m2         Serum Glucose range:   Recent Labs   Lab 07/18/22  0605 07/17/22  1655 07/17/22  1137 07/16/22  1040    115* 164* 110     ABG: No lab results found in last 7 days.  CBC:   Recent Labs   Lab 07/18/22  0605 07/17/22  1506 07/16/22  1040 07/15/22  2132   WBC  --   --  8.4 14.8*   HGB 11.5* 12.0* 13.3 14.5   HCT  --   --  40.1 43.8   MCV  --   --  93 94   PLT  --   --  154 193   NEUTROPHIL  --   --  82 87   LYMPH  --   --  9 6   MONOCYTE  --   --  7 6   EOSINOPHIL  --   --  1 0     Chemistry:   Recent Labs   Lab 07/18/22  0605 07/16/22  1040 07/15/22  2132    141 143   POTASSIUM 3.9 3.8 4.2   CHLORIDE 107 105 105   CO2 25 28 27   BUN 23  26 33*   CR 0.82 0.98 1.26   GFRESTIMATED 84 74 55*   SELINA 8.3* 8.6 9.4   MAG  --  2.2  --    PROTTOTAL  --   --  7.1   ALBUMIN  --   --  3.8   AST  --   --  28   ALT  --   --  <9   ALKPHOS  --   --  96   BILITOTAL  --   --  1.5*     Coags:  No results for input(s): INR, PROTIME, PTT in the last 168 hours.    Invalid input(s): APTT  Cardiac Markers:  No results for input(s): CKTOTAL, TROPONINI in the last 168 hours.     XR Pelvis w Hip Port Left  1 View    Result Date: 7/16/2022  EXAM: XR PELVIS AND HIP PORTABLE LEFT 1 VIEW LOCATION: [facility] DATE/TIME: 7/16/2022 1:56 PM INDICATION: Status post Hip surgery COMPARISON: 07/16/2022.     IMPRESSION: Left femoral fracture has been treated with a hemiarthroplasty and hip implant. Bones are demineralized. No dislocation.    XR Femur Left 2 Views    Result Date: 7/16/2022  EXAM: XR PELVIS 1/2 VW, XR FEMUR LEFT 2 VIEW LOCATION: [facility] DATE/TIME: 7/16/2022 8:27 AM INDICATION: Left hip pain. COMPARISON: None.     IMPRESSION: Acute mildly impacted nondisplaced subcapital fracture of the left femoral neck. No acute fracture in the pelvis or hips otherwise. Generalized demineralization. No concerning bone lesion. Normal alignment of the hip joints with minimal degenerative arthritic changes. Intact pelvic ring. Small radiodense foreign body projects over the anterior aspect of the left pelvis/hip. Moderate advanced degenerative changes in the lower lumbar spine. The remainder of the left femur is normal. No other fracture or bone lesion. Normal knee joint alignment. No significant joint effusion. Minimal degenerative changes in the lateral compartment.    XR Pelvis 1/2 Views    Result Date: 7/16/2022  EXAM: XR PELVIS 1/2 VW, XR FEMUR LEFT 2 VIEW LOCATION: [facility] DATE/TIME: 7/16/2022 8:27 AM INDICATION: Left hip pain. COMPARISON: None.     IMPRESSION: Acute mildly impacted nondisplaced subcapital  fracture of the left femoral neck. No acute fracture in the pelvis or hips otherwise. Generalized demineralization. No concerning bone lesion. Normal alignment of the hip joints with minimal degenerative arthritic changes. Intact pelvic ring. Small radiodense foreign body projects over the anterior aspect of the left pelvis/hip. Moderate advanced degenerative changes in the lower lumbar spine. The remainder of the left femur is normal. No other fracture or bone lesion. Normal knee joint alignment. No significant joint effusion. Minimal degenerative changes in the lateral compartment.    Latest radiology report personally reviewed.    Note created using dragon voice recognition software so sounds alike errors may have escaped editing.    07/18/2022   JENNIFER DAVID MD  HOSPITALIST, [facility]  PAGER NO. [phone]

## 2022-07-19 NOTE — PLAN OF CARE
Problem: Risk for Delirium  Goal: Improved Behavioral Control  Outcome: Ongoing, Not Progressing   Goal Outcome Evaluation:    Patient continues to be confused and agitated. Attempted to kick and bite staff twice. Using left hip often and will not follow safe directions on ambulation. Very restless. If continues, may be worth reconsidering medications for restlessness/agitation.

## 2022-07-19 NOTE — PROVIDER NOTIFICATION
MAGALY Mcfarland. PT is agitated. Hitting, kicking, and attempting to bite staff. Can I get IV or IM Zyprexa or Seroquel? Pt spits out oral medication. Thank you. Lindsey KIRKLAND [id number]    Alden Escamilla notified    5 mg IM Zyprexa ordered.

## 2022-07-19 NOTE — CONSULTS
"  INITIAL PSYCHIATRIC CONSULTATION                  REASON FOR REQUEST: 87 y/o man with Parkinson, hip fx, now delirium, some behaviors, biting , hitting here      ASSESSMENT/RECOMMENDATIONS/PLAN :     Metabolic encephalopathy with behavioral dysregulation due to medical condition.  Cognitive and behavioral changes due to above.  Restlessness, impulsivity, agitation: Attempting to hit and bite staff.  Sleep dysregulation.  Sundowning.    Recommendations:  Seroquel 12.5 mg 3 times a day as needed.  Efforts at sleep regulation.  Delirium prevention protocol.  Consider melatonin 3 mg at bedtime.  Monitor for sedation, reassure, reorient frequently.  Address pain.  Add Seroquel 12.5 mg at bedtime.  Seroquel 6.25 mg before supper.      MENTAL STATUS EXAMINATION:   Appearance: Stated age, Awake, arousable, fluctuating levels of consciousness.  Speech: Minimal to none difficulty waking up  Thought Process: Disorganized,   Thought content: Does not appear to respond to internally generated stimuli, no acute visual or auditory hallucination;     No manic symptoms, no paranoia no delusional thoughts.  Thought Formation:  loosening of association   Judgment: Impaired  Insight : Impaired  Attention : Sleeping  Memory: Depressed  Fund Of Knowledge: Depressed  Affect: Neutral  Mood: Congruent  Alert and Oriented: Fluctuating. O x 0-1  Comprehension: Depressed   Generative thought content: Reduced  Language: Intact  Gait and Ambulation: With assist of one.  Problem solving: Impaired.   Cognitive set Shifting: Impaired         BP 97/56 (BP Location: Left arm, Patient Position: Semi-Victoria's, Cuff Size: Adult Small)   Pulse 77   Temp 97.8  F (36.6  C) (Axillary)   Resp 22   Ht 1.727 m (5' 8\")   Wt 62.1 kg (137 lb)   SpO2 93%   BMI 20.83 kg/m    :     HISTORY OF PRESENT ILLNESS:   Presenting history to include[place] [facility] [place]   Chief Complaint:  Fall tonight with hip pain  HPI:  Kwan Yi is a 88 year old old " "male who sustained a mechanical fall while walking today.  The fall was witnessed by family members.  No loss of consciousness.  He hit his left side on the ground.  Able to get up after fall but had pain in the left hip.  No headaches or chest pain prior, no breathing difficulty, no other systemic symptoms.  He went to urgent care where imaging showed hip fracture     Upon assessment patient was noted to be sleeping soundly, difficult to arouse for conversation.  History was obtained from reviewing records.  Bedside RN mention that he was awake and had a small breakfast but after that fell asleep.  He fluctuates in regards to his behavior and ability to follow directions.  Patient had a fall witnessed by family members, no loss of consciousness and had pain in the left hip.  He was brought to hospital for further assessment.  He was found to have a left hip fracture.  History is significant for Parkinson's disease, currently stable on home medication  Reviewed nursing notes.  Patient has intermittent behavioral restlessness, agitation, difficulty sleeping.  He did not sleep at all last night.  Reviewed home medications.  Patient is not on any antidepressant or anxiolytic at home.   Per chart review does not appear to have history of premorbid psychiatric illness.  Review of Systems:As per HPI. Remainders of 12 point review of systems negative.  Psychiatric ROS:  Patient is not a reliable historian at present due to poor cognition.     :         PFSH reviewed  and not pertinent to chief complaint/reason for visit  BP 97/56 (BP Location: Left arm, Patient Position: Semi-Victoria's, Cuff Size: Adult Small)   Pulse 77   Temp 97.8  F (36.6  C) (Axillary)   Resp 22   Ht 1.727 m (5' 8\")   Wt 62.1 kg (137 lb)   SpO2 93%   BMI 20.83 kg/m    No results found for: AMPHET, PCP, BARBIT, OXYCODONE, THC, ETOH  @24HOURRESULTS@  Recent Results (from the past 72 hour(s))   Basic metabolic panel    Collection Time: 07/16/22 " 10:40 AM   Result Value Ref Range    Sodium 141 136 - 145 mmol/L    Potassium 3.8 3.5 - 5.0 mmol/L    Chloride 105 98 - 107 mmol/L    Carbon Dioxide (CO2) 28 22 - 31 mmol/L    Anion Gap 8 5 - 18 mmol/L    Urea Nitrogen 26 8 - 28 mg/dL    Creatinine 0.98 0.70 - 1.30 mg/dL    Calcium 8.6 8.5 - 10.5 mg/dL    Glucose 110 70 - 125 mg/dL    GFR Estimate 74 >60 mL/min/1.73m2   CBC with platelets and differential    Collection Time: 07/16/22 10:40 AM   Result Value Ref Range    WBC Count 8.4 4.0 - 11.0 10e3/uL    RBC Count 4.30 (L) 4.40 - 5.90 10e6/uL    Hemoglobin 13.3 13.3 - 17.7 g/dL    Hematocrit 40.1 40.0 - 53.0 %    MCV 93 78 - 100 fL    MCH 30.9 26.5 - 33.0 pg    MCHC 33.2 31.5 - 36.5 g/dL    RDW 12.8 10.0 - 15.0 %    Platelet Count 154 150 - 450 10e3/uL    % Neutrophils 82 %    % Lymphocytes 9 %    % Monocytes 7 %    % Eosinophils 1 %    % Basophils 0 %    % Immature Granulocytes 1 %    NRBCs per 100 WBC 0 <1 /100    Absolute Neutrophils 6.9 1.6 - 8.3 10e3/uL    Absolute Lymphocytes 0.8 0.8 - 5.3 10e3/uL    Absolute Monocytes 0.6 0.0 - 1.3 10e3/uL    Absolute Eosinophils 0.1 0.0 - 0.7 10e3/uL    Absolute Basophils 0.0 0.0 - 0.2 10e3/uL    Absolute Immature Granulocytes 0.0 <=0.4 10e3/uL    Absolute NRBCs 0.0 10e3/uL   Magnesium    Collection Time: 07/16/22 10:40 AM   Result Value Ref Range    Magnesium 2.2 1.8 - 2.6 mg/dL   Glucose by meter    Collection Time: 07/17/22 11:37 AM   Result Value Ref Range    GLUCOSE BY METER POCT 164 (H) 70 - 99 mg/dL   Hemoglobin    Collection Time: 07/17/22  3:06 PM   Result Value Ref Range    Hemoglobin 12.0 (L) 13.3 - 17.7 g/dL   Glucose by meter    Collection Time: 07/17/22  4:55 PM   Result Value Ref Range    GLUCOSE BY METER POCT 115 (H) 70 - 99 mg/dL   Hemoglobin    Collection Time: 07/18/22  6:05 AM   Result Value Ref Range    Hemoglobin 11.5 (L) 13.3 - 17.7 g/dL   Extra Green Top (Lithium Heparin) Tube    Collection Time: 07/18/22  6:05 AM   Result Value Ref Range    Hold  Specimen     Basic metabolic panel    Collection Time: 07/18/22  6:05 AM   Result Value Ref Range    Sodium 139 136 - 145 mmol/L    Potassium 3.9 3.5 - 5.0 mmol/L    Chloride 107 98 - 107 mmol/L    Carbon Dioxide (CO2) 25 22 - 31 mmol/L    Anion Gap 7 5 - 18 mmol/L    Urea Nitrogen 23 8 - 28 mg/dL    Creatinine 0.82 0.70 - 1.30 mg/dL    Calcium 8.3 (L) 8.5 - 10.5 mg/dL    Glucose 104 70 - 125 mg/dL    GFR Estimate 84 >60 mL/min/1.73m2   Basic metabolic panel    Collection Time: 07/19/22  7:34 AM   Result Value Ref Range    Sodium 141 136 - 145 mmol/L    Potassium 3.7 3.5 - 5.0 mmol/L    Chloride 105 98 - 107 mmol/L    Carbon Dioxide (CO2) 29 22 - 31 mmol/L    Anion Gap 7 5 - 18 mmol/L    Urea Nitrogen 15 8 - 28 mg/dL    Creatinine 0.74 0.70 - 1.30 mg/dL    Calcium 8.6 8.5 - 10.5 mg/dL    Glucose 99 70 - 125 mg/dL    GFR Estimate 87 >60 mL/min/1.73m2   Magnesium    Collection Time: 07/19/22  7:34 AM   Result Value Ref Range    Magnesium 1.9 1.8 - 2.6 mg/dL   Hemoglobin    Collection Time: 07/19/22  7:34 AM   Result Value Ref Range    Hemoglobin 11.7 (L) 13.3 - 17.7 g/dL       PMH: History reviewed. No pertinent past medical history.        Current Medications:Scheduled Meds:    acetaminophen  650 mg Oral Q8H     aspirin  325 mg Oral BID     carbidopa-levodopa  1 tablet Oral TID    And     carbidopa-levodopa  1 half-tab Oral TID     ceFAZolin  2 g Intravenous See Admin Instructions     famotidine  20 mg Oral BID     latanoprost  1 drop Both Eyes At Bedtime     melatonin  1 mg Oral At Bedtime     polyethylene glycol  17 g Oral Daily     sodium chloride (PF)  3 mL Intracatheter Q8H     Continuous Infusions:  PRN Meds:.acetaminophen, sore throat lozenge, benztropine, bisacodyl, HYDROmorphone, lidocaine 4%, lidocaine (buffered or not buffered), magnesium hydroxide, naloxone **OR** naloxone **OR** naloxone **OR** naloxone, OLANZapine, ondansetron **OR** ondansetron, prochlorperazine **OR** prochlorperazine, QUEtiapine,  senna-docusate, sodium chloride (PF), sodium chloride (PF), sodium chloride (PF)                Family History: PERSONALLY REVIEWED.History reviewed. No pertinent family history.  Pertinent Family hx not pertinent to Chief Complaint or reason for visit.     Social History:  PERSONALLY REVIEWED.  Social History     Socioeconomic History     Marital status:      Spouse name: Not on file     Number of children: Not on file     Years of education: Not on file     Highest education level: Not on file   Occupational History     Not on file   Tobacco Use     Smoking status: Not on file     Smokeless tobacco: Not on file   Substance and Sexual Activity     Alcohol use: Not on file     Drug use: Not on file     Sexual activity: Not on file   Other Topics Concern     Not on file   Social History Narrative     Not on file     Social Determinants of Health     Financial Resource Strain: Not on file   Food Insecurity: Not on file   Transportation Needs: Not on file   Physical Activity: Not on file   Stress: Not on file   Social Connections: Not on file   Intimate Partner Violence: Not on file   Housing Stability: Not on file    not pertinent to Chief Complaint or reason for visit.             Allergies as of 06/01/2014 Reviewed     Review of Systems:As per HPI. Remainders of 12 point review of systems negative.    Review of Pertinent Laboratory:      PERSONALLY REVIEWED.    Physical Exam: Temp:  [97.6  F (36.4  C)-97.8  F (36.6  C)] 97.8  F (36.6  C)  Pulse:  [77-88] 77  Resp:  [18-22] 22  BP: ()/(56-69) 97/56  SpO2:  [93 %-94 %] 93 %   Vitals: reviewed in chart     Physical exam as per medical team: reviewed in chart      diagnoses, risk and benefits of medications discussed with staff. Care coordination with care management team.   Thank you for this consultation.       Kely Walker; NP  [facility]        This note was created with the help of Dragon dictation system. Grammatical and typing  errors are not intentional.

## 2022-07-19 NOTE — PLAN OF CARE
Goal Outcome Evaluation:  Pt has been calm and pleasantly confused.  He woke up and ate a few bites of supper, was cooperative with all meds and cares.  He coughs after swallowing.  On Sinemet, sent a note to pharmacy to change times of administration to before meals.  He denies pain, seems to be comfortable with scheduled Tylenol.  Scheduled Seroquel given, no prns needed.  Colostomy bag changed, pt had medium output.  He was incontinent of urine.  Hip dressing intact and clean.  1:1 no longer needed.    Problem: Plan of Care - These are the overarching goals to be used throughout the patient stay.    Goal: Absence of Hospital-Acquired Illness or Injury  Intervention: Identify and Manage Fall Risk  Recent Flowsheet Documentation  Taken 7/19/2022 1811 by Emi Lr RN  Safety Promotion/Fall Prevention: bed alarm on  Intervention: Prevent and Manage VTE (Venous Thromboembolism) Risk  Recent Flowsheet Documentation  Taken 7/19/2022 1811 by Emi Lr RN  VTE Prevention/Management: SCDs (sequential compression devices) on     Problem: Ongoing Anesthesia Effects (Hip Arthroplasty)  Goal: Anesthesia/Sedation Recovery  Intervention: Optimize Anesthesia Recovery  Recent Flowsheet Documentation  Taken 7/19/2022 1811 by Emi Lr RN  Safety Promotion/Fall Prevention: bed alarm on

## 2022-07-19 NOTE — PROGRESS NOTES
[facility]    Medicine Progress Note - Hospitalist Service    Date of Admission:  7/15/2022    Assessment & Plan          88-year-old male with past medical history of Parkinson's disease was brought to ED for evaluation of left hip pain after mechanical fall, found to have left hip fracture, underwent surgery and admitted for further management     1.Mechanical fall and sustained displaced left femur neck fracture;  - On 7/16, s/p left unipolar hip hemiarthroplasty by Dr. Matos  -Postoperative care, DVT prophylaxis per orthopedic team  -Added scheduled Tylenol, try to avoid narcotics given his age and delirium  - PT OT  -will need TCU     2.Acute blood loss, postoperative;  - Operative note reported  mL  - Hemoglobin fairly stable.  Hemoglobin 11.7     3.Acute metabolic encephalopathy;  -Likely due to narcotic medication, different surroundings  -  delirium protocol  -on 1:1  -check head ct  -psych consult  -speech eval for swallow     4.Left upper and lower extremity scattered superficial bruises due to fall;     5.History of colon s/p post surgery and colostomy bag;  - Patient's daughter reported on remission and taking daily MiraLAX, ordered.  - Continue colostomy bag care--has had small output     6.History of parkinsonism; with dementia--retired ([employer])  - Continue home medications     7.Leukocytosis on admission; likely stress.  Resolved  - On admission, no reported recent febrile illness, respiratory/GI/ symptoms.        8.DVT prophylaxis; per orthopedic team, they started patient on twice daily aspirin.      9.GI prophylaxis; continue home famotidine twice daily for GI prophylaxis while on twice daily aspirin    10.Code-Full code- verified with wife this am                Diet: Advance Diet as Tolerated: Regular Diet Adult  Diet    DVT Prophylaxis: per ortho, asa  Kevin Catheter: Not present  Central Lines: None  Cardiac Monitoring: None  Code  Status: Full Code      Disposition Plan      Expected Discharge Date: 07/21/2022        Discharge Comments: Therapy        The patient's care was discussed with the Bedside Nurse. I called wife to update 0902    Radha Rose MD  Hospitalist Service  [facility]  Securely message with the Vocera Web Console (learn more here)  Text page via [employer] Paging/Directory         Clinically Significant Risk Factors Present on Admission                     ______________________________________________________________________    Interval History   He required 1:1 all night  Biting , hitting behaviors  He mumbles only for me      Data reviewed today: I reviewed all medications, new labs and imaging results over the last 24 hours. I personally reviewed no images or EKG's today.    Physical Exam   Vital Signs: Temp: 97.8  F (36.6  C) Temp src: Axillary BP: 97/56 Pulse: 77   Resp: 22 SpO2: 93 % O2 Device: None (Room air)    Weight: 137 lbs 0 oz  Constitutional: awake, fatigued, alert, uncooperative and no apparent distress  Eyes: sclera clear  Respiratory: No increased work of breathing, good air exchange, clear to auscultation bilaterally, no crackles or wheezing  Cardiovascular: Normal apical impulse, regular rate and rhythm, normal S1 and S2, no S3 or S4, and no murmur noted  GI: hypoactive bowel sounds, soft, non-distended and non-tender, ostomy small amount of stool  Skin: no bruising or bleeding  Musculoskeletal: no lower extremity pitting edema present  Neurologic: Mental Status Exam:  Level of Alertness:   awake  Language:  abnormal - mumbles alot  Motor Exam:  bradykinesia    Data   Recent Labs   Lab 07/19/22  0734 07/18/22  0605 07/17/22  1655 07/17/22  1506 07/17/22  1137 07/16/22  1040 07/15/22  2132   WBC  --   --   --   --   --  8.4 14.8*   HGB 11.7* 11.5*  --  12.0*  --  13.3 14.5   MCV  --   --   --   --   --  93 94   PLT  --   --   --   --   --  154 193    139  --   --   --  141  143   POTASSIUM 3.7 3.9  --   --   --  3.8 4.2   CHLORIDE 105 107  --   --   --  105 105   CO2 29 25  --   --   --  28 27   BUN 15 23  --   --   --  26 33*   CR 0.74 0.82  --   --   --  0.98 1.26   ANIONGAP 7 7  --   --   --  8 11   SELINA 8.6 8.3*  --   --   --  8.6 9.4   GLC 99 104 115*  --    < > 110 103   ALBUMIN  --   --   --   --   --   --  3.8   PROTTOTAL  --   --   --   --   --   --  7.1   BILITOTAL  --   --   --   --   --   --  1.5*   ALKPHOS  --   --   --   --   --   --  96   ALT  --   --   --   --   --   --  <9   AST  --   --   --   --   --   --  28    < > = values in this interval not displayed.     Recent Results (from the past 24 hour(s))   CT Head w/o Contrast    Narrative    EXAM: CT HEAD W/O CONTRAST  LOCATION: [facility]  DATE/TIME: 7/19/2022 9:13 AM    INDICATION: 88 y o with Parkinson, delirium after hip surgery. Evaluate for stroke.   COMPARISON: None.  TECHNIQUE: Routine CT Head without IV contrast. Multiplanar reformats. Dose reduction techniques were used.    FINDINGS: Image quality mildly degraded by motion.    INTRACRANIAL CONTENTS: No intracranial hemorrhage, extraaxial collection, or mass effect.  No CT evidence of acute infarct. Mild presumed chronic small vessel ischemic changes. Mild to moderate generalized volume loss. No hydrocephalus. Cavum septum   pellucidum noted.    VISUALIZED ORBITS/SINUSES/MASTOIDS: No intraorbital abnormality. No paranasal sinus mucosal disease. No middle ear or mastoid effusion.    BONES/SOFT TISSUES: No acute abnormality.      Impression    IMPRESSION:  1.  Within constraints of motion, no acute intracranial abnormality.  2.  Brain atrophy and presumed chronic microvascular ischemic changes as above.

## 2022-07-19 NOTE — PLAN OF CARE
Problem: Plan of Care - These are the overarching goals to be used throughout the patient stay.    Goal: Plan of Care Review/Shift Note     Goal Outcome Evaluation:    Patient drowsy majority of the day. Did communicate with nurse for a short time and then falls back to sleep. Follows commands slowly when he is awake. Transferred into chair with the assist of (2) using transfer belt and rolling walker along with verbal cues/direction.Pt/OT/ST scheduled.  Did not eat lunch d/t drowsiness. Incontinent of urine large amounts x2. Small stool in colostomy bag. No behaviors noted. Psych following. Daughter and spouse did visit and requested supplements d/t losing weight the past few months. Supplements ordered. Sacral area reddened-barrier cream applied and turned on his side. Will monitor closely.   Flores Butler RN

## 2022-07-19 NOTE — PROGRESS NOTES
"Orthopedic Progress Note      Assessment: 3 Days Post-Op  S/P Procedure(s):  HEMIARTHROPLASTY, HIP, BIPOLAR     Plan:   - Continue PT/OT  - Weightbearing status: WBAT , posterior hip precautions   - Pain Management   - Anticoagulation:  PO BID in addition to SCDs, angeles stockings and early ambulation.  - Discharge planning: likely TCU pending PT/OT progression, pain control, medical clearance       Subjective:  Pain: minimal  Nausea, Vomiting:  No  Lightheadedness, Dizziness:  No  Neuro:  Patient denies new onset numbness or paresthesias    Patient sleeping comfortably on the recliner.     Objective:  BP 97/56 (BP Location: Left arm, Patient Position: Semi-Victoria's, Cuff Size: Adult Small)   Pulse 77   Temp 97.8  F (36.6  C) (Axillary)   Resp 22   Ht 1.727 m (5' 8\")   Wt 62.1 kg (137 lb)   SpO2 93%   BMI 20.83 kg/m    The patient is A&Ox3. Appears comfortable.   Sensation is intact.  Dorsiflexion and plantar flexion is intact. Wiggle toes intact  Dorsalis pedis pulse intact.  Calves are soft and non-tender. Negative Noam's.  The incision is covered. Dressing C/D/I.    No drain     Pertinent Labs   Lab Results: personally reviewed.   No results found for: INR, PROTIME  Lab Results   Component Value Date    WBC 8.4 07/16/2022    HGB 11.7 (L) 07/19/2022    HCT 40.1 07/16/2022    MCV 93 07/16/2022     07/16/2022     Lab Results   Component Value Date     07/19/2022    CO2 29 07/19/2022         Report completed by:  Zoraida Dela Cruz PA-C, ERLIN  July 19, 2022      "

## 2022-07-19 NOTE — PLAN OF CARE
Assumed care 2300 to 0730. A&O x 1, disoriented to time, place, & situation. Assist x 2, team lift. Non-verbal indicators of pain present, IV Dilaudid & oral Tylenol given (see MAR). Incontinent of urine, ostomy bag in place. Pt is agitated, attempting to get out of bed. Pt hits, kicks, and attempts to bite staff with any cares. Spit out medication and refused offers of water. Refused other attempts to pass meds with applesauce. IM Zyprexa given for aggressive behavior (see MAR). Behavior improved following IM Zyprexa. Pt did not sleep this shift. Call light within reach. Bed alarm on for safety.

## 2022-07-19 NOTE — PROGRESS NOTES
Patient was fed his breakfast-50% of his pancake and oatmeal with HOB elevated. Began to cough a great deal. Weak wet productive cough. Orally suctioned with success. MD updated-new orders provided.   Flores Butler RN

## 2022-07-19 NOTE — PROGRESS NOTES
"Patient putting legs over the side rail and attempting to get out of bed. Eyes closed, taking gown off.  Scheduled tylenol and prn Seroquel administered. Asked if he has pain replied \"yes, my whole body\"  Crushed meds and placed in applesauce. Paient took tiny bites and did consume it all. Repositioned back into bed. 1:1 remains.  Flores Butler RN  "

## 2022-07-20 NOTE — PLAN OF CARE
Problem: Risk for Delirium  Goal: Improved Attention and Thought Clarity  Outcome: Ongoing, Progressing     Problem: Plan of Care - These are the overarching goals to be used throughout the patient stay.    Goal: Optimal Comfort and Wellbeing  Outcome: Ongoing, Progressing  Intervention: Monitor Pain and Promote Comfort  Recent Flowsheet Documentation  Taken 7/20/2022 0945 by Chrissie Dela Cruz RN  Pain Management Interventions:   emotional support   repositioned   pillow support provided     Problem: Cognitive Impairment (Dementia Signs/Symptoms)  Goal: Optimized Cognitive Function (Dementia Signs/Symptoms)  Outcome: Ongoing, Progressing     Problem: Nutrition Imbalance (Dementia Signs/Symptoms)  Goal: Optimized Nutrition Intake (Dementia Signs/Symptoms)  Outcome: Ongoing, Progressing     Problem: Bowel Motility Impaired (Hip Arthroplasty)  Goal: Effective Bowel Elimination  Outcome: Ongoing, Progressing     Problem: Respiratory Compromise (Hip Arthroplasty)  Goal: Effective Oxygenation and Ventilation  Outcome: Ongoing, Progressing     Goal Outcome Evaluation:    Pt was very sleepy during this shift. Did not eat any breakfast. Pt continues to have a productive cough and speech is very garbled. Pt could only answer a few questions but responses were not clear. Attempted to give medication in applesauce when more awake but patient had difficulty swallowing. Worked with speech therapy and did not do well.

## 2022-07-20 NOTE — PLAN OF CARE
Problem: Risk for Delirium  Goal: Improved Attention and Thought Clarity  Outcome: Ongoing, Not Progressing   Goal Outcome Evaluation:      Patient continues to be very confused. Patient is easily redirected.

## 2022-07-20 NOTE — PROGRESS NOTES
"Orthopedic Progress Note      Assessment: 4 Days Post-Op  S/P Procedure(s):  HEMIARTHROPLASTY, HIP, BIPOLAR     Plan:   - Continue PT/OT  - Weightbearing status: WBAT , posterior hip precautions   - Pain Management   - Anticoagulation:  PO BID in addition to SCDs, angeles stockings and early ambulation.  - Discharge planning: likely TCU pending PT/OT progression, pain control, medical clearance       Subjective:  Pain: minimal  Nausea, Vomiting:  No  Lightheadedness, Dizziness:  No  Neuro:  Patient denies new onset numbness or paresthesias    Patient reports feeling well. Patient laying in bed and does not appear to be in pain. Tolerated exam without visible pain cues.     Objective:  /81 (BP Location: Left arm)   Pulse 65   Temp 98.8  F (37.1  C) (Oral)   Resp 16   Ht 1.727 m (5' 8\")   Wt 62.1 kg (137 lb)   SpO2 94%   BMI 20.83 kg/m    The patient is A&Ox3. Appears comfortable.   Sensation is intact.  Dorsiflexion and plantar flexion is intact. Wiggle toes intact  Dorsalis pedis pulse intact.  Calves are soft and non-tender. Negative Noam's.  The incision is covered. Dressing C/D/I.    No drain     Pertinent Labs   Lab Results: personally reviewed.   No results found for: INR, PROTIME  Lab Results   Component Value Date    WBC 8.4 07/16/2022    HGB 10.5 (L) 07/20/2022    HCT 40.1 07/16/2022    MCV 93 07/16/2022     07/16/2022     Lab Results   Component Value Date     07/20/2022    CO2 28 07/20/2022         Report completed by:  Zoraida Dela Cruz PA-C, ERLIN  July 20, 2022      "

## 2022-07-20 NOTE — PROGRESS NOTES
"[facility]    Medicine Progress Note - Hospitalist Service    Date of Admission:  7/15/2022    Assessment & Plan             88-year-old male with past medical history of Parkinson's disease was brought to ED for evaluation of left hip pain after mechanical fall, found to have left hip fracture, underwent surgery and admitted for further management     1.Mechanical fall and sustained displaced left femur neck fracture;  - On 7/16, s/p left unipolar hip hemiarthroplasty by Dr. Matos  -Postoperative care, DVT prophylaxis per orthopedic team  -Added scheduled Tylenol, try to avoid narcotics given his age and delirium  - PT OT  -will need TCU     2.Acute blood loss, postoperative;  - Operative note reported  mL  - Hemoglobin fairly stable.  Hemoglobin 11.7 on 7/19     3.Acute metabolic encephalopathy;  -Likely due to narcotic medication, different surroundings  -  delirium protocol  -was on 1:1 til late on 7/19  -checked head ct-neg  -psych consult-apprecaited  \"Seroquel 12.5 mg 3 times a day as needed.  Efforts at sleep regulation.  Delirium prevention protocol.  Consider melatonin 3 mg at bedtime.  Monitor for sedation, reassure, reorient frequently.  Address pain.  Add Seroquel 12.5 mg at bedtime.  Seroquel 6.25 mg before supper.\"----per Gregroia Quick NP     -speech eval for swallow--pending still     4.Left upper and lower extremity scattered superficial bruises due to fall;     5.History of colon s/p post surgery and colostomy bag;  - Patient's daughter reported on remission and taking daily MiraLAX, ordered.  - Continue colostomy bag care--has had small output     6.History of parkinsonism; with dementia--retired ([employer])  - Continue home medications     7.Leukocytosis on admission; likely stress.  Resolved  - On admission, no reported recent febrile illness, respiratory/GI/ symptoms.        8.DVT prophylaxis; per orthopedic team, they started patient " "on twice daily aspirin.       9.GI prophylaxis; continue home famotidine twice daily for GI prophylaxis while on twice daily aspirin     10.Code-Full code- verified with wife         Addendum at 1500  Met with family and pt  He is better, but still slurring words  Failed bedside speech eval, needs video swallow in am  Will get mri, could be from Seroquel  Confirmed with wife, daughter on phone and son here Full code still           Diet: Advance Diet as Tolerated: Regular Diet Adult  Diet  Snacks/Supplements Adult: Magic Cup; With Meals  Snacks/Supplements Adult: Ensure Enlive; Between Meals    DVT Prophylaxis: per ortho asa  Kevin Catheter: Not present  Central Lines: None  Cardiac Monitoring: None  Code Status: Full Code      Disposition Plan      Expected Discharge Date: 07/22/2022        Discharge Comments: Therapy        The patient's care was discussed with the Bedside Nurse. Called wife at 1246 to update--no answer . Called daughter Ruchi at 1246--left message    Radha Rose MD  Hospitalist Service  [employer]  Securely message with the Vocera Web Console (learn more here)  Text page via [employer] Paging/Directory         Clinically Significant Risk Factors Present on Admission                      ______________________________________________________________________    Interval History   He wakes for me  When I asked about pain in hip he states \"I know it is there\"  He still mumbles some , but able to follow commands better  RN notes off 1:1 last pm    Data reviewed today: I reviewed all medications, new labs and imaging results over the last 24 hours. I personally reviewed no images or EKG's today.    Physical Exam   Vital Signs: Temp: 98.8  F (37.1  C) Temp src: Oral BP: 131/56 Pulse: 86   Resp: 16 SpO2: 92 % O2 Device: None (Room air)    Weight: 137 lbs 0 oz  Constitutional: awake, fatigued, alert, cooperative and no apparent distress  Respiratory: No increased work of " breathing, good air exchange, clear to auscultation bilaterally, no crackles or wheezing  Cardiovascular: Normal apical impulse, regular rate and rhythm, normal S1 and S2, no S3 or S4, and no murmur noted  GI: hypoactive bowel sounds, soft, non-distended and non-tender, ostomy  Skin: ecchymosis scattered  Musculoskeletal: no lower extremity pitting edema present  Neurologic: Mental Status Exam:  Level of Alertness:   awake  Language:  abnormal - still mumbles, but does speak a few more words clearly  Bradykinesia    Neuropsychiatric: General: calm    Data   Recent Labs   Lab 07/19/22  0734 07/18/22  0605 07/17/22  1655 07/17/22  1506 07/17/22  1137 07/16/22  1040 07/15/22  2132   WBC  --   --   --   --   --  8.4 14.8*   HGB 11.7* 11.5*  --  12.0*  --  13.3 14.5   MCV  --   --   --   --   --  93 94   PLT  --   --   --   --   --  154 193    139  --   --   --  141 143   POTASSIUM 3.7 3.9  --   --   --  3.8 4.2   CHLORIDE 105 107  --   --   --  105 105   CO2 29 25  --   --   --  28 27   BUN 15 23  --   --   --  26 33*   CR 0.74 0.82  --   --   --  0.98 1.26   ANIONGAP 7 7  --   --   --  8 11   SELINA 8.6 8.3*  --   --   --  8.6 9.4   GLC 99 104 115*  --    < > 110 103   ALBUMIN  --   --   --   --   --   --  3.8   PROTTOTAL  --   --   --   --   --   --  7.1   BILITOTAL  --   --   --   --   --   --  1.5*   ALKPHOS  --   --   --   --   --   --  96   ALT  --   --   --   --   --   --  <9   AST  --   --   --   --   --   --  28    < > = values in this interval not displayed.     Recent Results (from the past 24 hour(s))   CT Head w/o Contrast    Narrative    EXAM: CT HEAD W/O CONTRAST  LOCATION: [facility]  DATE/TIME: 7/19/2022 9:13 AM    INDICATION: 88 y o with Parkinson, delirium after hip surgery. Evaluate for stroke.   COMPARISON: None.  TECHNIQUE: Routine CT Head without IV contrast. Multiplanar reformats. Dose reduction techniques were used.    FINDINGS: Image quality mildly degraded by  motion.    INTRACRANIAL CONTENTS: No intracranial hemorrhage, extraaxial collection, or mass effect.  No CT evidence of acute infarct. Mild presumed chronic small vessel ischemic changes. Mild to moderate generalized volume loss. No hydrocephalus. Cavum septum   pellucidum noted.    VISUALIZED ORBITS/SINUSES/MASTOIDS: No intraorbital abnormality. No paranasal sinus mucosal disease. No middle ear or mastoid effusion.    BONES/SOFT TISSUES: No acute abnormality.      Impression    IMPRESSION:  1.  Within constraints of motion, no acute intracranial abnormality.  2.  Brain atrophy and presumed chronic microvascular ischemic changes as above.

## 2022-07-20 NOTE — PROGRESS NOTES
"   07/20/22 1415   General Information   Onset of Illness/Injury or Date of Surgery 07/15/22   Referring Physician Radha Rose MD   Patient/Family Therapy Goal Statement (SLP) Patient did not state but agreed/participated in exam.   Pertinent History of Current Problem Per provider note: \"88-year-old male with past medical history of Parkinson's disease was brought to ED for evaluation of left hip pain after mechanical fall, found to have left hip fracture, underwent surgery and admitted for further management.\" Remote hx of VFSS 2007 with no aspiration or penetration per radioloist note. No further inforation available.   General Observations Patient alert, communicative/interactive. Speech intelligibility poor. Patient's spouse and son present and report worsened speech intelligibility since admission. They reported no swallowing concerns PTA and that patient had been on regular diet. They reported frequent cough but that this did not seem worse with PO intake. RN reported coughing with liquids and oral meds today.   Past History of Dysphagia See above.   Type of Evaluation   Type of Evaluation Swallow Evaluation   Oral Motor   Oral Musculature anomalies present   Structural Abnormalities none present   Dentition (Oral Motor)   Dentition (Oral Motor) adequate dentition   Facial Symmetry (Oral Motor)   Facial Symmetry (Oral Motor) WNL   Lip Function (Oral Motor)   Lip Range of Motion (Oral Motor) WNL   Lip Strength (Oral Motor) WFL   Tongue Function (Oral Motor)   Tongue Strength (Oral Motor) strength decreased;bilateral;minimal impairment   Tongue Coordination/Speed (Oral Motor) reduced rate   Tongue ROM (Oral Motor) protrusion is impaired   Protrusion, Tongue ROM Impairment (Oral Motor) minimal impairment   Jaw Function (Oral Motor)   Jaw Function (Oral Motor) WNL   Cough/Swallow/Gag Reflex (Oral Motor)   Soft Palate/Velum (Oral Motor) WNL   Volitional Throat Clear/Cough (Oral Motor) reduced strength "   Volitional Swallow (Oral Motor) mildly delayed   Vocal Quality/Secretion Management (Oral Motor)   Vocal Quality (Oral Motor) breathy;hoarse;hypophonic;wet/gurgly   Secretion Management (Oral Motor) wet/gurgly vocal quality   General Swallowing Observations   Comment, General Swallowing Observations Patient needed frequent cues to initiate tasks/movement.   Respiratory Support (General Swallowing Observations) none   Current Diet/Method of Nutritional Intake (General Swallowing Observations, NIS) regular diet;thin liquids (level 0)   Swallowing Evaluation Clinical swallow evaluation   Clinical Swallow Evaluation   Feeding Assistance dependent   Clinical Swallow Evaluation Textures Trialed thin liquids;mildly thick liquids;moderately thick liquids/liquidized;pureed   Clinical Swallow Eval: Thin Liquid Texture Trial   Mode of Presentation, Thin Liquids spoon;fed by clinician   Volume of Liquid or Food Presented 2 small ice chips   Oral Phase of Swallow delayed AP movement;premature pharyngeal entry  (suspected)   Pharyngeal Phase of Swallow repeated swallows;throat clearing;wet vocal quality after swallow   Clinical Swallow Eval: Mildly Thick Liquids   Mode of Presentation spoon;fed by clinician   Volume Presented 2 sips   Oral Phase delayed AP movement;premature pharyngeal entry  (suspected)   Pharyngeal Phase repeated swallows;throat clearing;wet vocal quality after swallow   Clinical Swallow Eval: Moderately Thick Liquids   Mode of Presentation spoon;cup;self-fed;fed by clinician  ([place] assist)   Oral Phase   (impulsive with large gulps; able to follow cue for small single sip x1)   Pharyngeal Phase repeated swallows;wet vocal quality after swallow   Clinical Swallow Evaluation: Puree Solid Texture Trial   Mode of Presentation, Puree spoon;fed by clinician   Volume of Puree Presented 2 bites applesauce   Oral Phase, Puree delayed AP movement   Pharyngeal Phase, Puree wet vocal quality after swallow   Esophageal  Phase of Swallow   Patient reports or presents with symptoms of esophageal dysphagia No   Swallowing Recommendations   Diet Consistency Recommendations NPO;consider alternative means of nutrition  (except essential medications (crushed) per MD and limited ice chips (1-2 per hour) as tolerated for comfort)   Supervision Level for Intake 1:1 supervision needed   Mode of Delivery Recommendations bolus size, small   Monitoring/Assistance Required (Eating/Swallowing) stop eating activities when fatigue is present;monitor for cough or change in vocal quality with intake   Medication Administration Recommendations, Swallowing (SLP) Essential medications crushed if able per MD   General Therapy Interventions   Planned Therapy Interventions Dysphagia Treatment   Dysphagia treatment Oropharyngeal exercise training;Modified diet education;Instruction of safe swallow strategies;Compensatory strategies for swallowing   Clinical Impression   Criteria for Skilled Therapeutic Interventions Met (SLP Eval) Yes, treatment indicated   SLP Diagnosis Dysphagia   Risks & Benefits of therapy have been explained evaluation/treatment results reviewed;care plan/treatment goals reviewed;risks/benefits reviewed;current/potential barriers reviewed;participants voiced agreement with care plan;participants included;patient;spouse/significant other;son   Clinical Impression Comments Moderate-severe oropharyngeal dysphagia based on clinical exam today characterized by reduced oral bolus control, suspected premature pharyngeal entry, delayed-appearing swallow and immediate throat clearing or wet/gurgly vocal quality after PO intake across consistencies. Patient appears at high risk for aspiration. Moderate hypokinetic dysarthria and dysphonia observed. Recommend NPO status with temporary alternate route for nutrition/hydration. Essential medications crushed per MD. Recommend VFSS to further assess oropharyngeal swallow function. Please continue  excellent oral cares. Patient may have limited single ice chips (1-2 per hour) as tolerated for comfort. Encourage patient to cough up phlegm.   SLP Discharge Planning   SLP Discharge Recommendation Transitional Care Facility   SLP Rationale for DC Rec Below baseline for swallowing.   SLP Brief overview of current status  Recommend NPO status with temporary alternate route for nutrition/hydration. Essential medications crushed per MD. Recommend VFSS to further assess oropharyngeal swallow function. Please continue excellent oral cares. Patient may have limited single ice chips (1-2 per hour) as tolerated for comfort. Encourage patient to cough up phlegm.    Total Evaluation Time   Total Evaluation Time (Minutes) 20   SLP Goals   Therapy Frequency (SLP Eval) daily   SLP Goals Swallow

## 2022-07-21 NOTE — PLAN OF CARE
Problem: Risk for Delirium  Goal: Improved Behavioral Control  7/21/2022 1211 by Chrsisie Dela Cruz RN  Outcome: Ongoing, Progressing     Problem: Plan of Care - These are the overarching goals to be used throughout the patient stay.    Goal: Optimal Comfort and Wellbeing  7/21/2022 1211 by Chrissie Dela Cruz RN  Outcome: Ongoing, Progressing     Problem: Behavior Regulation Impairment (Dementia Signs/Symptoms)  Goal: Improved Behavioral Control (Dementia Signs/Symptoms)  7/21/2022 1211 by Chrissie Dela Cruz RN  Outcome: Ongoing, Progressing     Problem: Cognitive Impairment (Dementia Signs/Symptoms)  Goal: Optimized Cognitive Function (Dementia Signs/Symptoms)  7/21/2022 1216 by Chrissie Dela Cruz RN  Outcome: Ongoing, Not Progressing     Problem: Mood Impairment (Dementia Signs/Symptoms)  Goal: Improved Mood Symptoms (Dementia Signs/Symptoms)  7/21/2022 1211 by Chrissie Dela Cruz RN  Outcome: Ongoing, Progressing     Problem: Nutrition Imbalance (Dementia Signs/Symptoms)  Goal: Optimized Nutrition Intake (Dementia Signs/Symptoms)  7/21/2022 1216 by Chrissie Dela Cruz RN  Outcome: Ongoing, Not Progressing     Problem: Sleep Disturbance (Dementia Signs/Symptoms)  Goal: Improved Sleep (Dementia Signs/Symptoms)  7/21/2022 1211 by Chrissie Dela Cruz RN  Outcome: Ongoing, Progressing     Problem: Social or Functional Impairment (Dementia Signs/Symptoms)  Goal: Enhanced Social or Functional Skills and Ability (Dementia Signs/Symptoms)  7/21/2022 1216 by Chrissie Dela Cruz RN  Outcome: Ongoing, Not Progressing     Problem: Bowel Motility Impaired (Hip Arthroplasty)  Goal: Effective Bowel Elimination  7/21/2022 1216 by Chrissie Dela Cruz RN  Outcome: Ongoing, Not Progressing     Goal Outcome Evaluation:  Pt was calm and cooperative this shift. Expressed no signs of pain. Started potassium protocol. Had a video swallow study. Tolerating small bites of crushed meds/applesauce with minimal coughs afterward. Awaiting results of swallow study.

## 2022-07-21 NOTE — PROGRESS NOTES
Care Management Follow Up    Length of Stay (days): 6    Expected Discharge Date: 07/22/2022     Concerns to be Addressed: discharge planning       Patient plan of care discussed at interdisciplinary rounds: Yes    Anticipated Discharge Disposition: Transitional Care     Anticipated Discharge Services: Other (see comment) (therapy services)    Anticipated Discharge DME: None    Patient/family educated on Medicare website which has current facility and service quality ratings: yes    Education Provided on the Discharge Plan:  Yes    Patient/Family in Agreement with the Plan: yes    Referrals Placed by CM/SW: Post Acute Facilities    Private pay costs discussed: private room/amenity fees for [facility] - pt/family okay with this     Additional Information: KERA attempted to meet with pt this morning but he was sleeping.  SW addressed pt to try to obtain a response.  Noted that although pt physically moved slightly in bed he did not awaken.  SW will call pt's wife.      KERA spoke with pt's wife Betty regarding discharge planning.  Discussed therapy recommendation for TCU for pt.  Betty requested that KERA e-mail TCU list to her as well as her daughter Ruchi, and provided SW with e-mail addresses for both herself and Ruchi to do so (sent).  They will review list and discuss potential referral choices.  She and her son will be coming to hospital later around 2 PM.  KERA plans to follow up with them at 2 PM this afternoon for choices.  Discussed that pt off 1:1.  Betty stated that pt was calmer yesterday when she visited him.  He was sitting up in chair.  He was probably relieved to have the foam off between his legs and not have his legs all bound like before.  He may have had this off so that he could participate in therapy.      KERA met with pt, pt's wife Betty, and pt's son to discuss discharge planning.  Discussed that [place], [place], and [place] were their preferred areas for TCU but then requested that KERA  send referrals to facilities in other areas: [facility], [facility], and [facility] (sent and pending).  Input was also obtained from pt's daughter Ruchi via phone (pt's son called her while SW in the room).  Noted that Dr Calderonarity present in pt's room for part of discussion.  Pt slurring and talking like he has something in his throat.  Pt has had cough.  Pt at risk for silent aspiration.  Pt to have video swallow study tomorrow.      KERA sent TCU referrals to [facility], [facility], and [facility] per pt/family request and all are pending.  Transportation TBD.  Needs PAS.        ALEXANDRIA Nova, ROBB 07/21/22 5:32 PM

## 2022-07-21 NOTE — PLAN OF CARE
Problem: Risk for Delirium  Goal: Optimal Coping  Outcome: Ongoing, Not Progressing  Goal: Improved Behavioral Control  Outcome: Ongoing, Not Progressing  Goal: Improved Attention and Thought Clarity  Outcome: Ongoing, Not Progressing  Goal: Improved Sleep  Outcome: Ongoing, Not Progressing     Problem: Plan of Care - These are the overarching goals to be used throughout the patient stay.    Goal: Absence of Hospital-Acquired Illness or Injury  Outcome: Ongoing, Not Progressing  Intervention: Identify and Manage Fall Risk  Recent Flowsheet Documentation  Taken 7/20/2022 1612 by Sylvie Richmond RN  Safety Promotion/Fall Prevention:   bed alarm on   room near nurse's station   room organization consistent   safety round/check completed   nonskid shoes/slippers when out of bed   mobility aid in reach   patient and family education   toileting scheduled  Intervention: Prevent Skin Injury  Recent Flowsheet Documentation  Taken 7/20/2022 2145 by Sylvie Richmond RN  Body Position:   turned   supine, head elevated  Taken 7/20/2022 1820 by Sylvie Richmond RN  Body Position:   turned   left  Taken 7/20/2022 1612 by Sylvie Richmond RN  Body Position:   turned   supine, head elevated  Intervention: Prevent and Manage VTE (Venous Thromboembolism) Risk  Recent Flowsheet Documentation  Taken 7/20/2022 2145 by Sylvie Richmond RN  Activity Management: activity minimized  Taken 7/20/2022 1612 by Sylvie Richmond RN  VTE Prevention/Management: SCDs (sequential compression devices) on  Goal: Readiness for Transition of Care  Outcome: Ongoing, Not Progressing     Problem: Behavior Regulation Impairment (Dementia Signs/Symptoms)  Goal: Improved Behavioral Control (Dementia Signs/Symptoms)  Outcome: Ongoing, Not Progressing     Problem: Cognitive Impairment (Dementia Signs/Symptoms)  Goal: Optimized Cognitive Function (Dementia Signs/Symptoms)  Outcome: Ongoing, Not Progressing     Problem: Mood Impairment (Dementia  Signs/Symptoms)  Goal: Improved Mood Symptoms (Dementia Signs/Symptoms)  Outcome: Ongoing, Not Progressing     Problem: Nutrition Imbalance (Dementia Signs/Symptoms)  Goal: Optimized Nutrition Intake (Dementia Signs/Symptoms)  Outcome: Ongoing, Not Progressing     Problem: Sleep Disturbance (Dementia Signs/Symptoms)  Goal: Improved Sleep (Dementia Signs/Symptoms)  Outcome: Ongoing, Not Progressing     Problem: Social or Functional Impairment (Dementia Signs/Symptoms)  Goal: Enhanced Social or Functional Skills and Ability (Dementia Signs/Symptoms)  Outcome: Ongoing, Not Progressing     Problem: Adjustment to Surgery (Hip Arthroplasty)  Goal: Optimal Coping  Outcome: Ongoing, Not Progressing     Problem: Bowel Motility Impaired (Hip Arthroplasty)  Goal: Effective Bowel Elimination  Outcome: Ongoing, Not Progressing     Problem: Plan of Care - These are the overarching goals to be used throughout the patient stay.    Goal: Plan of Care Review/Shift Note  Outcome: Ongoing, Progressing  Goal: Patient-Specific Goal (Individualized)  Outcome: Ongoing, Progressing  Goal: Optimal Comfort and Wellbeing  Outcome: Ongoing, Progressing  Intervention: Monitor Pain and Promote Comfort  Recent Flowsheet Documentation  Taken 7/20/2022 1612 by Sylvie Richmond RN  Pain Management Interventions:   medication offered but refused   repositioned     Problem: Pain Acute  Goal: Acceptable Pain Control and Functional Ability  Outcome: Ongoing, Progressing  Intervention: Develop Pain Management Plan  Recent Flowsheet Documentation  Taken 7/20/2022 1612 by Sylvie Richmond RN  Pain Management Interventions:   medication offered but refused   repositioned     Problem: Bleeding (Hip Arthroplasty)  Goal: Absence of Bleeding  Outcome: Ongoing, Progressing     Problem: Pain (Hip Arthroplasty)  Goal: Acceptable Pain Control  Outcome: Ongoing, Progressing  Intervention: Prevent or Manage Pain  Recent Flowsheet Documentation  Taken 7/20/2022 1612  by Sylvie Richmond, RN  Pain Management Interventions:   medication offered but refused   repositioned     Problem: Postoperative Urinary Retention (Hip Arthroplasty)  Goal: Effective Urinary Elimination  Outcome: Ongoing, Progressing     Problem: Respiratory Compromise (Hip Arthroplasty)  Goal: Effective Oxygenation and Ventilation  Outcome: Ongoing, Progressing  Intervention: Optimize Oxygenation and Ventilation  Recent Flowsheet Documentation  Taken 7/20/2022 2145 by Sylvie Richmond, RN  Head of Bed (HOB) Positioning: HOB at 20-30 degrees  Taken 7/20/2022 1820 by Sylvie Richmond, RN  Head of Bed (HOB) Positioning: HOB at 20-30 degrees  Taken 7/20/2022 1612 by Sylvie Richmond, RN  Head of Bed (HOB) Positioning: HOB at 30-45 degrees   Goal Outcome Evaluation:    At beginning of shift reported posterior neck discomfort when asked about pain, declined medication. Repositioned very frequently during shift. Patient non-compliant with positioning restrictions, wiggles out of abductor pillow & removes pillow between legs. Took meds crushed in applesauce, Seroquel nor Zyprexa seemed to help with patient restlessness, notified Charge Nurse that 1:1 sitter would be needed for patient safety as patient very impulsive & frequently tries to climb out of bed. No BM in colostomy during shift. New IV placed by SWAT nurse d/t old IV leaking (removed). Kerlix over new IV in left wrist, D5NS running at 50 mL/hr (new order today as patient is NPO except meds until Xray video swallow study tomorrow AM). Had Brain MRI, nothing acute noted.

## 2022-07-21 NOTE — PROGRESS NOTES
"Speech-Language Pathology: Video Swallow Study     07/21/22 1300   General Information   Onset of Illness/Injury or Date of Surgery 07/15/22   Referring Physician Radha Rose MD   Pertinent History of Current Problem Per provider note: \"88-year-old male with past medical history of Parkinson's disease was brought to ED for evaluation of left hip pain after mechanical fall, found to have left hip fracture, underwent surgery and admitted for further management.\" Remote hx of VFSS 2007 with no aspiration or penetration per radioloist note. No further inforation available.   General Observations Alert with somewhat slurred speech and word finding issues/confusion   General Swallowing Observations   Swallowing Evaluation Videofluoroscopic swallow study (VFSS)   VFSS Evaluation   Radiologist Dr. Carter   Views Taken left lateral   Physical Location of Procedure [facility]   VFSS Textures Trialed thin liquids;mildly thick liquids;moderately thick liquids/liquidized   VFSS Eval: Thin Liquid Texture Trial   Mode of Presentation, Thin Liquid cup;self-fed   Order of Presentation 1,3   Preparatory Phase Poor bolus control   Oral Phase, Thin Liquid Delayed AP movement;Residue in oral cavity;Premature pharyngeal entry   Pharyngeal Phase, Thin Liquid Delayed swallow reflex;Residue in valleculae;Residue in pyriform sinus;UES dysfunction;other (see comments)  (incomplete epiglottic inversion)   Rosenbek's Penetration Aspiration Scale: Thin Liquid Trial Results 8 - contrast passes glottis, visible subglottic residue remains, absent patient response (aspiration)   Response to Aspiration absent response, silent aspiration   VFSS Eval: Mildly Thick Liquids   Mode of Presentation cup;self-fed   Order of Presentation 6   Preparatory Phase Poor bolus control   Oral Phase Delayed AP movement;Residue in oral cavity;Premature pharyngeal entry   Pharyngeal Phase Delayed swallow reflex;Residue in valleculae;Residue in " pyriform sinus;UES dysfunction;Other (see comments)  (incomplete epiglottic inversion)   Rosenbek's Penetration Aspiration Scale 8 - contrast passes glottis, visible subglottic residue remains, absent patient response (aspiration)   Response to Aspiration absent response, silent aspiration   VFSS Eval: Moderately Thick Liquids   Mode of Presentation spoon;fed by clinician   Order of Presentation 6   Preparatory Phase Poor bolus control   Oral Phase Delayed AP movement;Residue in oral cavity;Premature pharyngeal entry   Pharyngeal Phase Delayed swallow reflex;Residue in valleculae;Residue in pyriform sinus;UES dysfunction;other (see comments)  (incomplete epiglottic inversion)   Rosenbek's Penetration Aspiration Scale 8 - contrast passes glottis, visible subglottic residue remains, absent patient response (aspiration)   Response to Aspiration absent response, silent aspiration   Swallowing Recommendations   Diet Consistency Recommendations NPO;consider alternative means of nutrition   Comment, Swallowing Recommendations Videofluoroscopic Swallow Study completed. Patient had silent aspiration with thin, mildly thick and significant amount with moderately thick d/t significant pyriform stasis being aspirated with pharyngeal constriction. Patient also had significant vallecular stasis and incomplete epiglottic inversion. Oral motor function impaired and oral phase impaired. Tongue base retraction was intact. Swallow response was delayed and epiglottic inversion was incomplete. Hyolaryngeal elevation was reduced and hyolaryngeal excursion was reduced. Recommend diet of NPO with alternative feeding. SLP will trial pharyngeal ex program and repeat VFSS in 4-6 weeks or pending pt progress.   General Therapy Interventions   Planned Therapy Interventions Dysphagia Treatment   Dysphagia treatment Oropharyngeal exercise training   Clinical Impression   Criteria for Skilled Therapeutic Interventions Met (SLP Eval) Yes, treatment  indicated   SLP Diagnosis oropharyngeal dysphagia   Clinical Impression Comments Severe oropharyngeal dysphagia with high aspiration for all consistencies. Will trial pharyngeal ex program and repeat VFSS in 4-6 weeks or pending pt progress.   SLP Discharge Planning   SLP Discharge Recommendation Transitional Care Facility   SLP Rationale for DC Rec Below baseline for swallowing.   SLP Brief overview of current status  Recommend NPO status with temporary alternate route for nutrition/hydration. Essential medications crushed per MD. Recommend VFSS to further assess oropharyngeal swallow function. Please continue excellent oral cares. Patient may have limited single ice chips (1-2 per hour) as tolerated for comfort. Encourage patient to cough up phlegm.    Total Evaluation Time   Total Evaluation Time (Minutes) 25   SLP Goals   Therapy Frequency (SLP Eval) daily   SLP Predicted Duration/Target Date for Goal Attainment 07/29/22   SLP Goals SLP Goal 1   SLP: Goal 1 Pt will independently learn pharyngeal ex program to decrease vallecular and pyriform stasis.

## 2022-07-21 NOTE — PROGRESS NOTES
"Orthopedic Progress Note      Assessment: 5 Days Post-Op  S/P Procedure(s):  HEMIARTHROPLASTY, HIP, BIPOLAR     Plan:   - Continue PT/OT  - Weightbearing status: WBAT , posterior hip precautions   - Pain Management   - Anticoagulation:  PO BID in addition to SCDs, angeles stockings and early ambulation.  - No dressing change needed unless saturated  - Discharge planning: patient is stable to discharge from orthopedics perspective pending TCU placement, PT/OT progression, pain control, and medical clearance   - Outpatient follow-up with Dr. Matos in 2 weeks for wound check    Subjective:  Pain: minimal  Nausea, Vomiting:  No  Lightheadedness, Dizziness:  No  Neuro:  Patient denies new onset numbness or paresthesias    Patient reports feeling well. Patient laying in bed and does not appear to be in pain. Tolerated exam without visible pain cues.     Objective:  /64 (BP Location: Right arm)   Pulse 62   Temp 97.7  F (36.5  C) (Oral)   Resp 18   Ht 1.727 m (5' 8\")   Wt 62.1 kg (137 lb)   SpO2 92%   BMI 20.83 kg/m    The patient is A&Ox3. Appears comfortable.   Sensation is intact.  PROM Dorsiflexion and plantar flexion is intact without pain. Wiggle toes intact  Dorsalis pedis pulse intact.  Calves are soft and non-tender. Negative Noam's.  The incision is covered. Dressing C/D/I.    No drain     Pertinent Labs   Lab Results: personally reviewed.   No results found for: INR, PROTIME  Lab Results   Component Value Date    WBC 7.7 07/21/2022    HGB 11.5 (L) 07/21/2022    HCT 34.8 (L) 07/21/2022    MCV 93 07/21/2022     07/21/2022     Lab Results   Component Value Date     07/21/2022    CO2 30 07/21/2022         Report completed by:  Zoraida Dela Cruz PA-C, ERLIN  July 21, 2022        "

## 2022-07-21 NOTE — PROGRESS NOTES
Palliative medicine consult received; left message with daughter Ruchi that Palliative can meet with family for family meeting/goals of care discussion at 1030 am Friday at bedside; palliative department number given ([phone]).  Awaiting word from RN supervisor on whether Ruchi, her mother and brother could meet collectively or if one family member would need to be present via telephone.    Discussed case with Dr Rose as well.    Jyothi Lopez MD  [facility],  Palliative Medicine Service  [phone] department number  Can page via [employer]

## 2022-07-21 NOTE — PROGRESS NOTES
[facility]    Medicine Progress Note - Hospitalist Service    Date of Admission:  7/15/2022    Assessment & Plan            88-year-old male with past medical history of Parkinson's disease was brought to ED for evaluation of left hip pain after mechanical fall, found to have left hip fracture, underwent surgery and admitted for further management     1.Mechanical fall and sustained displaced left femur neck fracture;  - On 7/16, s/p left unipolar hip hemiarthroplasty by Dr. Matos  -Postoperative care, DVT prophylaxis per orthopedic team asa bid  -Added scheduled Tylenol, try to avoid narcotics given his age and delirium  - PT OT  -will need TCU     2.Acute blood loss, postoperative;  - Operative note reported  mL  - Hemoglobin fairly stable.  Hemoglobin 11.5 on 7/21     3.Acute metabolic encephalopathy;  -Likely due to narcotic medication, different surroundings  -  delirium protocol  -was on 1:1 til late on 7/19  -checked head ct-neg  -psych consult-apprecaited  -Seroquel-was used and then stopped with more slurred speech     -speech eval for swallow--video aspirated everything--see below      4.Aspiration risks  -did poorly on study today  -I did talk to family, wife, son, daughter about results and risks of GT tube--they want to think about it  -pall care consult to help here goals of care  -keep npo except meds and ivf  -family to decide on FT     5.Left upper and lower extremity scattered superficial bruises due to fall;     6.History of colon s/p post surgery and colostomy bag;  - Patient's daughter reported on remission and taking daily MiraLAX, ordered.  - Continue colostomy bag care--has had small output     7.History of parkinsonism; with dementia--retired ([employer])  - Continue home medications  -MRI done, no cva     8.Leukocytosis on admission; likely stress.  Resolved  - On admission, no reported recent febrile illness, respiratory/GI/  symptoms.    9.Hypokalemia  -replace    10.Moderate malnutrition  -now failed swallow--family trying to decide on FT     11.DVT prophylaxis; per orthopedic team, they started patient on twice daily aspirin.       12.GI prophylaxis; continue home famotidine twice daily for GI prophylaxis while on twice daily aspirin     13.Code-Full code- verified with wife --again talked about this with wife, daughter and son today, 7/21    Goals of Care--pall care consulted    I had long meeting with wife, daughter and son on phone--at 1400  -went over how poorly swallow study went  -went over risks/benefits FT  -went over progression of his Parkinson, risk/mortality hip fx  -family agree pall care consult  -family to discuss more with each other about code status/FT and will let me know       Addendum--at 1445  Family let me know change code status--DNR/DNI AND NO FEEDING TUBE  Pall care to see tomorrow--family hopeful that hospital will make exception for thie pall care meeting to allow son, daughter and wife here together for making these major decisions--I asked charge RN to start process         Diet: Diet  NPO for Medical/Clinical Reasons Except for: Meds    DVT Prophylaxis: Anti-embolisim stockings (TEDs) and asa  Kevin Catheter: Not present  Central Lines: None  Cardiac Monitoring: None  Code Status: Full Code      Disposition Plan      Expected Discharge Date: 07/22/2022    Discharge Delays: Placement - TCU    Discharge Comments: video swallow 7/21-if fails would need FT and then would be here days  getting mri head        The patient's care was discussed with the Bedside Nurse, Care Coordinator/, Patient and Patient's Family.    Radha Rose MD  Hospitalist Service  [facility]  Securely message with the Vocera Web Console (learn more here)  Text page via [employer] Paging/Directory         Clinically Significant Risk Factors Present on Admission                       ______________________________________________________________________    Interval History   He was sleepy this am  No new complaints      Data reviewed today: I reviewed all medications, new labs and imaging results over the last 24 hours. I personally reviewed no images or EKG's today.    Physical Exam   Vital Signs: Temp: 97.7  F (36.5  C) Temp src: Oral BP: 112/64 Pulse: 62   Resp: 18 SpO2: 92 % O2 Device: None (Room air)    Weight: 137 lbs 0 oz  Constitutional: awake, fatigued, alert, cooperative, no apparent distress and appears older than stated age  ENT: a lot of secretions  Respiratory: No increased work of breathing, good air exchange, clear to auscultation bilaterally, no crackles or wheezing  Cardiovascular: Normal apical impulse, regular rate and rhythm, normal S1 and S2, no S3 or S4, and no murmur noted  GI: normal bowel sounds, soft, non-distended, non-tender and ostomy  Skin: no bruising or bleeding  Musculoskeletal: no lower extremity pitting edema present  Neurologic: Mental Status Exam:  Level of Alertness:   awake  Language:  abnormal - slurred speech having issues with secretions  Neuropsychiatric: Affect: flat  Motor bradykinesia    Data   Recent Labs   Lab 07/21/22  0631 07/20/22  0716 07/19/22  0734 07/17/22  1137 07/16/22  1040 07/15/22  2132   WBC 7.7  --   --   --  8.4 14.8*   HGB 11.5* 10.5* 11.7*   < > 13.3 14.5   MCV 93  --   --   --  93 94     --   --   --  154 193    142 141   < > 141 143   POTASSIUM 3.3* 3.5 3.7   < > 3.8 4.2   CHLORIDE 107 108* 105   < > 105 105   CO2 30 28 29   < > 28 27   BUN 17 17 15   < > 26 33*   CR 0.76 0.77 0.74   < > 0.98 1.26   ANIONGAP 6 6 7   < > 8 11   SELINA 8.5 8.4* 8.6   < > 8.6 9.4    97 99   < > 110 103   ALBUMIN  --   --   --   --   --  3.8   PROTTOTAL  --   --   --   --   --  7.1   BILITOTAL  --   --   --   --   --  1.5*   ALKPHOS  --   --   --   --   --  96   ALT  --   --   --   --   --  <9   AST  --   --   --   --   --  28     < > = values in this interval not displayed.     Recent Results (from the past 24 hour(s))   MR Brain w/o Contrast    Narrative    EXAM: MR BRAIN W/O CONTRAST  LOCATION: [facility]  DATE/TIME: 7/20/2022 5:31 PM    INDICATION: 88 y o man with parkinson, demenita, s p hip fx, post op some slurring words, delirium, make sure no cva, ct neg  COMPARISON: CT head 07/19/2022  TECHNIQUE: Head MRI without IV contrast including diffusion weighted imaging, FLAIR and hemosiderin sensitive sequences.    FINDINGS:  INTRACRANIAL CONTENTS: No acute or subacute infarct. No mass, acute hemorrhage, or extra-axial fluid collections. Scattered nonspecific T2/FLAIR hyperintensities within the cerebral white matter most consistent with mild chronic microvascular ischemic   change. Moderate generalized cerebral atrophy. No hydrocephalus. Normal position of the cerebellar tonsils.     OTHER: Accounting for technique no additional abnormalities identified.      Impression    IMPRESSION:  1.  No acute intracranial process.  2.  Generalized brain atrophy and presumed microvascular ischemic changes as detailed above.   XR Video Swallow with SLP or OT    Narrative    EXAM: XR VIDEO SWALLOW WITH SLP OR OT  LOCATION: [facility]  DATE/TIME: 7/21/2022 11:00 AM    INDICATION: Difficulty swallowing. Failed bedside speech evaluation. History of Parkinson's disease.  COMPARISON: None.    TECHNIQUE: Routine swallow study with speech pathology using multiple barium thicknesses.    FINDINGS:   FLUOROSCOPIC TIME: 1.2 minutes  NUMBER OF IMAGES: 5 digital fluoroscopy cine clips.    Swallow study with Speech Pathology using multiple barium thicknesses.     Poor oral control the bolus with some loss over the base the tongue and piecemeal bolusing. Abnormal swallow reflex with posterior/inferior translocation the epiglottis. This results in aspiration of thin liquid consistency during consecutive swallows of    the straw, with no cough reflex at the cords and delayed tracheal cough. Prominent vallecular sinus stasis with all consistencies. Patient had additional aspiration episodes of aspiration of stasis.    Please see speech pathology report for further details the exam and recommendations.

## 2022-07-21 NOTE — PLAN OF CARE
Problem: Risk for Delirium  Goal: Optimal Coping  Outcome: Ongoing, Not Progressing  Goal: Improved Behavioral Control  Outcome: Ongoing, Not Progressing  Goal: Improved Attention and Thought Clarity  Outcome: Ongoing, Not Progressing  Goal: Improved Sleep  Outcome: Ongoing, Not Progressing     Problem: Behavior Regulation Impairment (Dementia Signs/Symptoms)  Goal: Improved Behavioral Control (Dementia Signs/Symptoms)  Outcome: Ongoing, Not Progressing     Problem: Cognitive Impairment (Dementia Signs/Symptoms)  Goal: Optimized Cognitive Function (Dementia Signs/Symptoms)  Outcome: Ongoing, Not Progressing     Problem: Sleep Disturbance (Dementia Signs/Symptoms)  Goal: Improved Sleep (Dementia Signs/Symptoms)  Outcome: Ongoing, Not Progressing     Problem: Adjustment to Surgery (Hip Arthroplasty)  Goal: Optimal Coping  Outcome: Ongoing, Not Progressing   Goal Outcome Evaluation:      Patient continues to be totally confused. Patient unable to take redirection at this time. Patient remains on 1:1 for safety.

## 2022-07-22 NOTE — PLAN OF CARE
"  Problem: Risk for Delirium  Goal: Optimal Coping  Outcome: Ongoing, Progressing  Goal: Improved Behavioral Control  Outcome: Ongoing, Progressing     Problem: Plan of Care - These are the overarching goals to be used throughout the patient stay.    Goal: Plan of Care Review/Shift Note  Description: The Plan of Care Review/Shift note should be completed every shift.  The Outcome Evaluation is a brief statement about your assessment that the patient is improving, declining, or no change.  This information will be displayed automatically on your shift note.  Outcome: Ongoing, Progressing  Flowsheets (Taken 7/21/2022 2710)  Overall Patient Progress: improving  Goal: Patient-Specific Goal (Individualized)  Description: You can add care plan individualizations to a care plan. Examples of Individualization might be:  \"Parent requests to be called daily at 9am for status\", \"I have a hard time hearing out of my right ear\", or \"Do not touch me to wake me up as it startles me\".  Outcome: Ongoing, Progressing  Goal: Absence of Hospital-Acquired Illness or Injury  Outcome: Ongoing, Progressing  Intervention: Identify and Manage Fall Risk  Recent Flowsheet Documentation  Taken 7/21/2022 1647 by Sylvie Richmond, RN  Safety Promotion/Fall Prevention:   activity supervised   bedside attendant   safety round/check completed   sitter at bedside   supervised activity   room organization consistent   toileting scheduled   room near nurse's station   nonskid shoes/slippers when out of bed   patient and family education   increase visualization of patient   lighting adjusted  Goal: Optimal Comfort and Wellbeing  Outcome: Ongoing, Progressing  Intervention: Monitor Pain and Promote Comfort  Recent Flowsheet Documentation  Taken 7/21/2022 1647 by Sylvie Richmond, RN  Pain Management Interventions:   medication offered but refused   distraction  Goal: Readiness for Transition of Care  Outcome: Ongoing, Progressing     Problem: Pain " Acute  Goal: Acceptable Pain Control and Functional Ability  Outcome: Ongoing, Progressing  Intervention: Develop Pain Management Plan  Recent Flowsheet Documentation  Taken 7/21/2022 1647 by Sylvie Richmond RN  Pain Management Interventions:   medication offered but refused   distraction     Problem: Behavior Regulation Impairment (Dementia Signs/Symptoms)  Goal: Improved Behavioral Control (Dementia Signs/Symptoms)  Outcome: Ongoing, Progressing  Flowsheets (Taken 7/21/2022 2345)  Mutually Determined Action Steps (Improved Behavioral Control): participates in diversional activity     Problem: Mood Impairment (Dementia Signs/Symptoms)  Goal: Improved Mood Symptoms (Dementia Signs/Symptoms)  Outcome: Ongoing, Progressing  Flowsheets (Taken 7/21/2022 2345)  Mutually Determined Action Steps (Improved Mood Symptoms): engages in recreational/leisure activities     Problem: Adjustment to Surgery (Hip Arthroplasty)  Goal: Optimal Coping  Outcome: Ongoing, Progressing     Problem: Bleeding (Hip Arthroplasty)  Goal: Absence of Bleeding  Outcome: Ongoing, Progressing     Problem: Fluid and Electrolyte Imbalance (Hip Arthroplasty)  Goal: Fluid and Electrolyte Balance  Outcome: Ongoing, Progressing     Problem: Functional Ability Impaired (Hip Arthroplasty)  Goal: Optimal Functional Ability  Outcome: Ongoing, Progressing     Problem: Pain (Hip Arthroplasty)  Goal: Acceptable Pain Control  Outcome: Ongoing, Progressing  Intervention: Prevent or Manage Pain  Recent Flowsheet Documentation  Taken 7/21/2022 1647 by Sylvie Richmond, RN  Pain Management Interventions:   medication offered but refused   distraction     Problem: Respiratory Compromise (Hip Arthroplasty)  Goal: Effective Oxygenation and Ventilation  Outcome: Ongoing, Progressing   Goal Outcome Evaluation:    Overall Patient Progress: improving    Reports moderate Left hip pain when asked, declined medication. Was able to go for a walk during shift with therapy with  recliner following. 1:1 sitter in place for safety d/t restlessness/impulsiveness. Less aggression today, seroquel & zyprexa were discontinued. Did well with distraction with coloring books. No BM in colostomy x2 days although patient has been NPO, is receiving IV fluids at 50 mL/hr. Palliative consult at 10:30 am tomorrow.

## 2022-07-22 NOTE — PLAN OF CARE
Occupational Therapy Discharge Summary    Reason for therapy discharge:    Change in medical status.    Progress towards therapy goal(s). See goals on Care Plan in [facility] electronic health record for goal details.  Goals partially met.  Barriers to achieving goals:   limited tolerance for therapy.    Therapy recommendation(s):    No further therapy is recommended. Pt is transitioning to [facility]     Goal Outcome Evaluation:      Kathryn Daniel OTR/TRINA

## 2022-07-22 NOTE — CONSULTS
"[facility]  Palliative Care Consultation Note    Patient: Kwan Yi  Date of Admission:  7/15/2022    Requesting Clinician / Team: Radha Rose MD/[facility] [place]  Reason for consult: \"87 yo with Parkinson's, hip fx this admit, failed swallow study, family trying to decide goals, code status, FT\"  Goals of care    Impression & Recommendations:  See below for counselling provided today.    Goals of care: transitioned to comfort focused goals of care and comfort care today.    Advanced care planning: has HCD on file--scanned for inclusion to EMR via [employer].  Code status: DNR/DNI.  Surrogate decision makers: agent wife Betty, first alternate son Mychal, second alternate daughter Ruchi.    Support: Lauryn sin (belonged to [employer]uryn in [facility] for many years), loves singing in choir.  - two children, two grandchildren, and wife Betty.    Symptoms:  Delirium and dementia with behavioral disturbance:  - on 1:1 staffing currently for safety.  - neuroleptic medications have been on hold.  - NO haloperidol due to parkinson's disease.  - if significant issues with increased agitation/terminal delirium would use seroquel as neuroleptic of choice.  - may need to allow use of lorazepam    Pain, due to recent L femur fracture and hemiarthroplasty  Stiffness due to PD can also contribute to pain.  - schedule tylenol TID.  - prn opioid Rx ordered (morphine, GFR adequate).  - ice/heat prn.  - continue carbidopa/levodopa until unable to swallow.    Secretions and dysphagia to all textures with silent aspiration  Risk for developing dyspnea and air hunger  - guaifenesin for now while able to take PO  - diet for comfort, discussed at length with family who are in alignment.  - no desire to place FT as high likelihood he would pull it out and they would not want to restrain Kwan to preent FT scontinuation.  - opioids should be scheduled (would start with 3-5mg SL roxanol q6 hours with " additional prn as ordered) IF he develops increased work of breathing.  - discussed with Jesus's family need to monitor for nonverbal signs/symptoms of pain and treat findings of brow furrowing, forehead wrinkling, grimacing, tachypnea or accessory muscle use with opioid medications, repositioning etc.    These recommendations have been discussed with Dr Rose, and Jesus's family.      Thank you for the opportunity to participate in the care of this patient and family. Our team: will continue to follow.     During regular M-F work hours -- if you are not sure who specifically to contact -- please contact us by calling us directly at the Palliative Care Main Line [phone]    After regular work hours and on weekends/holidays, you can leave a message at [phone]      Assessments:  Kwan Yi is a 88 year old male with Parkinson's disease (onset 2013, dx 2018) with dementia,rectal cancer (surgery 2009, had met to liver tx w stereotactic radiotherapy in 2021 w/o evidence recurrence), presenting after a mechanical fall with L hip fracture (L hip hemiarthroplasty 7/16/22) and hospital course complicated by delirium and found to have silent aspiration with all textures, severe OP dysphagia w high aspiration with all consistencies.     Today, the patient was seen for:  Initial visit with palliative medicine, family care conference, goals of care discussion.    Prognosis, Goals, & Planning:      Functional Status just prior to hospitalization: 3 (Capable of only limited self-care; needs help with ADLs; in bed/chair >50% of waking hours)      Prognosis, Goals, and/or Advance Care Planning were addressed today: Yes        Summary/Comments: discussed prognosis could be days-short weeks with his dysphagia if he has an aspiration event, but could be longer such as weeks-months.    Family very clear comfort focused goals of treatment are desired.  Completed POLST (DNR/comfort) and original given to wife Betty,  copy placed in chart.      Patient's decision making preferences: unable to assess          Patient has decision-making capacity today for complex decisions: No            I have concerns about the patient/family's health literacy today: No           Patient has a completed Health Care Directive: Yes, and on file.  Placed copy in chart.    Code status: No CPR / No Intubation    Coping, Meaning, & Spirituality:   Mood, coping, and/or meaning in the context of serious illness were addressed today: Yes  Summary/Comments: Jesus has found meaning from his spiritual life, singing/music (very active in [employer] choir), teaching, socializing, active life of the mind/problem solving.  He loved to travel, spend time outdoors/camp and play golf.  As his cognitive abilities have waned, wife Betty notes he enjoys watching television.  Throughout life, Jesus has dealt with a variety of situations using humor and distraction; he hadn't had conversations about end of life preferences much at all but did complete a health care directive.  He and Betty met when she was in high school and he was in college; she studied mathematics and their children both double majored in math and physics.    Social:     Living situation: lives in [facility] with wife; had been in charge of his own medications.    Key family / caregivers: wife Betty, son Mychal, daughter Ruchi and their spouses.  Mychal has two daughters.    Occupational history: retired college .    Substance use history: not discussed    Financial concerns: not discussed    Current in-home services: just started with [facility] for assistance with bathing as Jesus had been resistant to that prior to his fall and subsequent hospitalization.    History of Present Illness:  History gathered today from: patient, family/loved ones, medical chart, medical team members, outside records including Care Everywhere, health care directive/s    Kwan Yi is a 88 year old male with  Parkinson's disease (onset 2013, dx 2018) with dementia,rectal cancer (surgery 2009, had met to liver tx w stereotactic radiotherapy in 2021 w/o evidence recurrence), presenting after a mechanical fall with L hip fracture (L hip hemiarthroplasty 7/16/22) and hospital course complicated by delirium and found to have silent aspiration with all textures, severe OP dysphagia w high aspiration with all consistencies.     I introduced Palliative medicine as a specialty that works to help patients with serious illness live as well as possible, and manage symptoms/side effects of the illness or its treatment.  Palliative medicine also works to provide an extra layer of support to patients experiencing serious illness and their families.  Our specialty also helps with advance care planning (making health care directives and helping a person receive care that is in keeping with their individual hopes and values).     Mychal Hernández and Ruchi were able to affirm understanding that Kwan's swallow study was very abnormal.  They noted they didn't want to have Kwan undergo placement of a FT due to concerns he would pull it out.  We reviewed that quality of life for individuals with neurodegenerative disorders like dementia is not improved nor is life expectancy prolonged by artificial nutrition.  Reviewed late Parkinson's disease and end of life often occurring from sequellae of falls, aspiration/pneumonia.    Prior to admission, Kwan and Betty moved into (3 yrs ago) [facility] facility, independent living status, and just recently they increased Kwan's services to have a little home care at home; Betty had supervised Jesus's medications.    Since L hemiarthroplasty, Betty notes she would not be able to care for Jesus at home for end of life.    Discussed goals of treatment with Jesus's wife and children; given progressive nature of his Parkinson's disease and dementia, and cognitive impairments limiting  success with speech therapy.  They wish to transition to comfort-focused goals of care and would want to involve hospice.  Reviewed prognosis, and that life expectancy could be short days should Jesus have difficulty with significant aspiration when oral intake resumes.  Family wishes to allow Jesus to eat for comfort and should he get pneumonia, they would want to treat for comfort.  Additionally reviewed that comfort care would involve continuing his oral carbidopa-levodopa until he is unable to swallow.  Family would not wish to use IV or oral antibiotics, or IV fluids, and they would want to forego lab work unless absolutely needed.    Family in alignment that they wish to focus on comfort.    During my visit, Jesus denies pain or symptoms of concern.  He's not oriented except to self.    Key Palliative Symptom Data:  # Pain severity the last 12 hours: low  # Dyspnea severity the last 12 hours: low  # Nausea severity the last 12 hours: low  # Anxiety severity the last 12 hours: low         ROS:  Comprehensive ROS is unable to be completed as Jesus is not a reliable historian due to advanced dementia.     Past Medical History:  History reviewed. No pertinent past medical history.     Past Surgical History:  Past Surgical History:   Procedure Laterality Date     OPEN REDUCTION INTERNAL FIXATION HIP BIPOLAR Left 7/16/2022    Procedure: HEMIARTHROPLASTY, HIP, BIPOLAR;  Surgeon: Gerardo Matos MD;  Location: [facility] Main OR         Family History:   Dementia Birth Father        Heart disorder Birth Father        Cancer, Other Birth Mother    colon   Cataract Birth Mother        Diabetes, Type II Paternal Grandmother        Glaucoma Negative Family History        Macular Degeneration Negative Family History          Allergies:  No Known Allergies     Medications:  I have reviewed this patient's medication profile and medications from this hospitalization.   24-hr MME: 0mg    PERTINENT PHYSICAL EXAMINATION:  Vital  "Signs: Blood pressure 123/62, pulse 62, temperature 98.2  F (36.8  C), temperature source Oral, resp. rate 20, height 1.727 m (5' 8\"), weight 62.1 kg (137 lb), SpO2 90 %.   GENERAL: Alert male, in recliner.  Pleasant but confused.  Talkative.   SKIN: Warm and dry   HEENT: Normocephalic, anicteric sclera, moist mucous membranes with temporal muscle wasting.  LUNGS: Clear to auscultation anterolaterally; non-labored with audible secretions/rhonchi.  CARDIAC: RRR, normal s1/s2, w/o m/r/g   ABDOMINAL: BS (+), soft, non distended, colostomy bud pink.  MUSKL: no gross joint deformities   EXTREMITIES: No edema or cyanosis, pulses 2+ and symmetrical  NEUROLOGIC: oriented to self only.  Reduced facial expressiveness.  Hypophonia. Needs help sitting forward. difficulty with word finding.  PSYCH: affect full       Data reviewed:  Recent imaging reviewed, my comments on pertinents:   VFSS 7/21/22:  Swallow study with Speech Pathology using multiple barium thicknesses.     Poor oral control the bolus with some loss over the base the tongue and piecemeal bolusing. Abnormal swallow reflex with posterior/inferior translocation the epiglottis. This results in aspiration of thin liquid consistency during consecutive swallows of    the straw, with no cough reflex at the cords and delayed tracheal cough. Prominent vallecular sinus stasis with all consistencies. Patient had additional aspiration episodes of aspiration of stasis.     MRI brain 7/20/22:  IMPRESSION:   1.  No acute intracranial process.   2.  Generalized brain atrophy and presumed microvascular ischemic changes as detailed above.    XR pelvis 7/16/22:  IMPRESSION:     Acute mildly impacted nondisplaced subcapital fracture of the left femoral neck.     No acute fracture in the pelvis or hips otherwise. Generalized demineralization. No concerning bone lesion.     Normal alignment of the hip joints with minimal degenerative arthritic changes. Intact pelvic ring. Small " radiodense foreign body projects over the anterior aspect of the left pelvis/hip.     Moderate advanced degenerative changes in the lower lumbar spine.     The remainder of the left femur is normal. No other fracture or bone lesion. Normal knee joint alignment. No significant joint effusion. Minimal degenerative changes in the lateral compartment.    Recent lab data reviewed, my comments on pertinents:   Last Comprehensive Metabolic Panel:  Sodium   Date Value Ref Range Status   07/22/2022 141 136 - 145 mmol/L Final     Potassium   Date Value Ref Range Status   07/22/2022 3.6 3.5 - 5.0 mmol/L Final     Chloride   Date Value Ref Range Status   07/22/2022 109 (H) 98 - 107 mmol/L Final     Carbon Dioxide (CO2)   Date Value Ref Range Status   07/22/2022 27 22 - 31 mmol/L Final     Anion Gap   Date Value Ref Range Status   07/22/2022 5 5 - 18 mmol/L Final     Glucose   Date Value Ref Range Status   07/22/2022 102 70 - 125 mg/dL Final     Urea Nitrogen   Date Value Ref Range Status   07/22/2022 15 8 - 28 mg/dL Final     Creatinine   Date Value Ref Range Status   07/22/2022 0.71 0.70 - 1.30 mg/dL Final     GFR Estimate   Date Value Ref Range Status   07/22/2022 88 >60 mL/min/1.73m2 Final     Comment:     Effective December 21, 2021 eGFRcr in adults is calculated using the 2021 CKD-EPI creatinine equation which includes age and gender (Iglesia et al., NEJ, DOI: 10.1056/NSLMaw5703470)     Calcium   Date Value Ref Range Status   07/22/2022 8.3 (L) 8.5 - 10.5 mg/dL Final     Lab Results   Component Value Date    WBC 7.7 07/21/2022     Lab Results   Component Value Date    RBC 3.74 07/21/2022     Lab Results   Component Value Date    HGB 11.1 07/22/2022     Lab Results   Component Value Date    HCT 34.8 07/21/2022     No components found for: MCT  Lab Results   Component Value Date    MCV 93 07/21/2022     Lab Results   Component Value Date    MCH 30.7 07/21/2022     Lab Results   Component Value Date    MCHC 33.0 07/21/2022      Lab Results   Component Value Date    RDW 12.8 07/21/2022     Lab Results   Component Value Date     07/21/2022     Lab Results   Component Value Date    AST 28 07/15/2022     Lab Results   Component Value Date    ALT <9 07/15/2022     No results found for: BILICONJ   Lab Results   Component Value Date    BILITOTAL 1.5 07/15/2022     Lab Results   Component Value Date    ALBUMIN 3.8 07/15/2022     Lab Results   Component Value Date    PROTTOTAL 7.1 07/15/2022      Lab Results   Component Value Date    ALKPHOS 96 07/15/2022     Jyothi Lopez MD  [facility],  Palliative Medicine Service  [phone] department number  Can page via [employer]

## 2022-07-22 NOTE — PLAN OF CARE
Physical Therapy Discharge Summary    Reason for therapy discharge:    Change in medical status.    Progress towards therapy goal(s). See goals on Care Plan in [facility] electronic health record for goal details.  Goals partially met.  Barriers to achieving goals:   pt transitioning to comfort care.    Jocelyn Sims, PT  7/22/2022

## 2022-07-22 NOTE — PROGRESS NOTES
[facility]    Medicine Progress Note - Hospitalist Service    Date of Admission:  7/15/2022    Assessment & Plan            88-year-old male with past medical history of Parkinson's disease was brought to ED for evaluation of left hip pain after mechanical fall, found to have left hip fracture, underwent surgery and admitted for further management     1.Mechanical fall and sustained displaced left femur neck fracture;  - On 7/16, s/p left unipolar hip hemiarthroplasty by Dr. Matos  -Postoperative care, DVT prophylaxis per orthopedic team asa bid  -Added scheduled Tylenol, try to avoid narcotics given his age and delirium  - PT OT  -?TCU, now depends on goals--see below     2.Acute blood loss, postoperative;  - Operative note reported  mL  - Hemoglobin fairly stable.  Hemoglobin 11.5 on 7/21     3.Acute metabolic encephalopathy;  -Likely due to narcotic medication, different surroundings  -  delirium protocol  -was on 1:1 til late on 7/19  -checked head ct-neg  -psych consult-apprecaited  -Seroquel-was used and then stopped with more slurred speech     -speech eval for swallow--video aspirated everything--see below        4.Aspiration risks--very high  -did poorly on study  -after long discussion on 7/21 family decided NOT to do feeding tube  -today get pall care consult  -on ivf and npo except meds     5.Left upper and lower extremity scattered superficial bruises due to fall;     6.History of colon s/p post surgery and colostomy bag;  - Patient's daughter reported on remission and taking daily MiraLAX, ordered.  - Continue colostomy bag care--has had small output     7.History of parkinsonism; with dementia--retired ([employer])  - Continue home medications  -MRI done, no cva     8.Leukocytosis on admission; likely stress.  Resolved  - On admission, no reported recent febrile illness, respiratory/GI/ symptoms.     9.Hypokalemia  -replaced     10.Moderate  malnutrition  -now failed swallow--no FT  -goals of care today meeting     11.DVT prophylaxis; per orthopedic team, they started patient on twice daily aspirin.       12.GI prophylaxis; continue home famotidine twice daily for GI prophylaxis while on twice daily aspirin     13.Code-DNR as of 7/21     Goals of Care--pall care consulted for today    Addendum--now comfort focused, no escalation of care, no icu, will allow diet prn, comfort care            Diet: Diet  NPO for Medical/Clinical Reasons Except for: Meds    DVT Prophylaxis: asa per ortho  Kevin Catheter: Not present  Central Lines: None  Cardiac Monitoring: None  Code Status: No CPR- Do NOT Intubate      Disposition Plan      Expected Discharge Date: 07/25/2022    Discharge Delays: Placement - TCU  Destination: other (comment) (TCU)  Discharge Comments: video swallow 7/21-if fails would need FT and then would be here days  getting mri head        The patient's care was discussed with the Bedside Nurse and Patient. Updated family at bedside 1230    Radha Rose MD  Hospitalist Service  [facility]  Securely message with the Vocera Web Console (learn more here)  Text page via [employer] Paging/Directory         Clinically Significant Risk Factors Present on Admission                      ______________________________________________________________________    Interval History   He did not sleep well per 1:1  Would wake up with cough  For me wakes confused , spoke about something to do with physics     Data reviewed today: I reviewed all medications, new labs and imaging results over the last 24 hours. I personally reviewed no images or EKG's today.    Physical Exam   Vital Signs: Temp: 98.2  F (36.8  C) Temp src: Oral BP: 123/62 Pulse: 62   Resp: 20 SpO2: 90 % O2 Device: None (Room air)    Weight: 137 lbs 0 oz  Constitutional: awake, fatigued, alert, uncooperative and no apparent distress  Respiratory: no increased work of breathing,  however can hear upper airway secretions, good air exchange, no retractions and diminished breath sounds right base and left base  Cardiovascular: Normal apical impulse, regular rate and rhythm, normal S1 and S2, no S3 or S4, and no murmur noted  GI: hypoactive bowel sounds, soft, non-distended, non-tender, ostomy had small amount of stool   Skin: ecchymosis scattered  Musculoskeletal: no lower extremity pitting edema present  Neurologic: Mental Status Exam:  Level of Alertness:   lethargic  Neuropsychiatric: General: restless  Affect: flat  Motor bradykinesia    Data   Recent Labs   Lab 07/21/22  1715 07/21/22  1543 07/21/22  0631 07/20/22  0716 07/19/22  0734 07/17/22  1137 07/16/22  1040 07/15/22  2132   WBC  --   --  7.7  --   --   --  8.4 14.8*   HGB  --   --  11.5* 10.5* 11.7*   < > 13.3 14.5   MCV  --   --  93  --   --   --  93 94   PLT  --   --  171  --   --   --  154 193   NA  --   --  143 142 141   < > 141 143   POTASSIUM 4.0 4.1 3.3* 3.5 3.7   < > 3.8 4.2   CHLORIDE  --   --  107 108* 105   < > 105 105   CO2  --   --  30 28 29   < > 28 27   BUN  --   --  17 17 15   < > 26 33*   CR  --   --  0.76 0.77 0.74   < > 0.98 1.26   ANIONGAP  --   --  6 6 7   < > 8 11   SELINA  --   --  8.5 8.4* 8.6   < > 8.6 9.4   GLC  --   --  107 97 99   < > 110 103   ALBUMIN  --   --   --   --   --   --   --  3.8   PROTTOTAL  --   --   --   --   --   --   --  7.1   BILITOTAL  --   --   --   --   --   --   --  1.5*   ALKPHOS  --   --   --   --   --   --   --  96   ALT  --   --   --   --   --   --   --  <9   AST  --   --   --   --   --   --   --  28    < > = values in this interval not displayed.     Recent Results (from the past 24 hour(s))   XR Video Swallow with SLP or OT    Narrative    EXAM: XR VIDEO SWALLOW WITH SLP OR OT  LOCATION: [facility]  DATE/TIME: 7/21/2022 11:00 AM    INDICATION: Difficulty swallowing. Failed bedside speech evaluation. History of Parkinson's disease.  COMPARISON:  None.    TECHNIQUE: Routine swallow study with speech pathology using multiple barium thicknesses.    FINDINGS:   FLUOROSCOPIC TIME: 1.2 minutes  NUMBER OF IMAGES: 5 digital fluoroscopy cine clips.    Swallow study with Speech Pathology using multiple barium thicknesses.     Poor oral control the bolus with some loss over the base the tongue and piecemeal bolusing. Abnormal swallow reflex with posterior/inferior translocation the epiglottis. This results in aspiration of thin liquid consistency during consecutive swallows of   the straw, with no cough reflex at the cords and delayed tracheal cough. Prominent vallecular sinus stasis with all consistencies. Patient had additional aspiration episodes of aspiration of stasis.    Please see speech pathology report for further details the exam and recommendations.

## 2022-07-22 NOTE — PROGRESS NOTES
Care Management Follow Up    Length of Stay (days): 7    Expected Discharge Date: 07/22/2022     Concerns to be Addressed: discharge planning       Patient plan of care discussed at interdisciplinary rounds: Yes    Anticipated Discharge Disposition: Transitional Care     Anticipated Discharge Services: Other (see comment) (therapy services)    Anticipated Discharge DME: None    Patient/family educated on Medicare website which has current facility and service quality ratings: yes    Education Provided on the Discharge Plan:  Yes    Patient/Family in Agreement with the Plan: yes    Referrals Placed by CM/SW: Post Acute Facilities    Private pay costs discussed: N/A    Additional Information: SW following up on TCU referrals.  SW left messages at both [facility] and [facility] inquiring as to status of referrals and requesting return calls.  SW spoke with [facility] at [facility] and will get back to .  Shawnee later declined pt in [facility] due to behaviors and no beds.  Transportation TBD.  Needs PAS.      ALEXANDRIA Nova, ROBB 07/22/22 6:37 PM

## 2022-07-22 NOTE — PLAN OF CARE
Problem: Risk for Delirium  Goal: Improved Attention and Thought Clarity  Outcome: Ongoing, Progressing  Patient remains confused, but has not exhibited aggressiveness on this shift.  1:1 trialed off with MD approval.  Has not attempted to get out of bed unassisted or shown other unsafe behaviors.  Alarms in place, frequent rounding for patient safety.    Problem: Palliative Care  Goal: Enhanced Quality of Life  Outcome: Ongoing, Progressing  Palliative meeting this shift - comfort cares initiated.  Patient overall appears comfortable - Tylenol given.    Celia Hinojosa RN

## 2022-07-22 NOTE — PROGRESS NOTES
"Orthopedic Progress Note      Assessment: 6 Days Post-Op  S/P Procedure(s):  HEMIARTHROPLASTY, HIP, BIPOLAR     Plan:   - Continue PT/OT  - Weightbearing status: WBAT , posterior hip precautions   - Pain Management   - Anticoagulation:  PO BID in addition to SCDs, angeles stockings and early ambulation.  - No dressing change needed unless saturated  - Discharge planning: patient is stable to discharge from orthopedics perspective pending TCU placement, PT/OT progression, pain control, and medical clearance   - Outpatient follow-up with Dr. Matos in 2 weeks for wound check    Subjective:  Pain: minimal  Nausea, Vomiting:  No  Lightheadedness, Dizziness:  No  Neuro:  Patient denies new onset numbness or paresthesias    Patient reports feeling well. Patient sitting on the recliner and does not appear to be in pain. Tolerated exam without visible pain cues.     Objective:  /62 (BP Location: Right arm)   Pulse 62   Temp 98.2  F (36.8  C) (Oral)   Resp 20   Ht 1.727 m (5' 8\")   Wt 62.1 kg (137 lb)   SpO2 90%   BMI 20.83 kg/m    The patient is A&Ox3. Appears comfortable.   Sensation is intact.  PROM Dorsiflexion and plantar flexion is intact without pain. Wiggle toes intact  Dorsalis pedis pulse intact.  Calves are soft and non-tender. Negative Noam's.  The incision is covered. Dressing C/D/I.    No drain     Pertinent Labs   Lab Results: personally reviewed.   No results found for: INR, PROTIME  Lab Results   Component Value Date    WBC 7.7 07/21/2022    HGB 11.1 (L) 07/22/2022    HCT 34.8 (L) 07/21/2022    MCV 93 07/21/2022     07/21/2022     Lab Results   Component Value Date     07/22/2022    CO2 27 07/22/2022         Report completed by:  Zoraida Dela Cruz PA-C, ERLIN  July 22, 2022          "

## 2022-07-22 NOTE — PLAN OF CARE
Problem: Risk for Delirium  Goal: Optimal Coping  Outcome: Ongoing, Progressing  Goal: Improved Behavioral Control  Outcome: Ongoing, Progressing  Goal: Improved Attention and Thought Clarity  Outcome: Ongoing, Progressing     Problem: Pain Acute  Goal: Acceptable Pain Control and Functional Ability  Outcome: Ongoing, Progressing     Problem: Behavior Regulation Impairment (Dementia Signs/Symptoms)  Goal: Improved Behavioral Control (Dementia Signs/Symptoms)  Outcome: Ongoing, Progressing   Goal Outcome Evaluation:      Patient somewhat improved on his behaviors. However, patient remains very confused and impulsive. Patient able to take redirection at times.

## 2022-07-22 NOTE — PROGRESS NOTES
SPIRITUAL HEALTH SERVICES Progress Note       attempted to visit patient due to consult order. Jesus was sleeping and did not respond to voice at this time. A  will continue to visit as able or per request by patient/family/staff.      Zeb Aviles MDiv, [facility]  Lead Staff , [facility]  [phone]

## 2022-07-23 NOTE — PROGRESS NOTES
[facility]    Medicine Progress Note - Hospitalist Service       Date of Admission:  7/15/2022  Active Problems:    Hip fracture, left, closed, initial encounter (H)     Assessment & Plan          88-year-old male with past medical history of Parkinson's disease, dementia was brought to ED for evaluation of left hip pain after mechanical fall, found to have left hip fracture, underwent surgery and admitted for further management.  Patient's hospital course has been complicated by encephalopathy and acute on likely chronic dysphagia.  He is now on comfort care     1.Mechanical fall and sustained displaced left femur neck fracture;  - On 7/16, s/p left unipolar hip hemiarthroplasty by Dr. Matos  -Postoperative care, DVT prophylaxis per orthopedic team asa bid  -Added scheduled Tylenol, try to avoid narcotics given his age and delirium  Now on comfort care, discontinue PT OT.  Pain controlled.       2.Acute blood loss, postoperative;  - Operative note reported  mL  - Hemoglobin fairly stable.  Hemoglobin 11.5 on 7/21     3.Acute metabolic encephalopathy with underlying dementia;  -Likely due to narcotic medication, different surroundings, underlying dementia/Parkinson's  -  delirium protocol  -was on 1:1 til late on 7/19  -checked head ct-neg  -psych consult-apprecaited  -Seroquel-was scheduled and then stopped with more slurred speech  Slept poorly last night, will schedule Seroquel at night only, effective as needed this morning.  Continue melatonin  Added lorazepam as needed per palliative care recommendation     4.acute on likely chronic dysphagia   aspiration risks--very high  -did poorly on follow evaluation  -after long discussion on 7/21 family decided NOT to do feeding tube.  Suspect chronic.  Now on comfort care, diet as tolerated.     6.History of colon s/p post surgery and colostomy bag;  - Patient's daughter reported on remission and taking daily MiraLAX, ordered.  - Continue  colostomy bag care--has had small output.     7.History of parkinsonism; with dementia--retired ([employer])  -Continue Sinemet  -MRI done, no cva     8.Leukocytosis on admission; likely stress.  Resolved  - On admission, no reported recent febrile illness, respiratory/GI/ symptoms.     9.Hypokalemia  -replaced     10.Moderate malnutrition  -now failed swallow--no FT  Now on comfort care     11.DVT prophylaxis; per orthopedic team, they started patient on twice daily aspirin.  Discontinued, now on comfort care     Goals of care: DNR/DNI, family now wishes for comfort care and discharged with hospice     Diet: Diet  Regular Diet Adult    DVT Prophylaxis: None  Kevin Catheter: Not present  Central Lines: None  Code Status: No CPR- Do NOT Intubate      Disposition Plan      Expected Discharge Date: 07/25/2022    Discharge Delays: Comfort Care/Hospice  Placement - LTC  Destination: other (comment) (LTC w/hospice vs hospice home)  Discharge Comments: video swallow 7/21-if fails would need FT and then would be here days  getting mri head        The patient's care was discussed with the Bedside Nurse, Care Coordinator/, Patient and Patient's Family. for total time 35 minutes with greater than 50% of total time spent in counseling and coordination of care.    ABEBE GÓMEZ MD  Hospitalist Service  [facility]  Securely message with the Vocera Web Console (learn more here)  Text page via [employer] Paging/Directory        Clinically Significant Risk Factors Present on Admission                      ______________________________________________________________________    Interval History   Remainder of 12 point review of systems negative except as noted below    Subjective:  Patient mildly agitated last night but redirectable, given Seroquel this morning and slept much of the morning, awake this afternoon, conversing with family.  Denies left leg pain    Data  "reviewed today: I reviewed all medications, new labs and imaging results over the last 24 hours.     Physical Exam   Vital Signs: Temp: 98.2  F (36.8  C) Temp src: Oral BP: 132/66 Pulse: 65   Resp: 19 SpO2: 93 % O2 Device: None (Room air)    Weight: 137 lbs 0 oz  Physical Exam:  Temp:  [98.2  F (36.8  C)-98.5  F (36.9  C)] 98.2  F (36.8  C)  Pulse:  [65-81] 65  Resp:  [18-19] 19  BP: (132-135)/(66-75) 132/66  SpO2:  [93 %] 93 %    /66 (BP Location: Right arm)   Pulse 65   Temp 98.2  F (36.8  C) (Oral)   Resp 19   Ht 1.727 m (5' 8\")   Wt 62.1 kg (137 lb)   SpO2 93%   BMI 20.83 kg/m    General appearance: alert, appears stated age, cachectic, cooperative, no distress and slowed mentation  Head: Normocephalic, without obvious abnormality, atraumatic  Eyes: Clear conjuctiva  Neck: no JVD and supple, symmetrical, trachea midline  Lungs: diminished breath sounds bilaterally  Heart: regular rate and rhythm, S1, S2 normal, no murmur, click, rub or gallop  Abdomen: soft, non-tender; bowel sounds normal; no masses,  no organomegaly  Extremities: Noam's sign is negative, no sign of DVT  Skin: Bandaged left hip incision  Neurologic: Mental status: alertness: alert  Cranial nerves: III,IV,VI: extraocular muscles extra-ocular motions intact, VII: upper facial muscle function normal bilaterally, VII: lower facial muscle function normal bilaterally  Sensory: normal  Motor:grossly normal  Colostomy intact with brown stool        Data   Recent Labs   Lab 07/22/22  0709 07/21/22  1715 07/21/22  1543 07/21/22  0631 07/20/22  0716   WBC  --   --   --  7.7  --    HGB 11.1*  --   --  11.5* 10.5*   MCV  --   --   --  93  --    PLT  --   --   --  171  --      --   --  143 142   POTASSIUM 3.6 4.0 4.1 3.3* 3.5   CHLORIDE 109*  --   --  107 108*   CO2 27  --   --  30 28   BUN 15  --   --  17 17   CR 0.71  --   --  0.76 0.77   ANIONGAP 5  --   --  6 6   SELINA 8.3*  --   --  8.5 8.4*     --   --  107 97     No results " found for this or any previous visit (from the past 24 hour(s)).

## 2022-07-23 NOTE — PLAN OF CARE
Goal Outcome Evaluation:  Patient has been restless since 7pm last evening.  Has attempted to get out of bed numerous times and setting off bed alarm, not making it very far but will put legs over side of bed.  Close to nurses desk and able to get to room quickly.  Gave HS medications and did cough when given, finished medications with apple sauce.  Now this AM restless in bed, does not understand he needs to stay in bed or situation, time and place.  Did give zyprexa last evening with no change in restlessness. Will continue to monitor, bed alarm is on.

## 2022-07-23 NOTE — PROGRESS NOTES
"Orthopedic Progress Note    Subjective:  No acute events.    Objective:  /66 (BP Location: Right arm)   Pulse 65   Temp 98.2  F (36.8  C) (Oral)   Resp 19   Ht 1.727 m (5' 8\")   Wt 62.1 kg (137 lb)   SpO2 93%   BMI 20.83 kg/m    Being shaved by nursing staff  Dressing clean dry and intact on the left hip    Assessment: 7 Days Post-Op  S/P Procedure(s):  HEMIARTHROPLASTY, HIP, BIPOLAR    Plan:   - Continue PT/OT  - Weightbearing status: WBAT , posterior hip precautions   - Pain Management   - Anticoagulation:  PO BID in addition to SCDs, angeles stockings and early ambulation.  - No dressing change needed unless saturated  - Discharge planning: patient is stable to discharge from orthopedics perspective pending TCU placement, PT/OT progression, pain control, and medical clearance   - Outpatient follow-up with Dr. Matos in 2 weeks for wound check    Report completed by:  Kristopher Bland MD  Date: 7/23/2022  Time: 9:05 AM          "

## 2022-07-23 NOTE — PROGRESS NOTES
Care Management Follow Up    Length of Stay (days): 7    Expected Discharge Date: 07/25/2022     Concerns to be Addressed: discharge planning       Patient plan of care discussed at interdisciplinary rounds: yes    Anticipated Discharge Disposition: Hospice, Long Term Care, Other (Comments) (hospice home)     Anticipated Discharge Services: Other (see comment) (hospice care services, nursing care services)    Anticipated Discharge DME: Other (see comment) (TBD)    Patient/family educated on Medicare website which has current facility and service quality ratings: yes    Education Provided on the Discharge Plan:  Yes    Patient/Family in Agreement with the Plan: yes    Referrals Placed by CM/SW: Post Acute Facilities, Hospice    Private pay costs discussed: N/A    Additional Information: SW informed by Dr Worthington that pt failed swallow study.  Pt aspirating everything.  Pt on IV fluids.  Family does not want feeding tube for pt.  Pt and family are meeting with palliative care this morning and then may want hospice.      Per charge nurse, they are trialing pt off of 1:1.      Per Dr Lopez from palliative care, pt now comfort cares and family wants hospice.    KERA met with pt's wife, son, and daughter to discuss discharge planning.  Family requests that SW send referrals to [facility],  [facility] at [facility], and send a new referral to [facility] for L[facility] (with hospice).  Discussed that KERA would speak with [facility] to see about if [facility] has any beds available.  KERA spoke with nurse Ferrell from hospice - no admits on the weekends to [facility] but may have bed available on Monday.  For today, family asks that SW provide updates to pt's daughter Ruchi.      ALEXANDRIA Nova, ROBB 07/22/22 8:51 PM

## 2022-07-24 NOTE — PROGRESS NOTES
Assessment:  S/P Left Hip Hemiarthroplasty, POD 8, doing well    Plan:    Continue PT/OT  - Weightbearing status: WBAT , posterior hip precautions   - Pain Management   - Anticoagulation:  PO BID in addition to SCDs, angeles stockings and early ambulation.  - No dressing change needed unless saturated  - Discharge planning: patient is stable to discharge from orthopedics perspective pending TCU placement, PT/OT progression, pain control, and medical clearance   - Outpatient follow-up with Dr. Matos in 2 weeks for wound check    Subjective:    No acute events    Objective:    128/77  as of 7/24/2022 128/77      Temp: 98.3  F (36.8  C)  as of 7/24/2022      Pulse: 86  as of 7/24/2022      Resp: 24  as of 7/24/2022      SpO2: 87% Abnormal   as of 7/24/2022      Weight: 62.1 kg (137 lb)  as of 7/15/2022        General: patient sleeping peacefully  Dressing is dry. No surrounding erythema or induration.     Freddy Pizarro PA-C  [facility]

## 2022-07-24 NOTE — PLAN OF CARE
Goal Outcome Evaluation:  Patient became restless after granddaughters visit and they left for the evening.  Gave HS medications along with prn ativan as he was looking exhausted.  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   Patient slept the whole night, monitored closely.

## 2022-07-24 NOTE — PLAN OF CARE
Problem: Risk for Delirium  Goal: Improved Sleep  Outcome: Ongoing, Progressing  Patient asleep from 3750-7775. Arousable during repositioning. During this time, scheduled medications not given due to lethargy. Upon awakening at 1300, patient once again conversive but pleasant.     Problem: Palliative Care  Goal: Enhanced Quality of Life  Outcome: Ongoing, Progressing  Appears comfortable during this shift. Repositioned in bed, oral cares completed. Family at bedside.    Celia Hinojosa RN

## 2022-07-24 NOTE — PLAN OF CARE
Problem: Behavior Regulation Impairment (Dementia Signs/Symptoms)  Goal: Improved Behavioral Control (Dementia Signs/Symptoms)  Outcome: Ongoing, Progressing  Patient restless from 3418-7913. Patient cleaned, repositioned and redirected. Tylenol given. No improvement in restlessness. Patient made multiple attempts to get out of bed unassisted during this time. PRN Seroquel given. Appears to have been effective. Patient did rest in bed for approximately 4 hours thereafter. Family at bedside. Alarms in place for patient safety.    Problem: Palliative Care  Goal: Enhanced Quality of Life  Outcome: Ongoing, Progressing  Comfort care measures in place. Patient overall appears comfortable.    Celia Hinojosa RN

## 2022-07-24 NOTE — PROGRESS NOTES
Speech Language Therapy Discharge Summary    Reason for therapy discharge:    No further expectations of functional progress.  Patient transitioned to comfort care and diet has been liberated to regular and thin.    Progress towards therapy goal(s). See goals on Care Plan in [facility] electronic health record for goal details.  N/A, change in goals of care to comfort approach.    Therapy recommendation(s):    No further therapy is recommended.

## 2022-07-24 NOTE — PROGRESS NOTES
[facility]    Medicine Progress Note - Hospitalist Service       Date of Admission:  7/15/2022  Active Problems:    Hip fracture, left, closed, initial encounter (H)     Assessment & Plan          88-year-old male with past medical history of Parkinson's disease, dementia was brought to ED for evaluation of left hip pain after mechanical fall, found to have left hip fracture, underwent surgery and admitted for further management.  Patient's hospital course has been complicated by encephalopathy and acute on likely chronic dysphagia.  He is now on comfort care     1.Mechanical fall and sustained displaced left femur neck fracture;  - On 7/16, s/p left unipolar hip hemiarthroplasty by Dr. Matos  -Postoperative care, DVT prophylaxis per orthopedic team asa bid  -Added scheduled Tylenol, try to avoid narcotics given his age and delirium  Now on comfort care, discontinue PT OT.  Pain controlled.       2.Acute blood loss, postoperative;  - Operative note reported  mL  - Hemoglobin fairly stable.  Hemoglobin 11.5 on 7/21  No further lab draws     3.Acute metabolic encephalopathy with underlying dementia;  -Likely due to narcotic medication, different surroundings, underlying dementia/Parkinson's  -  delirium protocol  -was on 1:1 til late on 7/19  -checked head ct-neg  -psych consult-apprecaited  -Seroquel-was scheduled and then stopped with more slurred speech  Had some agitation after his family left, was given both Seroquel and lorazepam at the same time, slept through the morning, woke up at about noon  I have asked the staff not to give him lorazepam and Seroquel at the same time but to try Seroquel first, then give lorazepam later if not effective     4.acute on likely chronic dysphagia   aspiration risks--very high  -did poorly on follow evaluation  -after long discussion on 7/21 family decided NOT to do feeding tube.  Suspect chronic.  Now on comfort care, diet as  tolerated.     6.History of colon s/p post surgery and colostomy bag;  - Patient's daughter reported on remission and taking daily MiraLAX, ordered.  - Continue colostomy bag care--has had small output.     7.History of parkinsonism; with dementia--retired ([employer])  -Continue Sinemet  -MRI done, no cva     8.Leukocytosis on admission; likely stress.  Resolved  - On admission, no reported recent febrile illness, respiratory/GI/ symptoms.     9.Hypokalemia  -replaced     10.Moderate malnutrition  -now failed swallow--no FT  Now on comfort care     11.DVT prophylaxis; per orthopedic team, they started patient on twice daily aspirin.  Discontinued, now on comfort care     Goals of care: DNR/DNI, family now wishes for comfort care and discharged with hospice     Diet: Diet  Regular Diet Adult    DVT Prophylaxis: None  Kevin Catheter: Not present  Central Lines: None  Code Status: No CPR- Do NOT Intubate      Disposition Plan      Expected Discharge Date: 07/25/2022    Discharge Delays: Comfort Care/Hospice  Placement - LTC  Destination: other (comment) (LTC w/hospice vs hospice home)  Discharge Comments: video swallow 7/21-if fails would need FT and then would be here days  getting mri head  Placement: LTC w/ hospice vs hospice residence        The patient's care was discussed with the Bedside Nurse, Care Coordinator/, Patient and Patient's Family. for total time 35 minutes with greater than 50% of total time spent in counseling and coordination of care.    ABEBE GÓMEZ MD  Hospitalist Service  [facility]  Securely message with the Vocera Web Console (learn more here)  Text page via [employer] Paging/Directory        Clinically Significant Risk Factors Present on Admission                      ______________________________________________________________________    Interval History   Remainder of 12 point review of systems negative except as noted  "below    Subjective:  Patient mildly agitated last night, resolved with both Seroquel and lorazepam given at the same time.  Sleeping this morning.  Awake later this afternoon.    Data reviewed today: I reviewed all medications, new labs and imaging results over the last 24 hours.     Physical Exam   Vital Signs: Temp: 98.3  F (36.8  C) Temp src: Axillary BP: 128/77 Pulse: 86   Resp: 24 SpO2: (!) 87 % O2 Device: None (Room air)    Weight: 137 lbs 0 oz  Physical Exam:  Temp:  [98.3  F (36.8  C)] 98.3  F (36.8  C)  Pulse:  [86] 86  Resp:  [24] 24  BP: (128)/(77) 128/77  SpO2:  [87 %] 87 %    /77 (BP Location: Right arm)   Pulse 86   Temp 98.3  F (36.8  C) (Axillary)   Resp 24   Ht 1.727 m (5' 8\")   Wt 62.1 kg (137 lb)   SpO2 (!) 87%   BMI 20.83 kg/m    General appearance: alert, appears stated age, cachectic, cooperative, no distress and slowed mentation  Head: Normocephalic, without obvious abnormality, atraumatic  Eyes: Clear conjuctiva  Neck: no JVD and supple, symmetrical, trachea midline  Lungs: diminished breath sounds bilaterally  Heart: regular rate and rhythm, S1, S2 normal, no murmur, click, rub or gallop  Abdomen: soft, non-tender; bowel sounds normal; no masses,  no organomegaly  Extremities: Noam's sign is negative, no sign of DVT  Skin: Bandaged left hip incision  Neurologic: Mental status: alertness: alert  Cranial nerves: III,IV,VI: extraocular muscles extra-ocular motions intact, VII: upper facial muscle function normal bilaterally, VII: lower facial muscle function normal bilaterally  Sensory: normal  Motor:grossly normal  Colostomy intact with brown stool        Data   Recent Labs   Lab 07/22/22  0709 07/21/22  1715 07/21/22  1543 07/21/22  0631 07/20/22  0716   WBC  --   --   --  7.7  --    HGB 11.1*  --   --  11.5* 10.5*   MCV  --   --   --  93  --    PLT  --   --   --  171  --      --   --  143 142   POTASSIUM 3.6 4.0 4.1 3.3* 3.5   CHLORIDE 109*  --   --  107 108*   CO2 27  " --   --  30 28   BUN 15  --   --  17 17   CR 0.71  --   --  0.76 0.77   ANIONGAP 5  --   --  6 6   SELINA 8.3*  --   --  8.5 8.4*     --   --  107 97     No results found for this or any previous visit (from the past 24 hour(s)).

## 2022-07-25 NOTE — CONSULTS
Hospice consult received. Spoke to admission coordinator, Kayla, from [facility]. They do have a bed available, but due to staffing, cannot admit pt's after noon. Asked SW to try to arrange transportation earlier in the morning on Wednesday so that [facility] will be able to admit. Will continue to follow up in the morning to make arrangements.    Jill Schoenecker, RN  [facility]  [phone]

## 2022-07-25 NOTE — PROGRESS NOTES
[facility]  Palliative Care Daily Progress Note       Recommendations & Counseling     Jesus continues on comfort focused goals of care, with no unmet symptom needs at this time.  Awaiting placement at SNF vs hospice home.      If accepted to SNF/ hospice facility, would need Rx in hand (3-day supply) at discharge---unless he goes to [facility] (in which case the [facility] hospice pharmacy would provide medications per their formulary).  Recommend the following:  - seroquel 12.5mg PO at bedtime  - seroquel 12.5mg PO q6H prn agitation/anxiety   - NO haloperidol due to parkinson's disease.  - lorazepam 0.5mg SL q4H prn anxiety/nausea/insomnia.  - roxanol 3-5mg SL q4H prn dyspnea or pain.  - lidocaine ointment prn to back/areas of pain.    Goals of care: comfort focused goals of care and on comfort care.    Advanced care planning: HCD on file, has POLST (DNR/comfort) completed.  Code status: DNR/DNI.  Surrogate decision makers: agent wife Betty, first alternate son Mychal, second alternate daughter Ruchi.    Support: Lauryn sin (belonged to [employer] in [facility] for many years), loves singing in choir.  - two children, two grandchildren, and wife Betty.    Symptoms:  Delirium and dementia with behavioral disturbance:  - off 1:1 staffing over weekend, doing well.  - neuroleptic medications had been on hold, resumed seroquel at hs.  - NO haloperidol due to parkinson's disease.  - if significant issues with increased agitation/terminal delirium would use seroquel as neuroleptic of choice.  - may need to allow use of lorazepam     Pain, due to recent L femur fracture and hemiarthroplasty  Stiffness due to PD can also contribute to pain.  - continue scheduled tylenol TID.  - prn opioid Rx ordered (morphine, GFR adequate)--hasn't needed any doses.  - ice/heat prn.  - continue carbidopa/levodopa until unable to swallow.     Secretions and dysphagia to all textures with silent aspiration: stable.  Risk  for developing dyspnea and air hunger  - guaifenesin for now while able to take PO (ordered PRN)  - diet for comfort, discussed at length with family who are in alignment.  - no desire to place FT as high likelihood he would pull it out and they would not want to restrain Kwan to preent FT scontinuation.  - opioids should be scheduled (would start with 3-5mg SL roxanol q6 hours with additional prn as ordered) IF he develops increased work of breathing.  - discussed with Jesus's family need to monitor for nonverbal signs/symptoms of pain and treat findings of brow furrowing, forehead wrinkling, grimacing, tachypnea or accessory muscle use with opioid medications, repositioning etc.      Assessments          Kawn Yi is a 88 year old male with Parkinson's disease (onset 2013, dx 2018) with dementia,rectal cancer (surgery 2009, had met to liver tx w stereotactic radiotherapy in 2021 w/o evidence recurrence), presenting after a mechanical fall with L hip fracture (L hip hemiarthroplasty 7/16/22) and hospital course complicated by delirium and found to have silent aspiration with all textures, severe OP dysphagia w high aspiration with all consistencies.     Today, the patient was seen for:  Follow up on symptom management for end of life.    Prognosis, Goals, or Advance Care Planning was addressed today with: Yes.  Mood, coping, and/or meaning in the context of serious illness were addressed today: Yes.              Interval History:     Chart review/discussion with unit or clinical team members:   Has been off 1:1 for past 48 hrs and doing well.  Episodes of sleepiness alternating with more alertness.  No aggressive behaviors or issues.  Has colostomy, continuing ostomy output daily.    Per patient or family/caregivers today:  Jesus feels well today, eating breakfast and denies significant pain.    Updated daughter Ruchi via telephone; attempted to call wife Betty also but no answer to my call.  Discussed  plan of care with Dr Martinez, hospitalist.    [facility] Symptoms:  Jesus denies significant pain; endorses L hip pain when he's walking.  Denies dyspnea or nausea.              Review of Systems:     Due to advanced dementia, ROS is limited.         Medications:     I have reviewed this patient's medication profile and medications during this hospitalization.    Noted meds:    Current Facility-Administered Medications   Medication     acetaminophen (TYLENOL) tablet 650 mg     acetaminophen (TYLENOL) tablet 650 mg     artificial saliva (BIOTENE MT) solution 2 spray     benzocaine-menthol (CEPACOL) 15-3.6 MG lozenge 1 lozenge     bisacodyl (DULCOLAX) suppository 10 mg     bisacodyl (DULCOLAX) suppository 10 mg     carbidopa-levodopa (SINEMET)  MG per tablet 1 tablet    And     carbidopa-levodopa half-tab 12.5-50 mg     carboxymethylcellulose PF (REFRESH PLUS) 0.5 % ophthalmic solution 1-2 drop     famotidine (PEPCID) tablet 20 mg     guaiFENesin (ROBITUSSIN) 20 mg/mL solution 10 mL     HYDROmorphone (DILAUDID) injection 0.2 mg     latanoprost (XALATAN) 0.005 % ophthalmic solution 1 drop     lidocaine (LMX4) cream     lidocaine (XYLOCAINE) 5 % ointment     lidocaine 1 % 0.1-1 mL     LORazepam (ATIVAN) 2 MG/ML (HIGH CONC) oral solution 0.5 mg     magnesium hydroxide (MILK OF MAGNESIA) suspension 30 mL     melatonin tablet 3 mg     mineral oil-hydrophilic petrolatum (AQUAPHOR)     morphine (PF) injection 1-2 mg     morphine solution 5-10 mg    Or     morphine sulfate (ROXANOL) 20 mg/mL (HIGH CONC) soln 5-10 mg     naloxone (NARCAN) injection 0.2 mg    Or     naloxone (NARCAN) injection 0.4 mg    Or     naloxone (NARCAN) injection 0.2 mg    Or     naloxone (NARCAN) injection 0.4 mg     ondansetron (ZOFRAN ODT) ODT tab 4 mg    Or     ondansetron (ZOFRAN) injection 4 mg     polyethylene glycol (MIRALAX) Packet 17 g     QUEtiapine (SEROquel) half-tab 12.5 mg     QUEtiapine (SEROquel) half-tab 12.5 mg      "senna-docusate (SENOKOT-S/PERICOLACE) 8.6-50 MG per tablet 1 tablet     senna-docusate (SENOKOT-S/PERICOLACE) 8.6-50 MG per tablet 1 tablet   24-hr MME: 0mg (has prn opioid ordered, none given)  Received one dose of 3 scheduled tylenol yesterday   No senna yesterday             Physical Exam:   Vital Signs: Blood pressure 111/64, pulse 61, temperature 98  F (36.7  C), temperature source Oral, resp. rate 18, height 1.727 m (5' 8\"), weight 62.1 kg (137 lb), SpO2 92 %.   GENERAL: elderly male, sitting up in bed eating breakfast and sipping coffee.  Coughs after drinking coffee.  SKIN: Warm and dry   HEENT: Normocephalic, anicteric sclera, moist mucous membranes and temporal muscle wasting.  LUNGS: Clear to auscultation anterolaterally; non-labored, scattered rhonchi.    CARDIAC: RRR, normal s1/s2, w/o m/r/g   ABDOMINAL: BS (+), soft, non distended, colostomy bag with small amount output.  MUSKL: dressing over L lateral hip.  EXTREMITIES: No edema or cyanosis, pulses 2+ and symmetrical  NEUROLOGIC: oriented to self only, not situation or date/place.  PSYCH: affect full, fluent speech, pleasant and cooperative.             Data Reviewed:     Reviewed recent pertinent imaging:   No new imaging since last visit.    Reviewed recent labs:   Creatinine   Date Value Ref Range Status   07/22/2022 0.71 0.70 - 1.30 mg/dL Final           Jyothi Lopez MD  [facility],  Palliative Medicine Service  [phone] department number  Can page via [employer]      "

## 2022-07-25 NOTE — PROGRESS NOTES
"Orthopedic Progress Note      Assessment: 9 Days Post-Op  S/P Procedure(s):  HEMIARTHROPLASTY, HIP, BIPOLAR     Plan:   Continue PT/OT  - Weightbearing status: WBAT , posterior hip precautions   - Pain Management   - Anticoagulation:  PO BID in addition to SCDs, angeles stockings and early ambulation.  - No dressing change needed unless saturated  - Discharge planning: patient is stable to discharge from orthopedics perspective pending TCU placement, PT/OT progression, pain control, and medical clearance   - Outpatient follow-up with Dr. Matos in 2 weeks for wound check      Subjective:  Pain: none-minimal  Nausea, Vomiting:  No  Lightheadedness, Dizziness:  No  Neuro:  Patient denies new onset numbness or paresthesias    Patient laying in bed and does not appear to be in pain. Tolerated exam without visible pain cues.    Objective:  /64 (BP Location: Right arm)   Pulse 61   Temp 98  F (36.7  C) (Oral)   Resp 18   Ht 1.727 m (5' 8\")   Wt 62.1 kg (137 lb)   SpO2 92%   BMI 20.83 kg/m    The patient is A&Ox3. Appears comfortable.   Sensation is intact.  PROM Dorsiflexion and plantar flexion is intact without pain. Wiggle toes intact  Dorsalis pedis pulse intact.  Calves are soft and non-tender. Negative Noam's.  The incision is covered. Dressing C/D/I.    Pertinent Labs   Lab Results: personally reviewed.   No results found for: INR, PROTIME  Lab Results   Component Value Date    WBC 7.7 07/21/2022    HGB 11.1 (L) 07/22/2022    HCT 34.8 (L) 07/21/2022    MCV 93 07/21/2022     07/21/2022     Lab Results   Component Value Date     07/22/2022    CO2 27 07/22/2022         Report completed by:  Zoraida Dela Cruz PA-C, ERLIN  Date: 7/25/2022  Time: 3:18 PM      "

## 2022-07-25 NOTE — PROGRESS NOTES
[facility]    Medicine Progress Note - Hospitalist Service       Date of Admission:  7/15/2022  Active Problems:    Hip fracture, left, closed, initial encounter (H)     Assessment & Plan          88-year-old male with past medical history of Parkinson's disease, dementia was brought to ED for evaluation of left hip pain after mechanical fall, found to have left hip fracture, underwent surgery and admitted for further management.  Patient's hospital course has been complicated by encephalopathy and acute on likely chronic dysphagia.  He is now on comfort care     1.Mechanical fall and sustained displaced left femur neck fracture;  - On 7/16, s/p left unipolar hip hemiarthroplasty by Dr. Matos  -Postoperative care, DVT prophylaxis per orthopedic team asa bid  -Added scheduled Tylenol, try to avoid narcotics given his age and delirium  Now on comfort care, discontinue PT OT.  Pain controlled with Tylenol, not requiring narcotics.     2.Acute blood loss, postoperative;  - Operative note reported  mL  - Hemoglobin fairly stable.  Hemoglobin 11.5 on 7/21  No further lab draws     3.Acute metabolic encephalopathy with underlying dementia;  -Likely due to narcotic medication, different surroundings, underlying dementia/Parkinson's  -  delirium protocol  -was on 1:1 til late on 7/19  -checked head ct-neg  -psych consult-apprecaited  Symptoms controlled with Seroquel or lorazepam  Scheduling Seroquel at night     4.acute on likely chronic dysphagia   aspiration risks--very high  -did poorly on follow evaluation  -after long discussion on 7/21 family decided NOT to do feeding tube.  Suspect chronic.  Now on comfort care, diet as tolerated.     6.History of colon s/p post surgery and colostomy bag;  - Patient's daughter reported on remission and taking daily MiraLAX, ordered.  - Continue colostomy bag care--has had small output.     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  Resolved  - On admission, no reported recent febrile illness, respiratory/GI/ symptoms.     9.Hypokalemia  -replaced     10.Moderate malnutrition  -now failed swallow--no FT  Now on comfort care     11.DVT prophylaxis; per orthopedic team, they started patient on twice daily aspirin.  Discontinued, now on comfort care     Goals of care: DNR/DNI, family now wishes for comfort care and discharged with hospice     Diet: Diet  Regular Diet Adult    DVT Prophylaxis: None  Kevin Catheter: Not present  Central Lines: None  Code Status: No CPR- Do NOT Intubate      Disposition Plan Discharge to hospice when bed available, likely tomorrow to [facility] if hospice agency can be found          The patient's care was discussed with the Bedside Nurse, Care Coordinator/, Patient and Patient's Family. for total time 25 minutes with greater than 50% of total time spent in counseling and coordination of care.    ABEBE GÓMEZ MD  Hospitalist Service  [facility]  Securely message with the Vocera Web Console (learn more here)  Text page via [employer] Paging/Directory        Clinically Significant Risk Factors Present on Admission                      ______________________________________________________________________    Interval History   Remainder of 12 point review of systems negative except as noted below    Subjective:  Patient slept well last night with scheduled Seroquel, did not require lorazepam.  Eating this morning.   Data reviewed today: I reviewed all medications, new labs and imaging results over the last 24 hours.     Physical Exam   Vital Signs: Temp: 98  F (36.7  C) Temp src: Oral BP: 111/64 Pulse: 61   Resp: 18 SpO2: 92 % O2 Device: None (Room air)    Weight: 137 lbs 0 oz  Physical Exam:  Temp:  [98  F (36.7  C)] 98  F (36.7  C)  Pulse:  [61] 61  Resp:  [18] 18  BP:  "(111)/(64) 111/64  SpO2:  [92 %] 92 %    /64 (BP Location: Right arm)   Pulse 61   Temp 98  F (36.7  C) (Oral)   Resp 18   Ht 1.727 m (5' 8\")   Wt 62.1 kg (137 lb)   SpO2 92%   BMI 20.83 kg/m    General appearance: alert, appears stated age, cachectic, cooperative, no distress and slowed mentation  Head: Normocephalic, without obvious abnormality, atraumatic  Eyes: Clear conjuctiva  Neck: no JVD and supple, symmetrical, trachea midline  Lungs: diminished breath sounds bilaterally  Heart: regular rate and rhythm, S1, S2 normal, no murmur, click, rub or gallop  Abdomen: soft, non-tender; bowel sounds normal; no masses,  no organomegaly  Extremities: Noam's sign is negative, no sign of DVT  Skin: Bandaged left hip incision  Neurologic: Mental status: alertness: alert  Cranial nerves: III,IV,VI: extraocular muscles extra-ocular motions intact, VII: upper facial muscle function normal bilaterally, VII: lower facial muscle function normal bilaterally  Sensory: normal  Motor:grossly normal  Colostomy intact with brown stool        Data   Recent Labs   Lab 07/22/22  0709 07/21/22  1715 07/21/22  1543 07/21/22  0631 07/20/22  0716   WBC  --   --   --  7.7  --    HGB 11.1*  --   --  11.5* 10.5*   MCV  --   --   --  93  --    PLT  --   --   --  171  --      --   --  143 142   POTASSIUM 3.6 4.0 4.1 3.3* 3.5   CHLORIDE 109*  --   --  107 108*   CO2 27  --   --  30 28   BUN 15  --   --  17 17   CR 0.71  --   --  0.76 0.77   ANIONGAP 5  --   --  6 6   SELINA 8.3*  --   --  8.5 8.4*     --   --  107 97     No results found for this or any previous visit (from the past 24 hour(s)).  "

## 2022-07-25 NOTE — PLAN OF CARE
Goal Outcome Evaluation:  Patient was able to sleep over night.  Did try to get out of bed once and he was incontinent pad was wet.  Cleaned him up and changed pad and he immediately went back to sleep.  Continues to cough when given medication in apple sauce and water.  Dressing changed on hip as it was coming off, area clean dry and intact.  Remains on comfort cares and looks like he will be discharged to Hospice facility today.

## 2022-07-25 NOTE — PLAN OF CARE
"  Problem: Risk for Delirium  Goal: Optimal Coping  7/25/2022 1146 by Shashi Fletcher RN  Outcome: Ongoing, Progressing  7/25/2022 1145 by Shashi Fletcher RN  Outcome: Ongoing, Not Progressing  Goal: Improved Behavioral Control  7/25/2022 1146 by Shashi Fletcher RN  Outcome: Ongoing, Progressing  7/25/2022 1145 by Shashi Fletcher RN  Outcome: Ongoing, Not Progressing  Goal: Improved Attention and Thought Clarity  7/25/2022 1146 by Shashi Fletcher RN  Outcome: Ongoing, Progressing  7/25/2022 1145 by Shashi Fletcher RN  Outcome: Ongoing, Not Progressing  Goal: Improved Sleep  7/25/2022 1146 by Shashi Fletcher RN  Outcome: Ongoing, Progressing  7/25/2022 1145 by Shashi Fletcher RN  Outcome: Ongoing, Not Progressing     Problem: Plan of Care - These are the overarching goals to be used throughout the patient stay.    Goal: Plan of Care Review/Shift Note  Description: The Plan of Care Review/Shift note should be completed every shift.  The Outcome Evaluation is a brief statement about your assessment that the patient is improving, declining, or no change.  This information will be displayed automatically on your shift note.  7/25/2022 1146 by Shashi Fletcher RN  Outcome: Ongoing, Progressing  Flowsheets (Taken 7/25/2022 1146)  Plan of Care Reviewed With: patient  7/25/2022 1145 by Shashi Fletcher RN  Outcome: Ongoing, Not Progressing  Goal: Patient-Specific Goal (Individualized)  Description: You can add care plan individualizations to a care plan. Examples of Individualization might be:  \"Parent requests to be called daily at 9am for status\", \"I have a hard time hearing out of my right ear\", or \"Do not touch me to wake me up as it startles me\".  7/25/2022 1146 by Shashi Fletcher RN  Outcome: Ongoing, Progressing  7/25/2022 1145 by Shashi Fletcher RN  Outcome: Ongoing, Not Progressing  Goal: Absence of Hospital-Acquired Illness or Injury  7/25/2022 1146 by Chance, Shashi A, RN  Outcome: Ongoing, Progressing  7/25/2022 1145 by Chance, " Shashi DANIELS RN  Outcome: Ongoing, Not Progressing  Intervention: Identify and Manage Fall Risk  Recent Flowsheet Documentation  Taken 7/25/2022 0808 by Shashi Fletcher RN  Safety Promotion/Fall Prevention:   bed alarm on   clutter free environment maintained  Intervention: Prevent Skin Injury  Recent Flowsheet Documentation  Taken 7/25/2022 0808 by Shashi Fletcher RN  Body Position:   log-rolled   right   turned  Intervention: Prevent and Manage VTE (Venous Thromboembolism) Risk  Recent Flowsheet Documentation  Taken 7/25/2022 0808 by Shashi Fletcher RN  Activity Management: bedrest  Goal: Optimal Comfort and Wellbeing  7/25/2022 1146 by Shashi Fletcher RN  Outcome: Ongoing, Progressing  7/25/2022 1145 by Shashi Fletcher RN  Outcome: Ongoing, Not Progressing  Goal: Readiness for Transition of Care  7/25/2022 1146 by Shashi Fletcher RN  Outcome: Ongoing, Progressing  7/25/2022 1145 by Shashi Fletcher RN  Outcome: Ongoing, Not Progressing     Problem: Pain Acute  Goal: Acceptable Pain Control and Functional Ability  7/25/2022 1146 by Shashi Fletcher RN  Outcome: Ongoing, Progressing  7/25/2022 1145 by Shashi Fletcher RN  Outcome: Ongoing, Not Progressing     Problem: Behavior Regulation Impairment (Dementia Signs/Symptoms)  Goal: Improved Behavioral Control (Dementia Signs/Symptoms)  7/25/2022 1146 by Shashi Fletcher RN  Outcome: Ongoing, Progressing  7/25/2022 1145 by Shashi Fletcher RN  Outcome: Ongoing, Not Progressing     Problem: Cognitive Impairment (Dementia Signs/Symptoms)  Goal: Optimized Cognitive Function (Dementia Signs/Symptoms)  7/25/2022 1146 by Shashi Fletcher RN  Outcome: Ongoing, Progressing  7/25/2022 1145 by Shashi Fletcher RN  Outcome: Ongoing, Not Progressing     Problem: Mood Impairment (Dementia Signs/Symptoms)  Goal: Improved Mood Symptoms (Dementia Signs/Symptoms)  7/25/2022 1146 by Shashi Fletcher RN  Outcome: Ongoing, Progressing  7/25/2022 1145 by Shashi Fletcher RN  Outcome: Ongoing, Not Progressing      Problem: Nutrition Imbalance (Dementia Signs/Symptoms)  Goal: Optimized Nutrition Intake (Dementia Signs/Symptoms)  7/25/2022 1146 by Shashi Fletcher RN  Outcome: Ongoing, Progressing  7/25/2022 1145 by Shashi Fletcher RN  Outcome: Ongoing, Not Progressing     Problem: Sleep Disturbance (Dementia Signs/Symptoms)  Goal: Improved Sleep (Dementia Signs/Symptoms)  7/25/2022 1146 by Shashi Fletcher RN  Outcome: Ongoing, Progressing  7/25/2022 1145 by Shashi Fletcher RN  Outcome: Ongoing, Not Progressing     Problem: Social or Functional Impairment (Dementia Signs/Symptoms)  Goal: Enhanced Social or Functional Skills and Ability (Dementia Signs/Symptoms)  7/25/2022 1146 by Shashi Fletcher RN  Outcome: Ongoing, Progressing  7/25/2022 1145 by Shashi Fletcher RN  Outcome: Ongoing, Not Progressing     Problem: Suicide Risk  Goal: Absence of Self-Harm  7/25/2022 1146 by Shashi Fletcher RN  Outcome: Ongoing, Progressing  7/25/2022 1145 by Shashi Fletcher RN  Outcome: Ongoing, Not Progressing     Problem: Adjustment to Surgery (Hip Arthroplasty)  Goal: Optimal Coping  7/25/2022 1146 by Shashi Fletcher RN  Outcome: Ongoing, Progressing  7/25/2022 1145 by Shashi Fletcher RN  Outcome: Ongoing, Not Progressing     Problem: Bleeding (Hip Arthroplasty)  Goal: Absence of Bleeding  7/25/2022 1146 by Shashi Fletcher RN  Outcome: Ongoing, Progressing  7/25/2022 1145 by Shashi Fletcher RN  Outcome: Ongoing, Not Progressing     Problem: Bowel Motility Impaired (Hip Arthroplasty)  Goal: Effective Bowel Elimination  7/25/2022 1146 by Shashi Fletcher RN  Outcome: Ongoing, Progressing  7/25/2022 1145 by Shashi Fletcher RN  Outcome: Ongoing, Not Progressing     Problem: Fluid and Electrolyte Imbalance (Hip Arthroplasty)  Goal: Fluid and Electrolyte Balance  7/25/2022 1146 by Shashi Fletcher RN  Outcome: Ongoing, Progressing  7/25/2022 1145 by Shashi Fletcher RN  Outcome: Ongoing, Not Progressing     Problem: Functional Ability Impaired (Hip  Arthroplasty)  Goal: Optimal Functional Ability  7/25/2022 1146 by Shashi Fletcher RN  Outcome: Ongoing, Progressing  7/25/2022 1145 by Shashi Fletcher RN  Outcome: Ongoing, Not Progressing  Intervention: Promote Optimal Functional Status  Recent Flowsheet Documentation  Taken 7/25/2022 0808 by Shashi Fletcher RN  Activity Management: bedrest     Problem: Infection (Hip Arthroplasty)  Goal: Absence of Infection Signs and Symptoms  7/25/2022 1146 by Shashi Fletcher RN  Outcome: Ongoing, Progressing  7/25/2022 1145 by Shashi Fletcher RN  Outcome: Ongoing, Not Progressing     Problem: Neurovascular Compromise (Hip Arthroplasty)  Goal: Intact Neurovascular Status  7/25/2022 1146 by Shashi Fletcher RN  Outcome: Ongoing, Progressing  7/25/2022 1145 by Shashi Fletcher RN  Outcome: Ongoing, Not Progressing     Problem: Ongoing Anesthesia Effects (Hip Arthroplasty)  Goal: Anesthesia/Sedation Recovery  7/25/2022 1146 by Shashi Fletcher RN  Outcome: Ongoing, Progressing  7/25/2022 1145 by Shashi Fletcher RN  Outcome: Ongoing, Not Progressing  Intervention: Optimize Anesthesia Recovery  Recent Flowsheet Documentation  Taken 7/25/2022 0808 by Shashi Fletcher RN  Safety Promotion/Fall Prevention:   bed alarm on   clutter free environment maintained     Problem: Pain (Hip Arthroplasty)  Goal: Acceptable Pain Control  7/25/2022 1146 by Shashi Fletcher RN  Outcome: Ongoing, Progressing  7/25/2022 1145 by Shashi Fletcher RN  Outcome: Ongoing, Not Progressing     Problem: Postoperative Urinary Retention (Hip Arthroplasty)  Goal: Effective Urinary Elimination  7/25/2022 1146 by Shashi Fletcher RN  Outcome: Ongoing, Progressing  7/25/2022 1145 by Shashi Fletcher RN  Outcome: Ongoing, Not Progressing     Problem: Respiratory Compromise (Hip Arthroplasty)  Goal: Effective Oxygenation and Ventilation  7/25/2022 1146 by Shashi Fletcher RN  Outcome: Ongoing, Progressing  7/25/2022 1145 by Shashi Fletcher RN  Outcome: Ongoing, Not Progressing  Intervention:  Optimize Oxygenation and Ventilation  Recent Flowsheet Documentation  Taken 7/25/2022 0808 by Shashi Fletcher, RN  Head of Bed (HOB) Positioning: HOB at 20-30 degrees     Problem: Palliative Care  Goal: Enhanced Quality of Life  7/25/2022 1146 by Shashi Fletcher, RN  Outcome: Ongoing, Progressing  7/25/2022 1145 by Shashi Fletcher, RN  Outcome: Ongoing, Not Progressing   Goal Outcome Evaluation:    Plan of Care Reviewed With: patient

## 2022-07-25 NOTE — PROGRESS NOTES
Care Management Follow Up    Length of Stay (days): 10    Expected Discharge Date: 07/26/2022     Concerns to be Addressed: discharge planning     Patient plan of care discussed at interdisciplinary rounds: Yes    Anticipated Discharge Disposition: Hospice, Other (Comments), Long Term Care     Anticipated Discharge Services: Transport  [facility]  at 12pm via stretcher.  (hospice care services, nursing care services)  Anticipated Discharge DME: Other (see comment) (TBD)    Patient/family educated on Medicare website which has current facility and service quality ratings: yes  Education Provided on the Discharge Plan:    Patient/Family in Agreement with the Plan: yes    Referrals Placed by CM/SW: Post Acute Facilities, Hospice, [facility] and [facility].  Private pay costs discussed: private room/amenity fees and transportation costs    Additional Information:  -KERA spoke with Cathie [facility]. Cathie looked into bed availability at [facility].  There is a bed available for 7/26, however pt will need a hospice agency as [facility] cannot start services until Wednesday.  -KERA met with the pt and his daughter Ruchi. Ruchi voiced understanding and agreed to have Hospice referrals sent out to [facility].  -Transport arranged with [facility] set up with [employer] due to impulsive and needs to be monitored.  12pm. Will call if an earlier time available. Meds are going with pot per MD.   -Kayla at [facility] is in at 8:30 am 7/26.  -Spoke with Cathie from [facility]. They had a cancellation and can  pt starting tomorrow.     Miriam Ingram, MSW, LICSW

## 2022-07-26 PROBLEM — C18.9 METASTATIC COLON CANCER TO LIVER (H): Status: ACTIVE | Noted: 2022-01-01

## 2022-07-26 PROBLEM — C78.7 METASTATIC COLON CANCER TO LIVER (H): Status: ACTIVE | Noted: 2022-01-01

## 2022-07-26 NOTE — PROGRESS NOTES
[facility]  Palliative Care Daily Progress Note       Recommendations & Counseling     Jesus continues on comfort focused goals of care, with no unmet symptom needs at this time.  Awaiting placement at SNF vs hospice home.       Jesus has been accepted to the [facility] facility, hospitalist has coordinated (and appreciated) Rx in hand (3-day supply) at discharge---  Recommend the following:  - seroquel 12.5mg PO at bedtime  - seroquel 12.5mg PO q6H prn agitation/anxiety   - NO haloperidol due to parkinson's disease.  - lorazepam 0.5mg SL q4H prn anxiety/nausea/insomnia.  - roxanol 3-5mg SL q4H prn dyspnea or pain.  - lidocaine ointment prn to back/areas of pain.     Goals of care: comfort focused goals of care and on comfort care.     Advanced care planning: HCD on file, has POLST (DNR/comfort) completed.  Code status: DNR/DNI.  Surrogate decision makers: agent wife Betty, first alternate son Mychal, second alternate daughter Ruchi.     Support: Lauryn isn (belonged to [employer] in [facility] for many years), loves singing in [employer].  - two children, two grandchildren, and wife Betty.     Symptoms:  Delirium and dementia with behavioral disturbance:  - off 1:1 staffing over weekend, doing well, is impulsive at times.  - neuroleptic medications had been on hold, resumed seroquel at hs on 7/24.  - NO haloperidol due to parkinson's disease.  - if significant issues with increased agitation/terminal delirium would use seroquel as neuroleptic of choice.  - careful PRN use of lorazepam     Pain, due to recent L femur fracture and hemiarthroplasty  Stiffness due to PD can also contribute to pain.  - continue scheduled tylenol TID.  - prn opioid Rx ordered (morphine, GFR adequate)--hasn't needed any doses.  - ice/heat prn.  - continue carbidopa/levodopa until unable to swallow.     Secretions and dysphagia to all textures with silent aspiration: stable.  Risk for developing dyspnea and  air hunger  - guaifenesin for now while able to take PO (ordered PRN)  - diet for comfort, discussed at length with family who are in alignment.  - no desire to place FT as high likelihood he would pull it out and they would not want to restrain Kwan to preent FT scontinuation.  - opioids should be scheduled (would start with 3-5mg SL roxanol q6 hours with additional prn as ordered) IF Jesus develops increased work of breathing--appears may be on cusp of this today--need close monitoring at hospice unit.  - Continue to monitor for nonverbal signs/symptoms of pain and treat findings of brow furrowing, forehead wrinkling, grimacing, tachypnea or accessory muscle use with opioid medications, repositioning etc.      Assessments            Kwan Yi is a 88 year old male with Parkinson's disease (onset 2013, dx 2018) with dementia,rectal cancer (surgery 2009, had met to liver tx w stereotactic radiotherapy in 2021 w/o evidence recurrence), presenting after a mechanical fall with L hip fracture (L hip hemiarthroplasty 7/16/22) and hospital course complicated by delirium and found to have silent aspiration with all textures, severe OP dysphagia w high aspiration with all consistencies.   Jesus transitioned to comfort care after family had meetings with hospitalist Dr Rose 7/21 in which family declined artificial nutrition and changed code status to DNR; palliative consulted 7/22 and family affirmed desire to transition to comfort focused goals of care and comfort care.     Today, the patient was seen for:  End of life symptom management  support    Prognosis, Goals, or Advance Care Planning was addressed today with: Yes.  Mood, coping, and/or meaning in the context of serious illness were addressed today: Yes.              Interval History:     Chart review/discussion with unit or clinical team members:   Episodes of impulsivity overnight but with scheduled/prn meds, no issues this morning.    Accepted to the  [facility] hospice facility.      Per patient or family/caregivers today:  Unable to tell me if he's having pain.  Notes breathing is about the same.  Offers no other complaints or concerns.             Review of Systems:     Unable to obtain due to advanced dementia; pt unreliable historian          Medications:     I have reviewed this patient's medication profile and medications during this hospitalization.    Noted meds:    Current Facility-Administered Medications   Medication     acetaminophen (TYLENOL) tablet 650 mg     acetaminophen (TYLENOL) tablet 650 mg     artificial saliva (BIOTENE MT) solution 2 spray     benzocaine-menthol (CEPACOL) 15-3.6 MG lozenge 1 lozenge     bisacodyl (DULCOLAX) suppository 10 mg     carbidopa-levodopa (SINEMET)  MG per tablet 1 tablet    And     carbidopa-levodopa half-tab 12.5-50 mg     carboxymethylcellulose PF (REFRESH PLUS) 0.5 % ophthalmic solution 1-2 drop     famotidine (PEPCID) tablet 20 mg     guaiFENesin (ROBITUSSIN) 20 mg/mL solution 10 mL     latanoprost (XALATAN) 0.005 % ophthalmic solution 1 drop     lidocaine (LMX4) cream     lidocaine (XYLOCAINE) 5 % ointment     lidocaine 1 % 0.1-1 mL     LORazepam (ATIVAN) 2 MG/ML (HIGH CONC) oral solution 0.5 mg     magnesium hydroxide (MILK OF MAGNESIA) suspension 30 mL     melatonin tablet 3 mg     mineral oil-hydrophilic petrolatum (AQUAPHOR)     morphine (PF) injection 1-2 mg     morphine sulfate (ROXANOL) 20 mg/mL (HIGH CONC) soln 5-10 mg     ondansetron (ZOFRAN ODT) ODT tab 4 mg    Or     ondansetron (ZOFRAN) injection 4 mg     QUEtiapine (SEROquel) half-tab 12.5 mg     QUEtiapine (SEROquel) half-tab 12.5 mg     senna-docusate (SENOKOT-S/PERICOLACE) 8.6-50 MG per tablet 1 tablet     senna-docusate (SENOKOT-S/PERICOLACE) 8.6-50 MG per tablet 1 tablet     Current Outpatient Medications   Medication Sig     acetaminophen (TYLENOL) 325 MG suppository Place 1 suppository (325 mg) rectally every 4 hours as needed for fever  "    acetaminophen (TYLENOL) 325 MG tablet Take 2 tablets (650 mg) by mouth every 4 hours as needed for mild pain or fever     carbidopa-levodopa (SINEMET)  MG tablet Take 1.5 tablets by mouth 3 times daily     famotidine (PEPCID) 20 MG tablet Take 1 tablet (20 mg) by mouth 2 times daily for 20 days     latanoprost (XALATAN) 0.005 % ophthalmic solution Place 1 drop into both eyes At Bedtime     lidocaine (XYLOCAINE) 5 % external ointment Apply topically every 4 hours as needed for moderate pain (Apply to painful areas on neck and back as needed)     LORazepam (ATIVAN) 2 MG/ML (HIGH CONC) oral solution Take 0.25 mLs (0.5 mg) by mouth every 4 hours as needed for agitation, anxiety or nausea     morphine sulfate (ROXANOL) 20 mg/mL (HIGH CONC) soln Place 0.25 mLs (5 mg) under the tongue every hour as needed for shortness of breath / dyspnea, breakthrough pain or pain     QUEtiapine (SEROQUEL) 25 MG tablet Take 0.5 tablets (12.5 mg) by mouth every 6 hours as needed (Agitation)     QUEtiapine (SEROQUEL) 25 MG tablet Take 0.5 tablets (12.5 mg) by mouth At Bedtime     senna-docusate (SENOKOT-S/PERICOLACE) 8.6-50 MG tablet Take 1 tablet by mouth 2 times daily as needed for constipation                Physical Exam:   Vital Signs: Blood pressure 137/68, pulse 74, temperature 97.6  F (36.4  C), temperature source Axillary, resp. rate 18, height 1.727 m (5' 8\"), weight 62.1 kg (137 lb), SpO2 94 %.     Intake/Output Summary (Last 24 hours) at 7/26/2022 1049  Last data filed at 7/25/2022 1329  Gross per 24 hour   Intake 120 ml   Output --   Net 120 ml       GENERAL: alert male, sitting up in bed.  Oriented to self only.  Breakfast tray in front of him; tiny amount oatmeal consumed; no other foods eaten.  SKIN: Warm and dry   HEENT: Normocephalic, anicteric sclera, moist mucous membranes with loss of periorbital fat and temporal muscle wasting noted.  LUNGS: Clear to auscultation anterolaterally; rhonchi noted, no accessory " muscle use but mild tachypnea.  CARDIAC: RRR, normal s1/s2, w/o m/r/g   ABDOMINAL: BS (+), soft, colostomy noted, non tender  MUSKL: no gross joint deformities, sarcopenia noted.   EXTREMITIES: No edema or cyanosis, pulses 2+ and symmetrical  NEUROLOGIC: bradykinesia; decreased facial expressiveness.  No myoclonus.  PSYCH: calm, not agitated.    24-hr MME: 0mg  24-hr benzodiazepine: 0.5mg lorazepam SL           Data Reviewed:     Reviewed recent pertinent imaging:   No new imaging  Reviewed recent labs:   Creatinine   Date Value Ref Range Status   07/22/2022 0.71 0.70 - 1.30 mg/dL Final           Jyothi Lopez MD  [facility],  Palliative Medicine Service  [phone] department number  Can page via [employer]

## 2022-07-26 NOTE — PLAN OF CARE
"  Problem: Risk for Delirium  Goal: Optimal Coping  Outcome: Met  Goal: Improved Behavioral Control  Outcome: Met  Goal: Improved Attention and Thought Clarity  Outcome: Met  Goal: Improved Sleep  Outcome: Met     Problem: Plan of Care - These are the overarching goals to be used throughout the patient stay.    Goal: Plan of Care Review/Shift Note  Description: The Plan of Care Review/Shift note should be completed every shift.  The Outcome Evaluation is a brief statement about your assessment that the patient is improving, declining, or no change.  This information will be displayed automatically on your shift note.  Outcome: Met  Goal: Patient-Specific Goal (Individualized)  Description: You can add care plan individualizations to a care plan. Examples of Individualization might be:  \"Parent requests to be called daily at 9am for status\", \"I have a hard time hearing out of my right ear\", or \"Do not touch me to wake me up as it startles me\".  Outcome: Met  Goal: Absence of Hospital-Acquired Illness or Injury  Outcome: Met  Goal: Optimal Comfort and Wellbeing  Outcome: Met  Goal: Readiness for Transition of Care  Outcome: Met     Problem: Pain Acute  Goal: Acceptable Pain Control and Functional Ability  Outcome: Met     Problem: Behavior Regulation Impairment (Dementia Signs/Symptoms)  Goal: Improved Behavioral Control (Dementia Signs/Symptoms)  Outcome: Met     Problem: Cognitive Impairment (Dementia Signs/Symptoms)  Goal: Optimized Cognitive Function (Dementia Signs/Symptoms)  Outcome: Met     Problem: Mood Impairment (Dementia Signs/Symptoms)  Goal: Improved Mood Symptoms (Dementia Signs/Symptoms)  Outcome: Met     Problem: Nutrition Imbalance (Dementia Signs/Symptoms)  Goal: Optimized Nutrition Intake (Dementia Signs/Symptoms)  Outcome: Met     Problem: Sleep Disturbance (Dementia Signs/Symptoms)  Goal: Improved Sleep (Dementia Signs/Symptoms)  Outcome: Met     Problem: Social or Functional Impairment (Dementia " Signs/Symptoms)  Goal: Enhanced Social or Functional Skills and Ability (Dementia Signs/Symptoms)  Outcome: Met     Problem: Suicide Risk  Goal: Absence of Self-Harm  Outcome: Met     Problem: Adjustment to Surgery (Hip Arthroplasty)  Goal: Optimal Coping  Outcome: Met     Problem: Bleeding (Hip Arthroplasty)  Goal: Absence of Bleeding  Outcome: Met     Problem: Bowel Motility Impaired (Hip Arthroplasty)  Goal: Effective Bowel Elimination  Outcome: Met     Problem: Fluid and Electrolyte Imbalance (Hip Arthroplasty)  Goal: Fluid and Electrolyte Balance  Outcome: Met     Problem: Functional Ability Impaired (Hip Arthroplasty)  Goal: Optimal Functional Ability  Outcome: Met     Problem: Infection (Hip Arthroplasty)  Goal: Absence of Infection Signs and Symptoms  Outcome: Met     Problem: Neurovascular Compromise (Hip Arthroplasty)  Goal: Intact Neurovascular Status  Outcome: Met     Problem: Ongoing Anesthesia Effects (Hip Arthroplasty)  Goal: Anesthesia/Sedation Recovery  Outcome: Met     Problem: Pain (Hip Arthroplasty)  Goal: Acceptable Pain Control  Outcome: Met     Problem: Postoperative Urinary Retention (Hip Arthroplasty)  Goal: Effective Urinary Elimination  Outcome: Met     Problem: Respiratory Compromise (Hip Arthroplasty)  Goal: Effective Oxygenation and Ventilation  Outcome: Met     Problem: Palliative Care  Goal: Enhanced Quality of Life  Outcome: Met   Goal Outcome Evaluation:    Plan of Care Reviewed With: patient

## 2022-07-26 NOTE — PLAN OF CARE
Problem: End-of-Life Care  Goal: Comfort, Peace and Preserved Dignity  Outcome: Ongoing, Progressing     Problem: Pain Acute  Goal: Acceptable Pain Control and Functional Ability  Outcome: Ongoing, Progressing  Intervention: Prevent or Manage Pain  Recent Flowsheet Documentation  Taken 7/26/2022 1209 by Svetlana Mcclain RN  Medication Review/Management: medications reviewed     Problem: Fall Injury Risk  Goal: Absence of Fall and Fall-Related Injury  Outcome: Ongoing, Progressing  Intervention: Identify and Manage Contributors  Recent Flowsheet Documentation  Taken 7/26/2022 1209 by Svetlana Mcclain RN  Medication Review/Management: medications reviewed  Intervention: Promote Injury-Free Environment  Recent Flowsheet Documentation  Taken 7/26/2022 1209 by Svetlana Mcclain RN  Safety Promotion/Fall Prevention:   bed alarm on   clutter free environment maintained   room door open   room near nurse's station   safety round/check completed     Problem: Skin Injury Risk Increased  Goal: Skin Health and Integrity  Outcome: Ongoing, Progressing     Problem: Behavior Regulation Impairment (Dementia Signs/Symptoms)  Goal: Improved Behavioral Control (Dementia Signs/Symptoms)  Outcome: Ongoing, Progressing   Goal Outcome Evaluation:      Jesus arrived at 11am via [facility] stretcher transport from [facility] with wife Betty and Dtr Ruchi at bedside. Jesus is a pleasantly confused patient with a hx of dementia, parkinson's, metastatic colon cancer, and a recent fall while on a walk around [facility] where he fractured his left hip. He failed his swallow study on 7/21, which prompted his admission onto hospice. Family had originally been planning to bring him home after his L hip repair, but decided to pursue comfort cares instead.     Jesus has some apnea when he is restful, but otherwise when alert is able to speak, answer questions (although not always appropriately), and otherwise is alert but not  "oriented. 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 Dtr Ruchi expressed he used to be very mobile before the fall, so he may try to get up and walk. Explained the use of bed alarms, tabs, and the baby monitor for safety, family agreed.    Jesus's skin is mostly unremarkable except for his L side which has a large smattering of bruises, excoriation, and various mepilexes covering wounds form his fall. His skin is mildly discolored on the LLE due to surgery with a bruised yellow twinge. His extremities are otherwise warm and dry to touch.     He has a dry, infrequent cough. Given oral swab and mouth moisturizer with great improvement to his ability to communicate, will need frequent oral cares.     Denied pain and discomfort. Family stated he is able to communicate when he has pain, but needs to be assessed frequently.    Svetlana Mcclain RN                 "

## 2022-07-26 NOTE — PLAN OF CARE
Patient is alert and oriented to self and family. Disoriented x 3. Family visited earlier part of the shift. Patient napped x 2 hours. Awake at bedtime, impulsive and continuously climbing out of bed. Patient received scheduled Seroquel and melatonin at hs with no effect. Prn Ativan 0.5 mg given, patient has continued to set off bed alarm on attempts to get out of bed. Will monitor effect of ativan.   Problem: Cognitive Impairment (Dementia Signs/Symptoms)  Goal: Optimized Cognitive Function (Dementia Signs/Symptoms)  Outcome: Ongoing, Progressing     Problem: Cognitive Impairment (Dementia Signs/Symptoms)  Goal: Optimized Cognitive Function (Dementia Signs/Symptoms)  Outcome: Ongoing, Progressing   Goal Outcome Evaluation:

## 2022-07-26 NOTE — PROGRESS NOTES
Care Management Discharge Note    Discharge Date: 07/26/2022       Discharge Disposition: Hospice, [facility] [address] [address]  Discharge [facility] care services, [facility]    Discharge DME: Other (see comment) (TBD)    Discharge Transportation:  [facility] via stretcher. Tuesday, July 26th @ 10:30 AM    Private pay costs discussed: private room/amenity fees and transportation costs    PAS Confirmation Code:    Patient/family educated on Medicare website which has current facility and service quality ratings: yes    Education Provided on the Discharge Plan:yes    Persons Notified of Discharge Plans: Pt, pt's daughter RuchiABUNDIO MD, facility, Bedside RN, Charge RN, Cathie with [facility]  Patient/Family in Agreement with the Plan: yes    Handoff Referral Completed: Yes    Additional Information:  -9:09 AM  KERA spoke with Kayla at [facility].  Kayla stated that they will not have an admitting nurse after 12 PM today, so they are unable to accept pt.  -KERA cancelled transport for today and rescheduled it for tomorrow at 9:30 AM.   - Transport was changed to 10:30 AM   -KERA contacted pt's daughter ruchi and informed her that pt's transport  time is set for 10:30 AM. Ruchi asked for staff to help gather the pt's personal belongings as she will not be at the hospital in time to help pack up prior to his discharge.  KERA discussed this with bedside RN.  -Spoke with Cathie from [facility], She was able to get staff over to [facility] to admit pt into their Hospice services.   -KERA completed the PCS form for transportation.     ALEXANDRIA Waggoner, LICSW  07/26/22  10:25 AM

## 2022-07-26 NOTE — PLAN OF CARE
"  Problem: Risk for Delirium  Goal: Optimal Coping  Outcome: Ongoing, Progressing  Goal: Improved Behavioral Control  Outcome: Ongoing, Progressing  Goal: Improved Attention and Thought Clarity  Outcome: Ongoing, Progressing  Goal: Improved Sleep  Outcome: Ongoing, Progressing     Problem: Plan of Care - These are the overarching goals to be used throughout the patient stay.    Goal: Plan of Care Review/Shift Note  Description: The Plan of Care Review/Shift note should be completed every shift.  The Outcome Evaluation is a brief statement about your assessment that the patient is improving, declining, or no change.  This information will be displayed automatically on your shift note.  Outcome: Ongoing, Progressing  Goal: Patient-Specific Goal (Individualized)  Description: You can add care plan individualizations to a care plan. Examples of Individualization might be:  \"Parent requests to be called daily at 9am for status\", \"I have a hard time hearing out of my right ear\", or \"Do not touch me to wake me up as it startles me\".  Outcome: Ongoing, Progressing  Goal: Absence of Hospital-Acquired Illness or Injury  Outcome: Ongoing, Progressing  Intervention: Identify and Manage Fall Risk  Recent Flowsheet Documentation  Taken 7/26/2022 0300 by Lisbet Madrigal RN  Safety Promotion/Fall Prevention: bed alarm on  Intervention: Prevent Skin Injury  Recent Flowsheet Documentation  Taken 7/26/2022 0300 by Lisbet Madrigal RN  Body Position:   turned   right  Intervention: Prevent and Manage VTE (Venous Thromboembolism) Risk  Recent Flowsheet Documentation  Taken 7/26/2022 0300 by Lisbet Madrigal RN  Activity Management: bedrest  Goal: Optimal Comfort and Wellbeing  Outcome: Ongoing, Progressing  Goal: Readiness for Transition of Care  Outcome: Ongoing, Progressing     Problem: Pain Acute  Goal: Acceptable Pain Control and Functional Ability  Outcome: Ongoing, Progressing     Problem: Behavior Regulation Impairment (Dementia " Signs/Symptoms)  Goal: Improved Behavioral Control (Dementia Signs/Symptoms)  Outcome: Ongoing, Progressing     Problem: Cognitive Impairment (Dementia Signs/Symptoms)  Goal: Optimized Cognitive Function (Dementia Signs/Symptoms)  Outcome: Ongoing, Progressing     Problem: Mood Impairment (Dementia Signs/Symptoms)  Goal: Improved Mood Symptoms (Dementia Signs/Symptoms)  Outcome: Ongoing, Progressing     Problem: Nutrition Imbalance (Dementia Signs/Symptoms)  Goal: Optimized Nutrition Intake (Dementia Signs/Symptoms)  Outcome: Ongoing, Progressing     Problem: Sleep Disturbance (Dementia Signs/Symptoms)  Goal: Improved Sleep (Dementia Signs/Symptoms)  Outcome: Ongoing, Progressing     Problem: Social or Functional Impairment (Dementia Signs/Symptoms)  Goal: Enhanced Social or Functional Skills and Ability (Dementia Signs/Symptoms)  Outcome: Ongoing, Progressing     Problem: Suicide Risk  Goal: Absence of Self-Harm  Outcome: Ongoing, Progressing     Problem: Adjustment to Surgery (Hip Arthroplasty)  Goal: Optimal Coping  Outcome: Ongoing, Progressing     Problem: Bleeding (Hip Arthroplasty)  Goal: Absence of Bleeding  Outcome: Ongoing, Progressing     Problem: Bowel Motility Impaired (Hip Arthroplasty)  Goal: Effective Bowel Elimination  Outcome: Ongoing, Progressing     Problem: Fluid and Electrolyte Imbalance (Hip Arthroplasty)  Goal: Fluid and Electrolyte Balance  Outcome: Ongoing, Progressing     Problem: Functional Ability Impaired (Hip Arthroplasty)  Goal: Optimal Functional Ability  Outcome: Ongoing, Progressing  Intervention: Promote Optimal Functional Status  Recent Flowsheet Documentation  Taken 7/26/2022 0300 by Lisbet Madrigal RN  Activity Management: bedrest     Problem: Infection (Hip Arthroplasty)  Goal: Absence of Infection Signs and Symptoms  Outcome: Ongoing, Progressing     Problem: Neurovascular Compromise (Hip Arthroplasty)  Goal: Intact Neurovascular Status  Outcome: Ongoing, Progressing      Problem: Ongoing Anesthesia Effects (Hip Arthroplasty)  Goal: Anesthesia/Sedation Recovery  Outcome: Ongoing, Progressing  Intervention: Optimize Anesthesia Recovery  Recent Flowsheet Documentation  Taken 7/26/2022 0300 by Lisbet Madrigal RN  Safety Promotion/Fall Prevention: bed alarm on     Problem: Pain (Hip Arthroplasty)  Goal: Acceptable Pain Control  Outcome: Ongoing, Progressing     Problem: Postoperative Urinary Retention (Hip Arthroplasty)  Goal: Effective Urinary Elimination  Outcome: Ongoing, Progressing     Problem: Respiratory Compromise (Hip Arthroplasty)  Goal: Effective Oxygenation and Ventilation  Outcome: Ongoing, Progressing  Intervention: Optimize Oxygenation and Ventilation  Recent Flowsheet Documentation  Taken 7/26/2022 0300 by Lisbet Madrigal, RN  Head of Bed (HOB) Positioning: HOB at 20-30 degrees     Problem: Palliative Care  Goal: Enhanced Quality of Life  Outcome: Ongoing, Progressing   Goal Outcome Evaluation:

## 2022-07-27 NOTE — PLAN OF CARE
Problem: End-of-Life Care  Goal: Comfort, Peace and Preserved Dignity  Outcome: Ongoing, Progressing   Goal Outcome Evaluation:     Evening shift RN had placed call to [facility] Arianna RN as patient had been requiring many prn medications for agitation, restlessness and pain.       0000:  Telephone call back received from ISAEL Martínez.  New orders received.    Patient received prn morphine 5 mg at 2345 for pain.  Received a 1 x order for seroquel 50 mg at 0020 with effective response.  Received prn morphine 5 mg at 0445 for increased respiratory effort.  Patient appears more comfortable at this time.  Tolerated repositioning.  Patient kept eyes closed.  Did not offer conversation.  Withdrawn.  Incontinent void x 1.  Hourly and prn rounds completed.

## 2022-07-27 NOTE — DISCHARGE SUMMARY
"[facility]  Hospitalist Discharge Summary      Date of Admission:  7/15/2022  Date of Discharge:  7/26/2022 10:40 AM  Discharging Provider: ABEBE GÓMEZ MD      Discharge Diagnoses   Active Problems:    Hip fracture, left, closed, initial encounter (H)       Discharge Procedure Orders   General info for SNF   Order Comments: Length of Stay Estimate: Short Term Care: Estimated # of Days <30 Condition at Discharge: Improving Level of care:skilled  Rehabilitation Potential: Good Admission H&P remains valid and up-to-date: Yes Recent Chemotherapy: N/A Use Nursing Home Standing Orders: N/A     Mantoux Instructions   Order Comments: Give two-step Mantoux (PPD) Per Facility Policy {.:[id number]     Incentive Spirometry   Order Comments: Incentive Spirometry 10 times per hour, 4 times per day.     Reason for your hospital stay   Order Comments: Left hip replacement     When to call - Contact Surgeon Team   Order Comments: You may experience symptoms that require follow-up before your scheduled appointment. Refer to the \"Stoplight Tool\" for instructions on when to contact your Surgeon Team if you are concerned about pain control, blood clots, constipation, or if you are unable to urinate.     When to call - Reach out to Urgent Care   Order Comments: If you are not able to reach your Surgeon Team and you need immediate care, go to the Orthopedic Walk-in Clinic or Urgent Care at your Surgeon's office.  Do NOT go to the Emergency Room unless you have shortness of breath, chest pain, or other signs of a medical emergency.     When to call - Reasons to Call 911   Order Comments: Call 911 immediately if you experience sudden-onset chest pain, arm weakness/numbness, slurred speech, or shortness of breath     Symptoms - Fever Management   Order Comments: A low grade fever can be expected after surgery.  Use acetaminophen (TYLENOL) as needed for fever management.  Contact your Surgeon Team if you have a fever " greater than 101.5 F, chills, and/or night sweats.     Symptoms - Constipation management   Order Comments: Constipation (hard, dry bowel movements) is expected after surgery due to the combination of being less active, the anesthetic, and the opioid pain medication.  You can do the following to help reduce constipation:  ~  FLUIDS:  Drink clear liquids (water or Gatorade), or juice (apple/prune).  ~  DIET:  Eat a fiber rich diet.    ~  ACTIVITY:  Get up and move around several times a day.  Increase your activity as you are able.  MEDICATIONS:  Reduce the risk of constipation by starting medications before you are constipated.  You can take Miralax   (1 packet as directed) and/or a stool softener (Senokot 1-2 tablets 1-2 times a day).  If you already have constipation and these medications are not working, you can get magnesium citrate and use as directed.  If you continue to have constipation you can try an over the counter suppository or enema.  Call your Surgeon Team if it has been greater than 3 days since your last bowel movement.     Symptoms - Reduced Urine Output   Order Comments: Changes in the amount of fluids you drank before and after surgery may result in problems urinating.  It is important to stay well-hydrated after surgery and drink plenty of water. If it has been greater than 8 hours since you have urinated despite drinking plenty of water, call your Surgeon Team.     Activity - Exercises to prevent blood clots   Order Comments: Unless otherwise directed by your Surgeon team, perform the following exercises at least three times per day for the first four weeks after surgery to prevent blood clots in your legs: 1) Point and flex your feet (Ankle Pumps), 2) Move your ankle around in big circles, 3) Wiggle your toes, 4) Walk, even for short distances, several times a day, will help decrease the risk of blood clots.     Order Specific Question Answer Comments   Is discharge order? Yes      Comfort and  Pain Management - Pain after Surgery   Order Comments: Pain after surgery is normal and expected.  You will have some amount of pain for several weeks after surgery.  Your pain will improve with time.  There are several things you can do to help reduce your pain including: rest, ice, elevation, and using pain medications as needed. Contact your Surgeon Team if you have pain that persists or worsens after surgery despite rest, ice, elevation, and taking your medication(s) as prescribed. Contact your Surgeon Team if you have new numbness, tingling, or weakness in your operative extremity.     Comfort and Pain Management - Swelling after Surgery   Order Comments: Swelling and/or bruising of the surgical extremity is common and may persist for several months after surgery. In addition to frequent icing and elevation, gentle compressive support with an ACE wrap or tubigrip may help with swelling. Apply compression regularly, removing at least twice daily to perform skin checks. Contact your Surgeon Team if your swelling increases and is NOT associated with an increase in your activity level, or if your swelling increases and is associated with redness and pain.     Comfort and Pain Management - Cold therapy   Order Comments: Ice can be used to control swelling and discomfort after surgery. Place a thin towel over your operative site and apply the ice pack overtop. Leave ice pack in place for 20 minutes, then remove for 20 minutes. Repeat this 20 minutes on/20 minutes off routine as often as tolerated.     Medication Instructions - Acetaminophen (TYLENOL) Instructions   Order Comments: You were discharged with acetaminophen (TYLENOL) for pain management after surgery. Acetaminophen most effectively manages pain symptoms when it is taken on a schedule without missing doses (every four, six, or eight hours). Your Provider will prescribe a safe daily dose between 3000 - 4000 mg.  Do NOT exceed this daily dose. Most patients  use acetaminophen for pain control for the first four weeks after surgery.  You can wean from this medication as your pain decreases.     Medication Instructions - NSAID Instructions   Order Comments: You were discharged with an anti-inflammatory medication for pain management to use in combination with acetaminophen (TYLENOL) and the narcotic pain medication.  Take this medication exactly as directed.  You should only take one anti-inflammatory at a time.  Some common anti-inflammatories include: ibuprofen (ADVIL, MOTRIN), naproxen (ALEVE, NAPROSYN), celecoxib (CELEBREX), meloxicam (MOBIC), ketorolac (TORADOL).  Take this medication with food and water.     Medication Instructions - Opioids - Tapering Instructions   Order Comments: In the first three days following surgery, your symptoms may warrant use of the narcotic pain medication every four to six hours as prescribed. This is normal. As your pain symptoms improve, focus your efforts on decreasing (tapering) use of narcotic medications. The most successful tapering strategy is to first, decrease the number of tablets you take every 4-6 hours to the minimum prescribed. Then, increase the amount of time between doses.  For example:  First, taper to   or 1 tablet every 4-6 hours.  Then, taper to   or 1 tablet every 6-8 hours.  Then, taper to   or 1 tablet every 8-10 hours.  Then, taper to   or 1 tablet every 10-12 hours.  Then, taper to   or 1 tablet at bedtime.  The bedtime dose can help with comfort during sleep and is typically the last dose to be discontinued after surgery.     Follow Up Care   Order Comments: Please follow-up with Dr. Matos/German Saucedo PA-C in 10-14 days at [facility]. Call our scheduling line at [phone] to make an appointment if you do not already have one scheduled.     Shower with wound/dressing covered   Order Comments: You must COVER your dressing or incision with saran wrap (or any other non-permeable covering) to allow the  "incision to remain dry while showering.  You may shower 3 days after surgery as long as the surgical wound stays dry. Continue to cover your dressing or incision for showering until your first office visit.  You are strictly prohibited from soaking   or submerging the surgical wound underwater.     Medication instructions -  Anticoagulation - aspirin   Order Comments: Take the aspirin as prescribed for a total of four weeks after surgery.  This is given to help minimize your risk of blood clot.     Comfort and Pain Management - LOWER Extremity Elevation   Order Comments: Swelling is expected for several months after surgery. This type of swelling is usually associated with gravity and activity, and can be improved with elevation.   The best way to do this is to get your \"toes above your nose\" by laying down and placing several pillows lengthwise under your calf and heel. When elevating your leg keep your knee completely straight. Perform this elevation as often as possible especially for the first two weeks after surgery.     Medication Instructions - Opioid Instructions (Less than 65 years)   Order Comments: You were discharged with an opioid medication (hydromorphone, oxycodone, hydrocodone, or tramadol). This medication should only be taken for breakthrough pain that is not controlled with acetaminophen (TYLENOL). If you rate your pain less than 3 you do not need this medication.  Pain rating 0-3:  You do not need this medication.  Pain rating 4-6:  Take 1 tablet every 4-6 hours as needed  Pain rating 7-10:  Take 2 tablets every 4-6 hours as needed.  Do not exceed 6 tablets per day     Activity - Total Hip Arthroplasty   Order Comments: Refer to the [facility] \"Your Guide to Total Joint Replacement\" for recommendations on activities and Exercises.     Order Specific Question Answer Comments   Is discharge order? Yes      No adduction past midline   Order Comments: No crossing your operative leg.     Order " Specific Question Answer Comments   Is discharge order? Yes      No flexion past 90 degrees   Order Comments: No bending at waist past 90 degrees.     Order Specific Question Answer Comments   Is discharge order? Yes      No internal rotation   Order Comments: No pivoting on your operative leg.     Order Specific Question Answer Comments   Is discharge order? Yes      No external rotation   Order Comments: No pivoting on your operative leg.     Order Specific Question Answer Comments   Is discharge order? Yes      General info for SNF   Order Comments: Level of care:hospice  Rehabilitation Potential: Excellent  Admission H&P remains valid and up-to-date: Yes  Recent Chemotherapy: N/A  Use Nursing Home Standing Orders: Yes     Mantoux instructions   Order Comments: Give two-step Mantoux (PPD) Per Facility Policy Yes     Reason for your hospital stay   Order Comments: Hip fracture     Follow Up and recommended labs and tests   Order Comments: Follow up with Nursing home physician.  No follow up labs or test are needed.     Additional Discharge Instructions   Order Comments: Hospice to follow     Weight bearing status     Wound care (specify)   Order Comments: Site:   l hip  Instructions:  per ortho orders     Activity - Up with nursing assistance     Order Specific Question Answer Comments   Is discharge order? Yes      Physical Therapy Adult Consult   Order Comments: Evaluate and treat as clinically indicated.    Reason: Status Post Hip Surgery     Occupational Therapy Adult Consult   Order Comments: Evaluate and treat as clinically indicated.    Reason: Status Post Hip Surgery     Fall precautions     Fall precautions     Crutches DME   Order Comments: DME Documentation: Describe the reason for need to support medical necessity: Impaired gait status post hip surgery. I, the undersigned, certify that the above prescribed supplies are medically necessary for this patient and is both reasonable and necessary in  reference to accepted standards of medical practice in the treatment of this patient's condition and is not prescribed as a convenience.     Order Specific Question Answer Comments   DME Provider: [facility]    Crutch Type: Standard    Crutches Add On: NA    Length of Need: Lifetime      Cane DME   Order Comments: DME Documentation: Describe the reason for need to support medical necessity: Impaired gait status post hip surgery. I, the undersigned, certify that the above prescribed supplies are medically necessary for this patient and is both reasonable and necessary in reference to accepted standards of medical practice in the treatment of this patient's condition and is not prescribed as a convenience.     Order Specific Question Answer Comments   DME Provider: [employer]    Cane Type: Single Tip    Reminder: Patient can typically get 1 every 5 years      Walker DME   Order Comments: : DME Documentation: Describe the reason for need to support medical necessity: Impaired gait status post hip surgery. I, the undersigned, certify that the above prescribed supplies are medically necessary for this patient and is both reasonable and necessary in reference to accepted standards of medical practice in the treatment of this patient's condition and is not prescribed as a convenience.     Order Specific Question Answer Comments   DME Provider: [employer]    Walker Type: Standard (2 Wheel)    Accessories: N/A      Diet   Order Comments: Follow this diet upon discharge: Orders Placed This Encounter      Advance Diet as Tolerated: Regular Diet Adult     Order Specific Question Answer Comments   Is discharge order? Yes      Diet   Order Comments: Follow this diet upon discharge: Orders Placed This Encounter      Diet      Regular Diet Adult       Order Specific Question Answer Comments   Is discharge order? Yes           Hospital Course   88-year-old male with past medical history of Parkinson's disease, dementia  was brought to ED for evaluation of left hip pain after mechanical fall, found to have left hip fracture, underwent surgery and admitted for further management.  Patient's hospital course has been complicated by encephalopathy and acute on likely chronic dysphagia.  He is now on comfort care     1.Mechanical fall and sustained displaced left femur neck fracture;  - On 7/16, s/p left unipolar hip hemiarthroplasty by Dr. Matos  -Postoperative care, DVT prophylaxis per orthopedic team asa bid  -Added scheduled Tylenol, try to avoid narcotics given his age and delirium  Now on comfort care, discontinue PT OT.  Pain controlled with Tylenol, not requiring narcotics.      2.Acute blood loss, postoperative;  - Operative note reported  mL  - Hemoglobin fairly stable.  Hemoglobin 11.5 on 7/21  No further lab draws     3.Acute metabolic encephalopathy with underlying dementia;  -Likely due to narcotic medication, different surroundings, underlying dementia/Parkinson's  -  delirium protocol  -was on 1:1 til late on 7/19  -checked head ct-neg  -psych consult-apprecaited  Symptoms controlled with Seroquel or lorazepam  Scheduling Seroquel at night     4.acute on likely chronic dysphagia   aspiration risks--very high  -did poorly on follow evaluation  -after long discussion on 7/21 family decided NOT to do feeding tube.  Suspect chronic.  Now on comfort care, diet as tolerated.     6.History of colon s/p post surgery and colostomy bag;  - Patient's daughter reported on remission and taking daily MiraLAX, ordered.  - Continue colostomy bag care--has had small output.     7.History of parkinsonism; with dementia--retired ([employer])  -Continue Sinemet  -MRI done, no cva     8.Leukocytosis on admission; likely stress.  Resolved  - On admission, no reported recent febrile illness, respiratory/GI/ symptoms.     9.Hypokalemia  -replaced     10.Moderate malnutrition  -now failed swallow--no FT  Now on  "comfort care     11.DVT prophylaxis; per orthopedic team, they started patient on twice daily aspirin.  Discontinued, now on comfort care     Goals of care: DNR/DNI, family now wishes for comfort care and discharged with hospice          Consultations This Hospital Stay   ORTHOPEDIC SURGERY IP CONSULT  PHARMACY IP CONSULT  PHYSICAL THERAPY ADULT IP CONSULT  OCCUPATIONAL THERAPY ADULT IP CONSULT  PHARMACY IP CONSULT  PHYSICAL THERAPY ADULT IP CONSULT  OCCUPATIONAL THERAPY ADULT IP CONSULT  SPEECH LANGUAGE PATH ADULT IP CONSULT  PSYCHIATRY IP CONSULT  SPEECH LANGUAGE PATH ADULT IP CONSULT  PALLIATIVE CARE ADULT IP CONSULT  SPIRITUAL HEALTH SERVICES IP CONSULT  SOCIAL WORK IP CONSULT  INPATIENT HOSPICE ADULT CONSULT    Code Status   No CPR- Do NOT Intubate         Greater than 35 minutes spent on coordinating discharge, evaluating labs, studies, evaluating patient, evaluating specialist notes    ABEBE GÓMEZ MD  [address] [address]  Phone: [phone]  Fax: [phone]  ______________________________________________________________________         Primary Care Physician   Physician No Ref-Primary    Discharge Orders      General info for SNF    Length of Stay Estimate: Short Term Care: Estimated # of Days <30 Condition at Discharge: Improving Level of care:skilled  Rehabilitation Potential: Good Admission H&P remains valid and up-to-date: Yes Recent Chemotherapy: N/A Use Nursing Home Standing Orders: N/A     Mantoux Instructions    Give two-step Mantoux (PPD) Per Facility Policy {.:[id number]     Incentive Spirometry    Incentive Spirometry 10 times per hour, 4 times per day.     Reason for your hospital stay    Left hip replacement     When to call - Contact Surgeon Team    You may experience symptoms that require follow-up before your scheduled appointment. Refer to the \"Stoplight Tool\" for instructions on when to contact your Surgeon Team if you are " concerned about pain control, blood clots, constipation, or if you are unable to urinate.     When to call - Reach out to Urgent Care    If you are not able to reach your Surgeon Team and you need immediate care, go to the Orthopedic Walk-in Clinic or Urgent Care at your Surgeon's office.  Do NOT go to the Emergency Room unless you have shortness of breath, chest pain, or other signs of a medical emergency.     When to call - Reasons to Call 911    Call 911 immediately if you experience sudden-onset chest pain, arm weakness/numbness, slurred speech, or shortness of breath     Symptoms - Fever Management    A low grade fever can be expected after surgery.  Use acetaminophen (TYLENOL) as needed for fever management.  Contact your Surgeon Team if you have a fever greater than 101.5 F, chills, and/or night sweats.     Symptoms - Constipation management    Constipation (hard, dry bowel movements) is expected after surgery due to the combination of being less active, the anesthetic, and the opioid pain medication.  You can do the following to help reduce constipation:  ~  FLUIDS:  Drink clear liquids (water or Gatorade), or juice (apple/prune).  ~  DIET:  Eat a fiber rich diet.    ~  ACTIVITY:  Get up and move around several times a day.  Increase your activity as you are able.  MEDICATIONS:  Reduce the risk of constipation by starting medications before you are constipated.  You can take Miralax   (1 packet as directed) and/or a stool softener (Senokot 1-2 tablets 1-2 times a day).  If you already have constipation and these medications are not working, you can get magnesium citrate and use as directed.  If you continue to have constipation you can try an over the counter suppository or enema.  Call your Surgeon Team if it has been greater than 3 days since your last bowel movement.     Symptoms - Reduced Urine Output    Changes in the amount of fluids you drank before and after surgery may result in problems urinating.   It is important to stay well-hydrated after surgery and drink plenty of water. If it has been greater than 8 hours since you have urinated despite drinking plenty of water, call your Surgeon Team.     Activity - Exercises to prevent blood clots    Unless otherwise directed by your Surgeon team, perform the following exercises at least three times per day for the first four weeks after surgery to prevent blood clots in your legs: 1) Point and flex your feet (Ankle Pumps), 2) Move your ankle around in big circles, 3) Wiggle your toes, 4) Walk, even for short distances, several times a day, will help decrease the risk of blood clots.     Comfort and Pain Management - Pain after Surgery    Pain after surgery is normal and expected.  You will have some amount of pain for several weeks after surgery.  Your pain will improve with time.  There are several things you can do to help reduce your pain including: rest, ice, elevation, and using pain medications as needed. Contact your Surgeon Team if you have pain that persists or worsens after surgery despite rest, ice, elevation, and taking your medication(s) as prescribed. Contact your Surgeon Team if you have new numbness, tingling, or weakness in your operative extremity.     Comfort and Pain Management - Swelling after Surgery    Swelling and/or bruising of the surgical extremity is common and may persist for several months after surgery. In addition to frequent icing and elevation, gentle compressive support with an ACE wrap or tubigrip may help with swelling. Apply compression regularly, removing at least twice daily to perform skin checks. Contact your Surgeon Team if your swelling increases and is NOT associated with an increase in your activity level, or if your swelling increases and is associated with redness and pain.     Comfort and Pain Management - Cold therapy    Ice can be used to control swelling and discomfort after surgery. Place a thin towel over your  operative site and apply the ice pack overtop. Leave ice pack in place for 20 minutes, then remove for 20 minutes. Repeat this 20 minutes on/20 minutes off routine as often as tolerated.     Medication Instructions - Acetaminophen (TYLENOL) Instructions    You were discharged with acetaminophen (TYLENOL) for pain management after surgery. Acetaminophen most effectively manages pain symptoms when it is taken on a schedule without missing doses (every four, six, or eight hours). Your Provider will prescribe a safe daily dose between 3000 - 4000 mg.  Do NOT exceed this daily dose. Most patients use acetaminophen for pain control for the first four weeks after surgery.  You can wean from this medication as your pain decreases.     Medication Instructions - NSAID Instructions    You were discharged with an anti-inflammatory medication for pain management to use in combination with acetaminophen (TYLENOL) and the narcotic pain medication.  Take this medication exactly as directed.  You should only take one anti-inflammatory at a time.  Some common anti-inflammatories include: ibuprofen (ADVIL, MOTRIN), naproxen (ALEVE, NAPROSYN), celecoxib (CELEBREX), meloxicam (MOBIC), ketorolac (TORADOL).  Take this medication with food and water.     Medication Instructions - Opioids - Tapering Instructions    In the first three days following surgery, your symptoms may warrant use of the narcotic pain medication every four to six hours as prescribed. This is normal. As your pain symptoms improve, focus your efforts on decreasing (tapering) use of narcotic medications. The most successful tapering strategy is to first, decrease the number of tablets you take every 4-6 hours to the minimum prescribed. Then, increase the amount of time between doses.  For example:  First, taper to   or 1 tablet every 4-6 hours.  Then, taper to   or 1 tablet every 6-8 hours.  Then, taper to   or 1 tablet every 8-10 hours.  Then, taper to   or 1 tablet  "every 10-12 hours.  Then, taper to   or 1 tablet at bedtime.  The bedtime dose can help with comfort during sleep and is typically the last dose to be discontinued after surgery.     Follow Up Care    Please follow-up with Dr. Matos/German Saucedo PA-C in 10-14 days at [facility]. Call our scheduling line at [phone] to make an appointment if you do not already have one scheduled.     Shower with wound/dressing covered    You must COVER your dressing or incision with saran wrap (or any other non-permeable covering) to allow the incision to remain dry while showering.  You may shower 3 days after surgery as long as the surgical wound stays dry. Continue to cover your dressing or incision for showering until your first office visit.  You are strictly prohibited from soaking   or submerging the surgical wound underwater.     Medication instructions -  Anticoagulation - aspirin    Take the aspirin as prescribed for a total of four weeks after surgery.  This is given to help minimize your risk of blood clot.     Comfort and Pain Management - LOWER Extremity Elevation    Swelling is expected for several months after surgery. This type of swelling is usually associated with gravity and activity, and can be improved with elevation.   The best way to do this is to get your \"toes above your nose\" by laying down and placing several pillows lengthwise under your calf and heel. When elevating your leg keep your knee completely straight. Perform this elevation as often as possible especially for the first two weeks after surgery.     Medication Instructions - Opioid Instructions (Less than 65 years)    You were discharged with an opioid medication (hydromorphone, oxycodone, hydrocodone, or tramadol). This medication should only be taken for breakthrough pain that is not controlled with acetaminophen (TYLENOL). If you rate your pain less than 3 you do not need this medication.  Pain rating 0-3:  You do not need this " "medication.  Pain rating 4-6:  Take 1 tablet every 4-6 hours as needed  Pain rating 7-10:  Take 2 tablets every 4-6 hours as needed.  Do not exceed 6 tablets per day     Activity - Total Hip Arthroplasty    Refer to the [facility] \"Your Guide to Total Joint Replacement\" for recommendations on activities and Exercises.     No adduction past midline    No crossing your operative leg.     No flexion past 90 degrees    No bending at waist past 90 degrees.     No internal rotation    No pivoting on your operative leg.     No external rotation    No pivoting on your operative leg.     General info for SNF    Level of care:hospice  Rehabilitation Potential: Excellent  Admission H&P remains valid and up-to-date: Yes  Recent Chemotherapy: N/A  Use Nursing Home Standing Orders: Yes     Mantoux instructions    Give two-step Mantoux (PPD) Per Facility Policy Yes     Reason for your hospital stay    Hip fracture     Follow Up and recommended labs and tests    Follow up with Nursing home physician.  No follow up labs or test are needed.     Additional Discharge Instructions    Hospice to follow     Weight bearing status     Wound care (specify)    Site:   l hip  Instructions:  per ortho orders     Activity - Up with nursing assistance     Physical Therapy Adult Consult    Evaluate and treat as clinically indicated.    Reason: Status Post Hip Surgery     Occupational Therapy Adult Consult    Evaluate and treat as clinically indicated.    Reason: Status Post Hip Surgery     Fall precautions     Fall precautions     Crutches DME    DME Documentation: Describe the reason for need to support medical necessity: Impaired gait status post hip surgery. I, the undersigned, certify that the above prescribed supplies are medically necessary for this patient and is both reasonable and necessary in reference to accepted standards of medical practice in the treatment of this patient's condition and is not prescribed as a convenience. "     [facility]    DME Documentation: Describe the reason for need to support medical necessity: Impaired gait status post hip surgery. I, the undersigned, certify that the above prescribed supplies are medically necessary for this patient and is both reasonable and necessary in reference to accepted standards of medical practice in the treatment of this patient's condition and is not prescribed as a convenience.     [facility]    : DME Documentation: Describe the reason for need to support medical necessity: Impaired gait status post hip surgery. I, the undersigned, certify that the above prescribed supplies are medically necessary for this patient and is both reasonable and necessary in reference to accepted standards of medical practice in the treatment of this patient's condition and is not prescribed as a convenience.     Diet    Follow this diet upon discharge: Orders Placed This Encounter      Advance Diet as Tolerated: Regular Diet Adult     Diet    Follow this diet upon discharge: Orders Placed This Encounter      Diet      Regular Diet Adult         Significant Results and Procedures   Most Recent 3 CBC's:Recent Labs   Lab Test 07/22/22  0709 07/21/22  0631 07/20/22  0716 07/17/22  1506 07/16/22  1040 07/15/22  2132   WBC  --  7.7  --   --  8.4 14.8*   HGB 11.1* 11.5* 10.5*   < > 13.3 14.5   MCV  --  93  --   --  93 94   PLT  --  171  --   --  154 193    < > = values in this interval not displayed.     Most Recent 3 BMP's:Recent Labs   Lab Test 07/22/22  0709 07/21/22  1715 07/21/22  1543 07/21/22  0631 07/20/22  0716     --   --  143 142   POTASSIUM 3.6 4.0 4.1 3.3* 3.5   CHLORIDE 109*  --   --  107 108*   CO2 27  --   --  30 28   BUN 15  --   --  17 17   CR 0.71  --   --  0.76 0.77   ANIONGAP 5  --   --  6 6   SELINA 8.3*  --   --  8.5 8.4*     --   --  107 97     Most Recent 2 LFT's:Recent Labs   Lab Test 07/15/22  2132   AST 28   ALT <9   ALKPHOS 96   BILITOTAL 1.5*   ,   Results for orders  placed or performed during the hospital encounter of 07/15/22   XR Femur Left 2 Views    Narrative    EXAM: XR PELVIS 1/2 VW, XR FEMUR LEFT 2 VIEW  LOCATION: [facility]  DATE/TIME: 7/16/2022 8:27 AM    INDICATION: Left hip pain.  COMPARISON: None.      Impression    IMPRESSION:     Acute mildly impacted nondisplaced subcapital fracture of the left femoral neck.    No acute fracture in the pelvis or hips otherwise. Generalized demineralization. No concerning bone lesion.    Normal alignment of the hip joints with minimal degenerative arthritic changes. Intact pelvic ring. Small radiodense foreign body projects over the anterior aspect of the left pelvis/hip.    Moderate advanced degenerative changes in the lower lumbar spine.    The remainder of the left femur is normal. No other fracture or bone lesion. Normal knee joint alignment. No significant joint effusion. Minimal degenerative changes in the lateral compartment.   XR Pelvis 1/2 Views    Narrative    EXAM: XR PELVIS 1/2 VW, XR FEMUR LEFT 2 VIEW  LOCATION: [facility]  DATE/TIME: 7/16/2022 8:27 AM    INDICATION: Left hip pain.  COMPARISON: None.      Impression    IMPRESSION:     Acute mildly impacted nondisplaced subcapital fracture of the left femoral neck.    No acute fracture in the pelvis or hips otherwise. Generalized demineralization. No concerning bone lesion.    Normal alignment of the hip joints with minimal degenerative arthritic changes. Intact pelvic ring. Small radiodense foreign body projects over the anterior aspect of the left pelvis/hip.    Moderate advanced degenerative changes in the lower lumbar spine.    The remainder of the left femur is normal. No other fracture or bone lesion. Normal knee joint alignment. No significant joint effusion. Minimal degenerative changes in the lateral compartment.   XR Pelvis w Hip Port Left  1 View    Narrative    EXAM: XR PELVIS AND HIP PORTABLE LEFT 1  VIEW  LOCATION: [facility]  DATE/TIME: 7/16/2022 1:56 PM    INDICATION: Status post Hip surgery  COMPARISON: 07/16/2022.      Impression    IMPRESSION: Left femoral fracture has been treated with a hemiarthroplasty and hip implant. Bones are demineralized. No dislocation.   CT Head w/o Contrast    Narrative    EXAM: CT HEAD W/O CONTRAST  LOCATION: [facility]  DATE/TIME: 7/19/2022 9:13 AM    INDICATION: 88 y o with Parkinson, delirium after hip surgery. Evaluate for stroke.   COMPARISON: None.  TECHNIQUE: Routine CT Head without IV contrast. Multiplanar reformats. Dose reduction techniques were used.    FINDINGS: Image quality mildly degraded by motion.    INTRACRANIAL CONTENTS: No intracranial hemorrhage, extraaxial collection, or mass effect.  No CT evidence of acute infarct. Mild presumed chronic small vessel ischemic changes. Mild to moderate generalized volume loss. No hydrocephalus. Cavum septum   pellucidum noted.    VISUALIZED ORBITS/SINUSES/MASTOIDS: No intraorbital abnormality. No paranasal sinus mucosal disease. No middle ear or mastoid effusion.    BONES/SOFT TISSUES: No acute abnormality.      Impression    IMPRESSION:  1.  Within constraints of motion, no acute intracranial abnormality.  2.  Brain atrophy and presumed chronic microvascular ischemic changes as above.               XR Video Swallow with SLP or OT    Narrative    EXAM: XR VIDEO SWALLOW WITH SLP OR OT  LOCATION: [facility]  DATE/TIME: 7/21/2022 11:00 AM    INDICATION: Difficulty swallowing. Failed bedside speech evaluation. History of Parkinson's disease.  COMPARISON: None.    TECHNIQUE: Routine swallow study with speech pathology using multiple barium thicknesses.    FINDINGS:   FLUOROSCOPIC TIME: 1.2 minutes  NUMBER OF IMAGES: 5 digital fluoroscopy cine clips.    Swallow study with Speech Pathology using multiple barium thicknesses.     Poor oral control the bolus  with some loss over the base the tongue and piecemeal bolusing. Abnormal swallow reflex with posterior/inferior translocation the epiglottis. This results in aspiration of thin liquid consistency during consecutive swallows of   the straw, with no cough reflex at the cords and delayed tracheal cough. Prominent vallecular sinus stasis with all consistencies. Patient had additional aspiration episodes of aspiration of stasis.    Please see speech pathology report for further details the exam and recommendations.   MR Brain w/o Contrast    Narrative    EXAM: MR BRAIN W/O CONTRAST  LOCATION: [facility]  DATE/TIME: 7/20/2022 5:31 PM    INDICATION: 88 y o man with parkinson, demenita, s p hip fx, post op some slurring words, delirium, make sure no cva, ct neg  COMPARISON: CT head 07/19/2022  TECHNIQUE: Head MRI without IV contrast including diffusion weighted imaging, FLAIR and hemosiderin sensitive sequences.    FINDINGS:  INTRACRANIAL CONTENTS: No acute or subacute infarct. No mass, acute hemorrhage, or extra-axial fluid collections. Scattered nonspecific T2/FLAIR hyperintensities within the cerebral white matter most consistent with mild chronic microvascular ischemic   change. Moderate generalized cerebral atrophy. No hydrocephalus. Normal position of the cerebellar tonsils.     OTHER: Accounting for technique no additional abnormalities identified.      Impression    IMPRESSION:  1.  No acute intracranial process.  2.  Generalized brain atrophy and presumed microvascular ischemic changes as detailed above.       Discharge Medications   Discharge Medication List as of 7/26/2022 10:17 AM      START taking these medications    Details   acetaminophen (TYLENOL) 325 MG suppository Place 1 suppository (325 mg) rectally every 4 hours as needed for fever, Disp-6 suppository, R-0, E-Prescribe      lidocaine (XYLOCAINE) 5 % external ointment Apply topically every 4 hours as needed for moderate pain  (Apply to painful areas on neck and back as needed)Disp-50 g, [id number]      LORazepam (ATIVAN) 2 MG/ML (HIGH CONC) oral solution Take 0.25 mLs (0.5 mg) by mouth every 4 hours as needed for agitation, anxiety or nausea, Disp-30 mL, R-0, Local Print      !! QUEtiapine (SEROQUEL) 25 MG tablet Take 0.5 tablets (12.5 mg) by mouth every 6 hours as needed (Agitation), Disp-20 tablet, R-0, E-Prescribe      !! QUEtiapine (SEROQUEL) 25 MG tablet Take 0.5 tablets (12.5 mg) by mouth At Bedtime, Disp-15 tablet, R-0, E-Prescribe      senna-docusate (SENOKOT-S/PERICOLACE) 8.6-50 MG tablet Take 1 tablet by mouth 2 times daily as needed for constipation, Disp-60 tablet, R-0, Local Print       !! - Potential duplicate medications found. Please discuss with provider.      CONTINUE these medications which have CHANGED    Details   acetaminophen (TYLENOL) 325 MG tablet Take 2 tablets (650 mg) by mouth every 4 hours as needed for mild pain or fever, Disp-100 tablet, R-0, E-Prescribe      carbidopa-levodopa (SINEMET)  MG tablet Take 1.5 tablets by mouth 3 times daily, Disp-30 tablet, R-0, E-Prescribe      famotidine (PEPCID) 20 MG tablet Take 1 tablet (20 mg) by mouth 2 times daily for 20 days, Disp-40 tablet, R-0, E-Prescribe      morphine sulfate (ROXANOL) 20 mg/mL (HIGH CONC) soln Place 0.25 mLs (5 mg) under the tongue every hour as needed for shortness of breath / dyspnea, breakthrough pain or pain, Disp-30 mL, R-0, Local Print         CONTINUE these medications which have NOT CHANGED    Details   latanoprost (XALATAN) 0.005 % ophthalmic solution Place 1 drop into both eyes At Bedtime, Historical         STOP taking these medications       aspirin (ASA) 325 MG tablet Comments:   Reason for Stopping:             Allergies   No Known Allergies    Physical Exam:  Temp:  [97.6  F (36.4  C)-98  F (36.7  C)] 98  F (36.7  C)  Pulse:  [74] 74  Resp:  [18-22] 22  BP: ()/(68-70) 98/70  SpO2:  [94 %-95 %] 95 %    /68 (BP  "Location: Right arm, Patient Position: Semi-Victoria's)   Pulse 74   Temp 97.6  F (36.4  C) (Axillary)   Resp 18   Ht 1.727 m (5' 8\")   Wt 62.1 kg (137 lb)   SpO2 94%   BMI 20.83 kg/m    General appearance: alert, appears stated age, cachectic, cooperative and slowed mentation  Head: Normocephalic, without obvious abnormality, atraumatic  Eyes: Clear conjuctiva  Neck: no JVD and supple, symmetrical, trachea midline  Lungs: clear to auscultation bilaterally  Heart: regular rate and rhythm, S1, S2 normal, no murmur, click, rub or gallop  Abdomen: soft, non-tender; bowel sounds normal; no masses,  no organomegaly  Extremities: Noam's sign is negative, no sign of DVT  Skin: Skin color, texture, turgor normal. No rashes or lesions  Neurologic: Mental status: alertness: alert  Cranial nerves: normal  Sensory: normal  Motor:grossly normal        "

## 2022-07-27 NOTE — PLAN OF CARE
Problem: End-of-Life Care  Goal: Comfort, Peace and Preserved Dignity  Outcome: Ongoing, Not Progressing   Goal Outcome Evaluation:        Polst and Health Care directive added to chart.  Pt lethargic and unable to wake up enough to swallow at arrival . CM manager notified about inability to safely give meds. Pt had bilateral upper air congestion Family updated.  Seroquel 25mg and Morphine 5 mg given prior to bed bath and colostomy bag changes at 0800. Turn and reposition q2-3h helpful with congestion.  Oral cares frequently, dry mouth and gums.

## 2022-07-27 NOTE — PLAN OF CARE
Problem: End-of-Life Care  Goal: Comfort, Peace and Preserved Dignity  Outcome: Ongoing, Progressing     Problem: Pain Acute  Goal: Acceptable Pain Control and Functional Ability  Outcome: Ongoing, Progressing  Intervention: Prevent or Manage Pain  Recent Flowsheet Documentation  Taken 7/26/2022 1615 by Melissa Rivera RN  Medication Review/Management: medications reviewed     Problem: Fall Injury Risk  Goal: Absence of Fall and Fall-Related Injury  Outcome: Ongoing, Progressing  Intervention: Identify and Manage Contributors  Recent Flowsheet Documentation  Taken 7/26/2022 1615 by Melissa Rivera RN  Medication Review/Management: medications reviewed  Intervention: Promote Injury-Free Environment  Recent Flowsheet Documentation  Taken 7/26/2022 1615 by Melissa Rivera RN  Safety Promotion/Fall Prevention:   bed alarm on   room door open   safety round/check completed     Problem: Skin Injury Risk Increased  Goal: Skin Health and Integrity  Outcome: Ongoing, Progressing  Intervention: Optimize Skin Protection  Recent Flowsheet Documentation  Taken 7/26/2022 2230 by Melissa Rivera RN  Head of Bed (HOB) Positioning: HOB at 20-30 degrees  Taken 7/26/2022 1900 by Melissa Rivera RN  Head of Bed (HOB) Positioning: HOB at 30 degrees  Taken 7/26/2022 1700 by Melissa Rivera RN  Head of Bed (HOB) Positioning: HOB at 20-30 degrees   Goal Outcome Evaluation:      Pt alert to self only. Speech is nonsensical, and rambles. Restless and agitated throughout the shift. Denied pain but did c/o x 2 (grimace/ouch). At 1630 Ativan given. At 1800 Morphine 5 mg given. At 1900 pt still resltess, pulling at gown, blankets ect. PRN Seroquel 12.5 mg given. At 2100 Pt still rambling, fidgeting with gown, blankets, legs over rail setting of bed alarm. Noted frown, appeared uncomfortable. PRN Morphine, Ativan and Seroquel given. At 2200 pt still  restless, one leg over rail. Call to  Hospice. Suggested prn Morphine now and will call back after updating MDNik  Morphine 5 mg given at 2230. Pt is finally sleeping. HOB up at 30 degrees. Pt was incontinent x 2 small amt. Colostomy small amt in bag. Mepilex changed on coccyx (stage 2 pressure area, red). HS meds given crushed in applesauce and pt coughed quite a bit afterward. Sounded congested afterwards.

## 2022-07-28 NOTE — PROGRESS NOTES
All pills held d/t minimal responsive.  Unable to swallow.  Morphine given for RR.  Ativan given for comfort.  Very stiff with repositioning.

## (undated) DEVICE — GLOVE SURGEON PI ORTHO SZ 7 LF

## (undated) DEVICE — PILLOW HIP ABDUCTION CONCAVE 1.9

## (undated) DEVICE — SUTURE VICRYL+ 0 27IN CT-1 UND VCP260H

## (undated) DEVICE — PLUG CEMENT BUCK W/INSERT 25MM 7127-9423: Type: IMPLANTABLE DEVICE | Site: HIP | Status: NON-FUNCTIONAL

## (undated) DEVICE — HOLDER LIMB VELCRO OR 0814-1533

## (undated) DEVICE — SU STRATAFIX PDS PLUS 1 CT-1 18" SXPP1A404

## (undated) DEVICE — DRAPE IOBAN INCISE 23X17" 6650EZ

## (undated) DEVICE — SOL WATER IRRIG 1000ML BOTTLE 2F7114

## (undated) DEVICE — DRSG AQUACEL AG HYDROFIBER  3.5X10" 422605

## (undated) DEVICE — BONE CLEANING TIP INTERPULSE FEMORAL CANAL 0210-008-000

## (undated) DEVICE — SOL NACL 0.9% INJ 1000ML BAG 2B1324X

## (undated) DEVICE — DRAPE STERI U 1015

## (undated) DEVICE — SU MONOCRYL 3-0 PS-2 18" UND MCP497G

## (undated) DEVICE — DEVICE RETRIEVER HEWSON 71111579

## (undated) DEVICE — PAD HIP POSITIONING MCGUIRE 707-CPM

## (undated) DEVICE — SU ETHIBOND 5 V-37 4X30" MB66G

## (undated) DEVICE — BLADE SAW SAGITTAL STRK WIDE 25.4X85X1.2MM 2108-151-000

## (undated) DEVICE — SUTURE VICRYL+ 2-0 27IN CT-1 UND VCP259H

## (undated) DEVICE — A3 SUPPLIES- SEE NURSING INFO PAGE

## (undated) DEVICE — GLOVE BIOGEL INDICATOR 7.5 LF 41675

## (undated) DEVICE — ALCOHOL ISOPROPYL 4 OZ 70% IA7004

## (undated) DEVICE — DRSG KERLIX FLUFFS X5

## (undated) DEVICE — DRAPE CONVERTORS U-DRAPE 60X72" 8476

## (undated) DEVICE — SUCTION IRR SYSTEM W/O TIP INTERPULSE HANDPIECE 0210-100-000

## (undated) DEVICE — CUSTOM PACK TOTAL HIP SOP5BTHHEA

## (undated) DEVICE — RETRIVER SUTURE LASSO HOFFEE BLUE 710000

## (undated) DEVICE — BRUSH FEMORAL CANAL NARROW 110033

## (undated) DEVICE — TAMPON SUCTION FEMORAL CANAL 110037

## (undated) DEVICE — SUCTION MANIFOLD NEPTUNE 2 SYS 4 PORT 0702-020-000

## (undated) DEVICE — CATH TRAY FOLEY SURESTEP 16FR DRAIN BAG STATOCK A899916

## (undated) DEVICE — GOWN XLG DISP 9545

## (undated) DEVICE — GLOVE BIOGEL PI INDICATOR 9.0 LF  41690

## (undated) DEVICE — PLATE GROUNDING ADULT W/CORD 9165L

## (undated) DEVICE — GLOVE SURG PI ULTRA TOUCH M SZ 8-1/2 LF

## (undated) DEVICE — ADH SKIN CLOSURE PREMIERPRO EXOFIN 1.0ML 3470

## (undated) DEVICE — BONE CLEANING TIP INTERPULSE  0210-010-000

## (undated) RX ORDER — PHENYLEPHRINE HYDROCHLORIDE 10 MG/ML
INJECTION INTRAVENOUS
Status: DISPENSED
Start: 2022-01-01

## (undated) RX ORDER — LIDOCAINE HYDROCHLORIDE 10 MG/ML
INJECTION, SOLUTION EPIDURAL; INFILTRATION; INTRACAUDAL; PERINEURAL
Status: DISPENSED
Start: 2022-01-01

## (undated) RX ORDER — FENTANYL CITRATE 50 UG/ML
INJECTION, SOLUTION INTRAMUSCULAR; INTRAVENOUS
Status: DISPENSED
Start: 2022-01-01

## (undated) RX ORDER — KETAMINE HYDROCHLORIDE 10 MG/ML
INJECTION INTRAMUSCULAR; INTRAVENOUS
Status: DISPENSED
Start: 2022-01-01